# Patient Record
Sex: FEMALE | Race: WHITE | Employment: PART TIME | URBAN - METROPOLITAN AREA
[De-identification: names, ages, dates, MRNs, and addresses within clinical notes are randomized per-mention and may not be internally consistent; named-entity substitution may affect disease eponyms.]

---

## 2020-05-21 ENCOUNTER — VIRTUAL VISIT (OUTPATIENT)
Dept: PRIMARY CARE CLINIC | Age: 45
End: 2020-05-21

## 2020-05-21 DIAGNOSIS — R10.33 UMBILICAL PAIN: Primary | ICD-10-CM

## 2020-05-21 DIAGNOSIS — Z98.890 HX OF UMBILICAL HERNIA REPAIR: ICD-10-CM

## 2020-05-21 DIAGNOSIS — Z87.19 HX OF UMBILICAL HERNIA REPAIR: ICD-10-CM

## 2020-05-21 DIAGNOSIS — Z76.89 ENCOUNTER TO ESTABLISH CARE WITH NEW DOCTOR: ICD-10-CM

## 2020-05-21 DIAGNOSIS — K51.90 CHRONIC ULCERATIVE COLITIS WITHOUT COMPLICATION, UNSPECIFIED LOCATION (HCC): ICD-10-CM

## 2020-05-21 DIAGNOSIS — M62.08 SEPARATION OF ABDOMINAL MUSCLES: ICD-10-CM

## 2020-05-21 PROBLEM — Z91.09 ALLERGY TO ENVIRONMENTAL FACTORS: Status: ACTIVE | Noted: 2020-05-21

## 2020-05-21 PROBLEM — J45.990 ASTHMA, EXERCISE INDUCED: Status: ACTIVE | Noted: 2020-05-21

## 2020-05-21 RX ORDER — MESALAMINE 800 MG/1
TABLET, DELAYED RELEASE ORAL
COMMUNITY
Start: 2015-11-30

## 2020-05-21 RX ORDER — MINERAL OIL
ENEMA (ML) RECTAL
COMMUNITY
Start: 2017-12-12

## 2020-05-21 NOTE — PROGRESS NOTES
Prem Fatima is a 38 y.o. female who was seen by synchronous (real-time) audio-video technology on 5/21/2020. Consent: Prem Fatima, who was seen by synchronous (real-time) audio-video technology, and/or her healthcare decision maker, is aware that this patient-initiated, Telehealth encounter on 5/21/2020 is a billable service, with coverage as determined by her insurance carrier. She is aware that she may receive a bill and has provided verbal consent to proceed: Yes. I was in the office while conducting this encounter. This virtual visit was conducted via [address] Pursuant to the emergency declaration under the [facility], [facility] waiver authority and the [employer] and [employer] Act, this Virtual  Visit was conducted to reduce the patient's risk of exposure to COVID-19 and provide continuity of care for an established patient. Services were provided through a video synchronous discussion virtually to substitute for in-person clinic visit. Due to this being a TeleHealth evaluation, many elements of the physical examination are unable to be assessed. Assessment & Plan:     Diagnoses and all orders for this visit:    1. Umbilical pain  -    Tenderness above the umbilicus. Concern about hernia. Will send her for US ABD COMP; Future        And mange accordingly. 2. Hx of umbilical hernia repair  -     US ABD COMP; Future             Same as #1  3. Separation of abdominal muscles  -     US ABD COMP; Future              Same as #1  4. Encounter to establish care with new doctor    5. Chronic ulcerative colitis without complication, unspecified location ([address]     - stable . Managed by gastro. Follow-up and Dispositions    · Return if symptoms worsen or fail to improve, for complete physical  and fasting blood work. .               Subjective:   Prem Fatima is a 38 y.o. female who was seen for Establish Care and Abdominal Pain (tender over the umbilical area. wondering if she has hernia. )    This is a 39 y/o pleasant F with medical hx is significant for ulcerative colitis  is here as a new patient to establish as well as some concern. She has been following up with gastroenterologist regularly and UC well controlled with Asacol. Patient reports that for the past one week she has been experiencing tenderness above her belly button. She mention that she  has of abdominal muscle separation from her previous pregnancy and hx of umbilical hernia repair. States that when she runs she feels the pain in that area. She does not see any bulging. Denies any N/V, constipation, diarrhea. She worry that she may have hernia and would like to have it checked. Prior to Admission medications    Medication Sig Start Date End Date Taking?  Authorizing Provider   mesalamine DR (Asacol HD) 800 mg DR tablet Asacol  mg tablet,delayed release   Prescribed by non 606/706 Nick Valdez MD 11/30/15  Yes Provider, Historical   fexofenadine (Allegra Allergy) 180 mg tablet Allegra Allergy 180 mg tablet 12/12/17  Yes Provider, Historical     Allergies   Allergen Reactions    [place] Rash    Tomato Rash       Patient Active Problem List   Diagnosis Code    Ulcerative colitis, chronic (HCC) K51.90    Asthma, exercise induced J45.990    Allergy to environmental factors Z91.09     Current Outpatient Medications   Medication Sig Dispense Refill    mesalamine DR (Asacol HD) 800 mg DR tablet Asacol  mg tablet,delayed release   Prescribed by non KORI DIANE      fexofenadine (Allegra Allergy) 180 mg tablet Allegra Allergy 180 mg tablet       Allergies   Allergen Reactions    [place] Rash    Tomato Rash     Past Medical History:   Diagnosis Date    Asthma      Past Surgical History:   Procedure Laterality Date    HX KNEE ARTHROSCOPY Bilateral     Meniscus     Family History   Problem Relation Age of Onset    Hypertension Mother     Elevated Lipids Mother     No Known Problems Father     Elevated Lipids Sister     Hypertension Sister      Social History     Tobacco Use    Smoking status: Never Smoker    Smokeless tobacco: Never Used   Substance Use Topics    Alcohol use: Not on file     Comment: occasionally       Review of Systems   Constitutional: Negative for chills and fever. HENT: Negative for sore throat. Respiratory: Negative for cough and shortness of breath. Cardiovascular: Negative for chest pain and palpitations. Gastrointestinal: Positive for abdominal pain. Negative for blood in stool, constipation, diarrhea, melena, nausea and vomiting. Genitourinary: Negative for dysuria. Musculoskeletal: Negative for joint pain. Neurological: Negative for dizziness and headaches. Objective:   Vital Signs: (As obtained by patient/caregiver at home)  There were no vitals taken for this visit.      [INSTRUCTIONS:  \"[x]\" Indicates a positive item  \"[]\" Indicates a negative item  -- DELETE ALL ITEMS NOT EXAMINED]    Constitutional: [x] Appears well-developed and well-nourished [x] No apparent distress      [] Abnormal -     Mental status: [x] Alert and awake  [x] Oriented to person/place/time [x] Able to follow commands    [] Abnormal -     Eyes:   EOM    [x]  Normal    [] Abnormal -   Sclera  [x]  Normal    [] Abnormal -          Discharge [x]  None visible   [] Abnormal -     HENT: [x] Normocephalic, atraumatic  [] Abnormal -   [x] Mouth/Throat: Mucous membranes are moist    External Ears [x] Normal  [] Abnormal -    Neck: [x] No visualized mass [] Abnormal -     Pulmonary/Chest: [x] Respiratory effort normal   [x] No visualized signs of difficulty breathing or respiratory distress        [] Abnormal -      Musculoskeletal:   [x] Normal gait with no signs of ataxia         [x] Normal range of motion of neck        [] Abnormal -     Neurological:        [x] No Facial Asymmetry (Cranial nerve 7 motor function) (limited exam due to video visit)          [x] No gaze palsy        [] Abnormal -          Skin:        [x] No significant exanthematous lesions or discoloration noted on facial skin         [] Abnormal -            Psychiatric:       [x] Normal Affect [] Abnormal -        [] No Hallucinations    Other pertinent observable physical exam findings:-  Abdomen: No visible hernia noted except scar rahel from old umbilical henria repair. No bulging noted with coughing. We discussed the expected course, resolution and complications of the diagnosis(es) in detail. Medication risks, benefits, costs, interactions, and alternatives were discussed as indicated. I advised her to contact the office if her condition worsens, changes or fails to improve as anticipated. She expressed understanding with the diagnosis(es) and plan. Adilson Perry is a 38 y.o. female who was evaluated by a video visit encounter for concerns as above. Patient identification was verified prior to start of the visit. A caregiver was present when appropriate. Due to this being a TeleHealth encounter (During INLBU-65 public health emergency), evaluation of the following organ systems was limited: Vitals/Constitutional/EENT/Resp/CV/GI//MS/Neuro/Skin/Heme-Lymph-Imm. Pursuant to the emergency declaration under the [facility], [facility] waiver authority and the [employer] Act, this Virtual  Visit was conducted, with patient's (and/or legal guardian's) consent, to reduce the patient's risk of exposure to COVID-19 and provide necessary medical care. Services were provided through a video synchronous discussion virtually to substitute for in-person clinic visit.      Joy Suarez MD

## 2020-05-26 ENCOUNTER — HOSPITAL ENCOUNTER (OUTPATIENT)
Dept: ULTRASOUND IMAGING | Age: 45
Discharge: HOME | End: 2020-05-26
Attending: FAMILY MEDICINE
Payer: OTHER GOVERNMENT

## 2020-05-26 DIAGNOSIS — R10.33 UMBILICAL PAIN: ICD-10-CM

## 2020-05-26 DIAGNOSIS — Z87.19 HX OF UMBILICAL HERNIA REPAIR: ICD-10-CM

## 2020-05-26 DIAGNOSIS — M62.08 SEPARATION OF ABDOMINAL MUSCLES: ICD-10-CM

## 2020-05-26 DIAGNOSIS — Z98.890 HX OF UMBILICAL HERNIA REPAIR: ICD-10-CM

## 2020-05-26 PROCEDURE — 76700 US EXAM ABDOM COMPLETE: CPT

## 2020-05-26 PROCEDURE — 76705 ECHO EXAM OF ABDOMEN: CPT

## 2020-05-26 NOTE — PROGRESS NOTES
Result sent through my chart. Good Afternoon! I have reviewed your ultrasound result and it did not show any hernia or any acute abnormality. Please let me know if you continue to have pain. Thanks.     Dr. Bobby Vanegas

## 2020-08-06 ENCOUNTER — OFFICE VISIT (OUTPATIENT)
Dept: PRIMARY CARE CLINIC | Age: 45
End: 2020-08-06
Payer: OTHER GOVERNMENT

## 2020-08-06 VITALS
OXYGEN SATURATION: 99 % | HEIGHT: 65 IN | TEMPERATURE: 98 F | RESPIRATION RATE: 18 BRPM | BODY MASS INDEX: 23.22 KG/M2 | HEART RATE: 72 BPM | WEIGHT: 139.4 LBS | SYSTOLIC BLOOD PRESSURE: 112 MMHG | DIASTOLIC BLOOD PRESSURE: 70 MMHG

## 2020-08-06 DIAGNOSIS — Z98.890 HX OF UMBILICAL HERNIA REPAIR: ICD-10-CM

## 2020-08-06 DIAGNOSIS — Z87.19 HX OF UMBILICAL HERNIA REPAIR: ICD-10-CM

## 2020-08-06 DIAGNOSIS — M79.18 ABDOMINAL MUSCLE PAIN: Primary | ICD-10-CM

## 2020-08-06 PROCEDURE — 99214 OFFICE O/P EST MOD 30 MIN: CPT | Performed by: FAMILY MEDICINE

## 2020-08-06 NOTE — PROGRESS NOTES
Subjective:     Chief Complaint   Patient presents with    Abdominal Pain     umbilical pain follow up         She  is a 38y.o. year old female with medical hx is significant for ulcerative colitis  is here with some concern. She has been following up with gastroenterologist regularly and UC well controlled with Asacol.     Patient reports that for the past 3 months she has been experiencing tenderness above her belly button. She mention that she  has of abdominal muscle separation from her previous pregnancy and hx of umbilical hernia repair when she was 16 y/o. Discomfort noted at the  end of the day. Hurts at the begining of the run. Does not feel the discomfort all the time, comes randomly. Laying down or getting up does not trigger any discomfort. She does not see any bulging. Denies any N/V, constipation, diarrhea. Did a USG done on 5.26.2020 which came back normal.     US Results (most recent):  Results from [place] encounter on 05/26/20   US ABD LTD    Narrative EXAM:  US ABD LTD    INDICATION: Periumbilical pain. History of umbilical hernia status post hernia  repair at age 16. COMPARISON: None. TECHNIQUE: Periumbilical abdominal ultrasound is performed without and with  Valsalva. FINDINGS: No umbilical or periumbilical hernia is identified. There is no  subcutaneous soft tissue mass or collection. Impression IMPRESSION: No umbilical or periumbilical hernia is identified. No acute  abnormality. Pertinent items are noted in HPI.   Objective:     Vitals:    08/06/20 1550   BP: 112/70   Pulse: 72   Resp: 18   Temp: 98 °F (36.7 °C)   TempSrc: Temporal   SpO2: 99%   Weight: 139 lb 6.4 oz (63.2 kg)   Height: 5' 5\" (1.651 m)       Physical Examination: General appearance - alert, well appearing, and in no distress, oriented to person, place, and time and normal appearing weight  Mental status - alert, oriented to person, place, and time, normal mood, behavior, speech, dress, motor activity, and thought processes  Abdomen - soft,  nondistended, no masses or organomegaly. Mild tenderness noted right above the umbilicus. no rebound tenderness noted. scars from previous umbilical hernia incisions noted. no hernias noted. Allergies   Allergen Reactions    [place] Rash    Tomato Rash      Social History     Socioeconomic History    Marital status: UNKNOWN     Spouse name: Not on file    Number of children: Not on file    Years of education: Not on file    Highest education level: Not on file   Tobacco Use    Smoking status: Never Smoker    Smokeless tobacco: Never Used   Substance and Sexual Activity    Drug use: Never    Sexual activity: Yes     Partners: Male     Birth control/protection: None      Family History   Problem Relation Age of Onset    Hypertension Mother    [facility] Elevated Lipids Mother     No Known Problems Father     Elevated Lipids Sister     Hypertension Sister       Past Surgical History:   Procedure Laterality Date    HX KNEE ARTHROSCOPY Bilateral     Meniscus      Past Medical History:   Diagnosis Date    Asthma       Current Outpatient Medications   Medication Sig Dispense Refill    mesalamine DR (Asacol HD) 800 mg DR tablet Asacol  mg tablet,delayed release   Prescribed by non [facility] MD      fexofenadine (Allegra Allergy) 180 mg tablet Allegra Allergy 180 mg tablet          Assessment/ Plan:   Diagnoses and all orders for this visit:    1. Abdominal muscle pain  -     REFERRAL TO PHYSICAL THERAPY             Recent USG was normal. No signs of hernia. Scar tissue? Rectus abdominis muscle separation/strain? Advised to start physical therapy. If not better then we can consider CT scan. 2. Hx of umbilical hernia repair  -     REFERRAL TO PHYSICAL THERAPY           Medication risks/benefits/costs/interactions/alternatives discussed with patient.   Advised patient to call back or return to office if symptoms worsen/change/persist. If patient cannot reach us or should anything more severe/urgent arise he/she should proceed directly to the nearest emergency department. Discussed expected course/resolution/complications of diagnosis in detail with patient. Patient given a written after visit summary which includes her diagnoses, current medications and vitals. Patient expressed understanding with the diagnosis and plan. Follow-up and Dispositions    · Return if symptoms worsen or fail to improve.

## 2020-08-06 NOTE — PROGRESS NOTES
Chief Complaint   Patient presents with    Abdominal Pain     umbilical pain follow up        1. Have you been to the ER, urgent care clinic since your last visit? Hospitalized since your last visit? No    2. Have you seen or consulted any other health care providers outside of the [address] [place] [address] since your last visit? Include any pap smears or colon screening.  No

## 2020-08-20 ENCOUNTER — HOSPITAL ENCOUNTER (OUTPATIENT)
Dept: PHYSICAL THERAPY | Age: 45
Discharge: HOME | End: 2020-08-20
Payer: OTHER GOVERNMENT

## 2020-08-20 DIAGNOSIS — Z00.00 WELL ADULT ON ROUTINE HEALTH CHECK: Primary | ICD-10-CM

## 2020-08-20 PROCEDURE — 97110 THERAPEUTIC EXERCISES: CPT | Performed by: PHYSICAL THERAPIST

## 2020-08-20 PROCEDURE — 97140 MANUAL THERAPY 1/> REGIONS: CPT | Performed by: PHYSICAL THERAPIST

## 2020-08-20 PROCEDURE — 97161 PT EVAL LOW COMPLEX 20 MIN: CPT | Performed by: PHYSICAL THERAPIST

## 2020-08-20 NOTE — PROGRESS NOTES
[facility] Physical Therapy and Sports Medicine  [address] [facility], [address]  Phone: [phone]  Fax: [phone]    PT INITIAL EVALUATION NOTE - [facility] 2-15    Patient Name: Amelia Martinez  Date:2020  : 1975  [x]  Patient  Verified   Payor:  / Plan: Shad Chowdary 74 / Product Type:  /    In time:115  Out time:215  Total Treatment Time (min): 60  Total Timed Codes (min): 25  1:1 Treatment Time ( only): 25   Visit #: 1     Treatment Area: Myalgia, other site [M79.18]    SUBJECTIVE    Any medication changes, allergies to medications, adverse drug reactions, diagnosis change, or new procedure performed?: [] No    [x] Yes (see summary sheet for update)    Current symptoms/chief complaint:  Abdominal pain, 4 inches superior to umbilicus. Dr. Enrike Larson which was negative, sent to PT, may have CT scan if PT does not help. Umbilical hernia repair at age 16years old. Pain is inconsistent - comes and goes. Pt notes starting about 6 mo ago she did start to have some incontinence with cough/sneeze with her allergies. Date of onset/injury: 2020. 2020 she did start yoga again but stopped when she started to have pain (did yoga for about 2 weeks - was yoga/pilates video), she also doubled her running mileage. Aggravated by: Overeating - from the pressure. Some kinds of yoga moves. Crunch or curl up - she doesn't have pain when she is doing it but has soreness afterwards. Eased by: time. Pain Level (0-10 scale): Current:  0 Least: 0 Worst: 6     Location of symptoms: just above her umbilicus. Quarter sized area of pain. PMH: Significant for Pt reports OB dx her with rectus abdominis separation. Pt reports 2 children (vaginal delivery). Social/Recreation/Work: Pt reports she is a runner. She does planks and does OK tries to avoid other exercises. Pt reports she is mostly doing (prone) planks.     Pt now runs 40-50 miles/week now. Prior level of function: prior to April 2020 did not have pain. Patient goal(s): \"close abdominal muscles\"      Objective:      Posture:   Slight anterior pelvic tilt. Noted old incisions/scars consistent with hx of umbilical hernia repair. Gait:   Description: no sig. Deviations. Strength:     Pt able to perform a plank, able to perform a bridge. Curl up : pt able to perform but reproduced her pain. Functional strength:  Pt able to perform single limb squat either LE but noted knee crepitus and noted hip IR/ADD. Palpation:  TTP 2-4 inches superior to umbilicus at midline of rectus abdominis. Stabilization Tests  Able to perform ASLR without pain. During abdominal curl up: 1 finger width of separation noted    Outcome Measure: Patient presents with a FOTO score of next visit. 15 min Therapeutic Exercise:  [x] See flow sheet : see HEP sheet: TrA contraction in hooklying, sidelying, quadruped. TrA cont. With quadruped arm lifts. Discussed PFM quick flicks and \"elevator\" PFM contractions. Rationale: increase strength and improve coordination to improve the patients ability to exercise/perform core work. 10 min Manual Therapy: trial with STM at midline of rectus abdominis - pt noting decreased TTP with increased duration of STM. (Pt with increased pain after assessment of curl ups - had her perform up to 5-10 curl ups). Rationale: decrease pain and increase tissue extensibility to improve the patients ability to exercise/ perform core work. With   [] TE   [] TA   [] neuro   [] other: Patient Education: [x] Review HEP    [] Progressed/Changed HEP based on:   [] positioning   [] body mechanics   [] transfers   [] heat/ice application    [] other:      Pain Level (0-10 scale) post treatment: no c/o of pain.     Assessment:   [x] See POC  [] Other:  Plan:   [x] See POC  [] Other  [] Discharge due to:     Josh Valdes, PT, [facility]     8/20/2020     3:34 PM   PT License #[id number]

## 2020-08-20 NOTE — PROGRESS NOTES
[facility] Physical Therapy  [address], [address] [place] [address]  Phone: [phone]  Fax: [phone]    Plan of Care/Statement of Necessity for Physical Therapy Services  2-15    Patient name: Kaleigh Rhodes  : 1975  Provider#: [id number]  Referral source: Wendy Aranda MD      Medical/Treatment Diagnosis: Myalgia, other site [M79.18]     Prior Hospitalization: see medical history     Comorbidities: see evaluation. Prior Level of Function: see evaluation. Medications: Verified on Patient Summary List    Start of Care: see evaluation. Onset Date: See evaluation       The Plan of Care and following information is based on the information from the initial evaluation. Assessment/ key information: Pt is a 39 yo female with abdominal pain that is about a quarter size large 4 inches superior to her umbilicus. This began 2020. Pt notes she did double her running mileage at this time as well as start yoga/pilates again. Pt reports more pain with overeating and with abdominal type exercises as well. Pt presents with increased TTP, decreased recruitment of TrA muscle seen with planks and quadruped bird dog. Treatment Plan may include any combination of the following: Therapeutic exercise, Therapeutic activities, Neuromuscular re-education, Physical agent/modality, Gait/balance training and Patient education  Patient / Family readiness to learn indicated by: asking questions, trying to perform skills and interest  Persons(s) to be included in education: patient (P)  Barriers to Learning/Limitations: None  Patient Goal (s): see eval  Patient Self Reported Health Status: good  Rehabilitation Potential: good    Short Term Goals: To be accomplished in 2-3 weeks:   1) Pt independent in HEP from day 1    Long Term Goals: To be accomplished in 4-6 weeks:   1) Pt independent in final HEP.     2) Pt will report she can perform core exercises approved by PT without increased abdominal pain   3) Pt will report abdominal pain at worst is 1/10          Frequency / Duration: Patient to be seen 2 times per week for 4-6 weeks. Patient/ Caregiver education and instruction: self care and exercises    [x]  Plan of care has been reviewed with CLAIR Villatoro, PT DPT, OCS 8/20/2020 1:00 PM    ________________________________________________________________________    I certify that the above Therapy Services are being furnished while the patient is under my care. I agree with the treatment plan and certify that this therapy is necessary.     [de-identified] Signature:____________________  Date:____________Time: _________

## 2020-08-28 DIAGNOSIS — Z00.00 WELL ADULT ON ROUTINE HEALTH CHECK: ICD-10-CM

## 2020-08-28 LAB
ALBUMIN SERPL-MCNC: 4.1 G/DL (ref 3.5–5)
ALBUMIN/GLOB SERPL: 1.2 {RATIO} (ref 1.1–2.2)
ALP SERPL-CCNC: 52 U/L (ref 45–117)
ALT SERPL-CCNC: 21 U/L (ref 12–78)
ANION GAP SERPL CALC-SCNC: 5 MMOL/L (ref 5–15)
AST SERPL-CCNC: 18 U/L (ref 15–37)
BASOPHILS # BLD: 0 K/UL (ref 0–0.1)
BASOPHILS NFR BLD: 1 % (ref 0–1)
BILIRUB SERPL-MCNC: 0.6 MG/DL (ref 0.2–1)
BUN SERPL-MCNC: 11 MG/DL (ref 6–20)
BUN/CREAT SERPL: 14 (ref 12–20)
CALCIUM SERPL-MCNC: 9 MG/DL (ref 8.5–10.1)
CHLORIDE SERPL-SCNC: 105 MMOL/L (ref 97–108)
CHOLEST SERPL-MCNC: 202 MG/DL
CO2 SERPL-SCNC: 27 MMOL/L (ref 21–32)
CREAT SERPL-MCNC: 0.76 MG/DL (ref 0.55–1.02)
DIFFERENTIAL METHOD BLD: NORMAL
EOSINOPHIL # BLD: 0.1 K/UL (ref 0–0.4)
EOSINOPHIL NFR BLD: 2 % (ref 0–7)
ERYTHROCYTE [DISTWIDTH] IN BLOOD BY AUTOMATED COUNT: 12.6 % (ref 11.5–14.5)
EST. AVERAGE GLUCOSE BLD GHB EST-MCNC: 103 MG/DL
GLOBULIN SER CALC-MCNC: 3.4 G/DL (ref 2–4)
GLUCOSE SERPL-MCNC: 83 MG/DL (ref 65–100)
HBA1C MFR BLD: 5.2 % (ref 4–5.6)
HCT VFR BLD AUTO: 39.9 % (ref 35–47)
HDLC SERPL-MCNC: 72 MG/DL
HDLC SERPL: 2.8 {RATIO} (ref 0–5)
HGB BLD-MCNC: 13.1 G/DL (ref 11.5–16)
IMM GRANULOCYTES # BLD AUTO: 0 K/UL (ref 0–0.04)
IMM GRANULOCYTES NFR BLD AUTO: 0 % (ref 0–0.5)
LDLC SERPL CALC-MCNC: 114.4 MG/DL (ref 0–100)
LIPID PROFILE,FLP: ABNORMAL
LYMPHOCYTES # BLD: 1.9 K/UL (ref 0.8–3.5)
LYMPHOCYTES NFR BLD: 32 % (ref 12–49)
MCH RBC QN AUTO: 29.3 PG (ref 26–34)
MCHC RBC AUTO-ENTMCNC: 32.8 G/DL (ref 30–36.5)
MCV RBC AUTO: 89.3 FL (ref 80–99)
MONOCYTES # BLD: 0.4 K/UL (ref 0–1)
MONOCYTES NFR BLD: 6 % (ref 5–13)
NEUTS SEG # BLD: 3.6 K/UL (ref 1.8–8)
NEUTS SEG NFR BLD: 59 % (ref 32–75)
NRBC # BLD: 0 K/UL (ref 0–0.01)
NRBC BLD-RTO: 0 PER 100 WBC
PLATELET # BLD AUTO: 260 K/UL (ref 150–400)
PMV BLD AUTO: 10.8 FL (ref 8.9–12.9)
POTASSIUM SERPL-SCNC: 4.4 MMOL/L (ref 3.5–5.1)
PROT SERPL-MCNC: 7.5 G/DL (ref 6.4–8.2)
RBC # BLD AUTO: 4.47 M/UL (ref 3.8–5.2)
SODIUM SERPL-SCNC: 137 MMOL/L (ref 136–145)
TRIGL SERPL-MCNC: 78 MG/DL (ref ?–150)
VLDLC SERPL CALC-MCNC: 15.6 MG/DL
WBC # BLD AUTO: 6 K/UL (ref 3.6–11)

## 2020-08-29 LAB — TSH SERPL-ACNC: 1.76 UIU/ML (ref 0.45–4.5)

## 2020-08-31 ENCOUNTER — OFFICE VISIT (OUTPATIENT)
Dept: PRIMARY CARE CLINIC | Age: 45
End: 2020-08-31
Payer: OTHER GOVERNMENT

## 2020-08-31 VITALS
HEART RATE: 63 BPM | WEIGHT: 139.2 LBS | HEIGHT: 65 IN | BODY MASS INDEX: 23.19 KG/M2 | RESPIRATION RATE: 16 BRPM | SYSTOLIC BLOOD PRESSURE: 99 MMHG | TEMPERATURE: 97.6 F | DIASTOLIC BLOOD PRESSURE: 65 MMHG | OXYGEN SATURATION: 100 %

## 2020-08-31 DIAGNOSIS — K51.90 CHRONIC ULCERATIVE COLITIS WITHOUT COMPLICATION, UNSPECIFIED LOCATION (HCC): ICD-10-CM

## 2020-08-31 DIAGNOSIS — Z00.00 WELL ADULT ON ROUTINE HEALTH CHECK: Primary | ICD-10-CM

## 2020-08-31 DIAGNOSIS — M79.18 ABDOMINAL MUSCLE PAIN: ICD-10-CM

## 2020-08-31 DIAGNOSIS — E78.5 MILD HYPERLIPIDEMIA: ICD-10-CM

## 2020-08-31 PROCEDURE — 99396 PREV VISIT EST AGE 40-64: CPT | Performed by: FAMILY MEDICINE

## 2020-08-31 RX ORDER — CHOLECALCIFEROL (VITAMIN D3) 50 MCG
CAPSULE ORAL
COMMUNITY

## 2020-08-31 NOTE — PROGRESS NOTES
Subjective:   38 y.o. female for Well Woman Check. Her gyne and breast care is done elsewhere by her Ob-Gyne physician. Lab discussed in office today. Mildly elevated cholesterol otherwise labs looks normal. F/U in one year. UC has been well controlled with current med. Following up with GI regularly. Started physical therapy for abdominal muscle pain. Had one therapy so far. She exercises regularly and eats healthy food. Mammogram up to date. Patient Active Problem List   Diagnosis Code    Ulcerative colitis, chronic (HCC) K51.90    Asthma, exercise induced J45.990    Allergy to environmental factors Z91.09     Current Outpatient Medications   Medication Sig Dispense Refill    B.infantis-B.ani-B.long-B.bifi (Probiotic 4X) 10-15 mg TbEC Take  by mouth.  mesalamine DR (Asacol HD) 800 mg DR tablet Asacol  mg tablet,delayed release   Prescribed by non 606/706 Nick Valdez MD      fexofenadine (Allegra Allergy) 180 mg tablet Allegra Allergy 180 mg tablet       Allergies   Allergen Reactions    [place] Rash    Neosporin [Neomycin-Bacitracnzn-Polymyxnb] Hives    Tomato Rash     Past Medical History:   Diagnosis Date    Asthma         Lab Results   Component Value Date/Time    WBC 6.0 08/28/2020 08:54 AM    HGB 13.1 08/28/2020 08:54 AM    HCT 39.9 08/28/2020 08:54 AM    PLATELET 963 55/61/6194 08:54 AM    MCV 89.3 08/28/2020 08:54 AM     Lab Results   Component Value Date/Time    Cholesterol, total 202 (H) 08/28/2020 08:54 AM    HDL Cholesterol 72 08/28/2020 08:54 AM    LDL, calculated 114.4 (H) 08/28/2020 08:54 AM    Triglyceride 78 08/28/2020 08:54 AM    CHOL/HDL Ratio 2.8 08/28/2020 08:54 AM     Lab Results   Component Value Date/Time    ALT (SGPT) 21 08/28/2020 08:54 AM    Alk.  phosphatase 52 08/28/2020 08:54 AM    Bilirubin, total 0.6 08/28/2020 08:54 AM    Albumin 4.1 08/28/2020 08:54 AM    Protein, total 7.5 08/28/2020 08:54 AM    PLATELET 064 66/82/2774 08:54 AM     Lab Results   Component Value Date/Time    GFR est non-AA >60 08/28/2020 08:54 AM    GFR est AA >60 08/28/2020 08:54 AM    Creatinine 0.76 08/28/2020 08:54 AM    BUN 11 08/28/2020 08:54 AM    Sodium 137 08/28/2020 08:54 AM    Potassium 4.4 08/28/2020 08:54 AM    Chloride 105 08/28/2020 08:54 AM    CO2 27 08/28/2020 08:54 AM     Lab Results   Component Value Date/Time    TSH 1.760 08/28/2020 08:54 AM      Lab Results   Component Value Date/Time    Hemoglobin A1c 5.2 08/28/2020 08:54 AM         ROS: Feeling generally well. No TIA's or unusual headaches, no dysphagia. No prolonged cough. No dyspnea or chest pain on exertion. No abdominal pain, change in bowel habits, black or bloody stools. No urinary tract symptoms. No new or unusual musculoskeletal symptoms. Specific concerns today: none. Objective: The patient appears well, alert, oriented x 3, in no distress. Visit Vitals  BP 99/65 (BP 1 Location: Left arm, BP Patient Position: Sitting)   Pulse 63   Temp 97.6 °F (36.4 °C) (Temporal)   Resp 16   Ht 5' 5\" (1.651 m)   Wt 139 lb 3.2 oz (63.1 kg)   LMP 08/30/2020 (Exact Date)   SpO2 100%   BMI 23.16 kg/m²     ENT normal.  Neck supple. No adenopathy or thyromegaly. FAUSTO. Lungs are clear, good air entry, no wheezes, rhonchi or rales. S1 and S2 normal, no murmurs, regular rate and rhythm. Abdomen soft without tenderness, guarding, mass or organomegaly. Extremities show no edema, normal peripheral pulses. Neurological is normal, no focal findings. Breast and Pelvic exams are deferred. Assessment/Plan:   Well Woman  follow low fat diet, follow low salt diet, continue present plan, routine labs ordered, call if any problems    ICD-10-CM ICD-9-CM    1. Well adult on routine health check  Z00.00 V70.0    2. Mild hyperlipidemia  E78.5 272.4    3. Abdominal muscle pain  M79.18 729.1    4. Chronic ulcerative colitis without complication, unspecified [facility]  K51.90 556.9      Diagnoses and all orders for this visit:    1.  Well adult on routine health check      Maintain healthy lifestyle. 2. Mild hyperlipidemia      Counseled on diet and exercise. 3. Abdominal muscle pain      Continue physical therapy. 4. Chronic ulcerative colitis without complication, unspecified location [facility])       Well controlled. Per GI. Follow-up and Dispositions    · Return in about 1 year (around 8/31/2021), or if symptoms worsen or fail to improve, for complete physical  and fasting blood work. .       Lab discussed in office today. Mildly elevated cholesterol otherwise labs looks normal. F/U in one year. current treatment plan is effective, no change in therapy  reviewed diet, exercise and weight control.   Regular breast exam.

## 2020-08-31 NOTE — PROGRESS NOTES
Lab discussed in office today. Mildly elevated cholesterol otherwise labs looks normal. F/U in one year.

## 2020-08-31 NOTE — PROGRESS NOTES
Yarely Bowers is a 38 y.o. female    Chief Complaint   Patient presents with    Complete Physical     labs done prior       1. Have you been to the ER, urgent care clinic since your last visit? Hospitalized since your last visit? No    2. Have you seen or consulted any other health care providers outside of the [facility] since your last visit? Include any pap smears or colon screening. No    No flowsheet data found.      Health Maintenance Due   Topic Date Due    Pneumococcal 0-64 years (1 of 1 - PPSV23) 10/29/1982    DTaP/Tdap/Td series (1 - Tdap) 10/29/1997    PAP AKA CERVICAL CYTOLOGY  10/29/1997    Lipid Screen  10/29/2016

## 2020-09-03 ENCOUNTER — HOSPITAL ENCOUNTER (OUTPATIENT)
Dept: PHYSICAL THERAPY | Age: 45
Discharge: HOME | End: 2020-09-03
Payer: OTHER GOVERNMENT

## 2020-09-03 PROCEDURE — 97140 MANUAL THERAPY 1/> REGIONS: CPT | Performed by: PHYSICAL THERAPIST

## 2020-09-03 PROCEDURE — 97110 THERAPEUTIC EXERCISES: CPT | Performed by: PHYSICAL THERAPIST

## 2020-09-03 NOTE — PROGRESS NOTES
PT DAILY TREATMENT NOTE 2-15    Patient Name: Amelia Martinez  Date:9/3/2020  : 1975  [x]  Patient  Verified  Payor:  / Plan: Ridge Garter / Product Type:  /    In time:250  Out time:3:50  Total Treatment Time (min): 55  Total Timed Codes (min): 55  1:1 Treatment Time ( [address] only): 55   Visit #: 2     Treatment Area: Myalgia, other site [M79.18]    SUBJECTIVE  Pain Level (0-10 scale), subjective functional status/changes: Pt reports 0/10 pain. Discomfort in abdomen feels about the same. She did do a hike and a run afterwards and she could feel some of the pain in her abdomen after that. Any medication changes, allergies to medications, adverse drug reactions, diagnosis change, or new procedure performed?: [x] No    [] Yes (see summary sheet for update) SEE ABOVE. OBJECTIVE     Palpation:  TTP midline of rectus abdominis 2-4 inches superior to umbilicus.      - min Modality:      []  Ice     []  Heat       Position/location:   -   Rationale: decrease pain to improve the patients ability to do functional activities   [x] Skin assessment post-treatment:  [x]intact []redness- no adverse reaction    []redness  adverse reaction:      45 min Therapeutic Exercise:  [x] See flow sheet : See HEP and flow sheet   Rationale: increase strength, improve coordination and increase proprioception to improve the patients ability to run, hike, exercise    10 min Manual Therapy:  STM to rectus abdominis 2-4 inches superior to umbilicus   Rationale: decrease pain, increase tissue extensibility and decrease trigger points  to improve the patients ability to see above          With   [] TE   [] TA   [] neuro   [] other: Patient Education: [x] Review HEP    [] Progressed/Changed HEP based on:   [] positioning   [] body mechanics   [] transfers   [] heat/ice application    [] other:        Pain Level (0-10 scale) post treatment: 0    ASSESSMENT/Changes in Function:   Pt was challenged with keeping neutral spine / stability with quadruped multifidus and TrA muscle exercises. Patient will continue to benefit from skilled PT services to modify and progress therapeutic interventions, analyze and address soft tissue restrictions, analyze and modify body mechanics/ergonomics, assess and modify postural abnormalities and instruct in home and community integration to attain remaining goals. Progress towards goals / Updated goals:  Goals were not re-assessed today.      PLAN      [x]  Continue plan of care    []  Other:_      Mark Garland PT DPT, [facility] 9/3/2020  1:71 PM       PT License #[id number]

## 2020-09-08 ENCOUNTER — APPOINTMENT (OUTPATIENT)
Dept: PHYSICAL THERAPY | Age: 45
End: 2020-09-08
Payer: OTHER GOVERNMENT

## 2020-09-10 ENCOUNTER — HOSPITAL ENCOUNTER (OUTPATIENT)
Dept: PHYSICAL THERAPY | Age: 45
Discharge: HOME | End: 2020-09-10
Payer: OTHER GOVERNMENT

## 2020-09-10 PROCEDURE — 97140 MANUAL THERAPY 1/> REGIONS: CPT | Performed by: PHYSICAL THERAPIST

## 2020-09-10 PROCEDURE — 97110 THERAPEUTIC EXERCISES: CPT | Performed by: PHYSICAL THERAPIST

## 2020-09-10 NOTE — PROGRESS NOTES
PT DAILY TREATMENT NOTE 2-15    Patient Name: Yolie García  Date:9/10/2020  : 1975  [x]  Patient  Verified  Payor:  / Plan: Shad Chowdary 74 / Product Type:  /    In time:250  Out time:3:50  Total Treatment Time (min): 55  Total Timed Codes (min): 55  1:1 Treatment Time ( [address] only): 55   Visit #: 3    Treatment Area: Myalgia, other site [M79.18]    SUBJECTIVE  Pain Level (0-10 scale), subjective functional status/changes: Pt reports 0/10 pain. Pt reports she is able to initiate engaging TrA muscle but is hard to keep it in while running, \"I don't have the endurance. \"  Pt notes she has had back/hip issues in the past especially when her son was 11 mo old. Any medication changes, allergies to medications, adverse drug reactions, diagnosis change, or new procedure performed?: [x] No    [] Yes (see summary sheet for update) SEE ABOVE. OBJECTIVE     Palpation:  TTP midline of rectus abdominis 2-4 inches superior to umbilicus. Strength:  glute med (hip abd) 4-/5 B/L.      - min Modality:      []  Ice     []  Heat       Position/location:   -   Rationale: decrease pain to improve the patients ability to do functional activities   [x] Skin assessment post-treatment:  [x]intact []redness- no adverse reaction    []redness  adverse reaction:      45 min Therapeutic Exercise:  [x] See flow sheet : added quentin, ludmila, stork exercise. Cues for TrA contraction prior to performing exercise. Trialed with kavin abreu but pt with inc. Back pain.      Rationale: increase strength, improve coordination and increase proprioception to improve the patients ability to run, hike, exercise    10 min Manual Therapy:  STM to rectus abdominis 2-4 inches superior to umbilicus   Rationale: decrease pain, increase tissue extensibility and decrease trigger points  to improve the patients ability to see above          With   [] TE   [] TA   [] neuro   [] other: Patient Education: [x] Review HEP    [] Progressed/Changed HEP based on:   [] positioning   [] body mechanics   [] transfers   [] heat/ice application    [] other:        Pain Level (0-10 scale) post treatment: 0    ASSESSMENT/Changes in Function:   Pt showing glute inhibition with difficulty with quadruped hip extension osbaldo. On the left as well as showing weakness with glute. Med. Testing B/L (left was slightly weaker than the right). Added to ther. Ex. And HEP to address this. Patient will continue to benefit from skilled PT services to modify and progress therapeutic interventions, analyze and address soft tissue restrictions, analyze and modify body mechanics/ergonomics, assess and modify postural abnormalities and instruct in home and community integration to attain remaining goals.               Short Term Goals: To be accomplished in 2-3 weeks:              1) Pt independent in HEP from day 1 MET      Long Term Goals: To be accomplished in 4-6 weeks:              1) Pt independent in final HEP.                2) Pt will report she can perform core exercises approved by PT without increased abdominal pain              3) Pt will report abdominal pain at worst is 1/10                     PLAN      [x]  Continue plan of care    []  Other:_      Denita Hidalgo, PT DPT, [facility] 9/10/2020  4:92 PM       PT License #[id number]

## 2020-09-15 ENCOUNTER — HOSPITAL ENCOUNTER (OUTPATIENT)
Dept: PHYSICAL THERAPY | Age: 45
Discharge: HOME | End: 2020-09-15
Payer: OTHER GOVERNMENT

## 2020-09-15 PROCEDURE — 97140 MANUAL THERAPY 1/> REGIONS: CPT | Performed by: PHYSICAL THERAPIST

## 2020-09-15 PROCEDURE — 97110 THERAPEUTIC EXERCISES: CPT | Performed by: PHYSICAL THERAPIST

## 2020-09-15 NOTE — PROGRESS NOTES
PT DAILY TREATMENT NOTE 2-15    Patient Name: Stefany Adame  Date:9/15/2020  : 1975  [x]  Patient  Verified  Payor: STEVE / Plan: Shad Chowdary 74 / Product Type:  /    In time:253  Out time:3:53  Total Treatment Time (min): 55  Total Timed Codes (min): 55  1:1 Treatment Time ([address] only): 55   Visit #: 4    Treatment Area: Myalgia, other site [M79.18]    SUBJECTIVE  Pain Level (0-10 scale), subjective functional status/changes: Pt reports 0/10 pain. Pt reports that she is feeling stronger. Has not had any abdominal pain since last visit. Any medication changes, allergies to medications, adverse drug reactions, diagnosis change, or new procedure performed?: [x] No    [] Yes (see summary sheet for update) SEE ABOVE. OBJECTIVE     Palpation:  TTP midline of rectus abdominis 2-4 inches superior to umbilicus. - min Modality:      []  Ice     []  Heat       Position/location:   -   Rationale: decrease pain to improve the patients ability to do functional activities   [x] Skin assessment post-treatment:  [x]intact []redness- no adverse reaction    []redness  adverse reaction:      45 min Therapeutic Exercise:  [x] See flow sheet : Added standing hip extension stab. Kicks 2x/10 red tb, march : up , up , down, down, side steps with red t-band. Reviewed/discussed PFM elevator exercise and quick flicks.     Rationale: increase strength, improve coordination and increase proprioception to improve the patients ability to run, hike, exercise    10 min Manual Therapy:  STM to rectus abdominis 2-4 inches superior to umbilicus   Rationale: decrease pain, increase tissue extensibility and decrease trigger points  to improve the patients ability to see above          With   [] TE   [] TA   [] neuro   [] other: Patient Education: [x] Review HEP    [] Progressed/Changed HEP based on:   [] positioning   [] body mechanics   [] transfers   [] heat/ice application    [] other: Pain Level (0-10 scale) post treatment: 0    ASSESSMENT/Changes in Function:   Pt looking much more stable with quadruped bird dog with less rotation observed. Pt noting she feels \"stronger\" and less difficulty to engage TrA muscle. Patient will continue to benefit from skilled PT services to modify and progress therapeutic interventions, analyze and address soft tissue restrictions, analyze and modify body mechanics/ergonomics, assess and modify postural abnormalities and instruct in home and community integration to attain remaining goals.               Short Term Goals: To be accomplished in 2-3 weeks:              1) Pt independent in HEP from day 1 MET      Long Term Goals: To be accomplished in 4-6 weeks:              1) Pt independent in final HEP.                2) Pt will report she can perform core exercises approved by PT without increased abdominal pain              3) Pt will report abdominal pain at worst is 1/10                     PLAN      [x]  Continue plan of care    []  Other:_      Anant Ross, PT DPT, [facility] 9/15/2020  7:56 PM       PT License #[id number]

## 2020-09-17 ENCOUNTER — HOSPITAL ENCOUNTER (OUTPATIENT)
Dept: PHYSICAL THERAPY | Age: 45
Discharge: HOME | End: 2020-09-17
Payer: OTHER GOVERNMENT

## 2020-09-17 PROCEDURE — 97110 THERAPEUTIC EXERCISES: CPT | Performed by: PHYSICAL THERAPIST

## 2020-09-17 PROCEDURE — 97140 MANUAL THERAPY 1/> REGIONS: CPT | Performed by: PHYSICAL THERAPIST

## 2020-09-17 NOTE — PROGRESS NOTES
PT DAILY TREATMENT NOTE 2-15    Patient Name: Miranda Ny  Date:2020  : 1975  [x]  Patient  Verified  Payor:  / Plan: Shad Chowdary 74 / Product Type:  /    In time:255  Out time:3:55  Total Treatment Time (min): 55  Total Timed Codes (min): 55  1:1 Treatment Time ( [address] only): 55   Visit #: 5    Treatment Area: Myalgia, other site [M79.18]    SUBJECTIVE  Pain Level (0-10 scale), subjective functional status/changes: Pt reports 0/10 pain. Pt reports she has not had the abdominal pain since before last visit. She has been having some left hamstring pain. Doesn't feel it during a run or exercise but later on. Not sure what is causing this. Any medication changes, allergies to medications, adverse drug reactions, diagnosis change, or new procedure performed?: [x] No    [] Yes (see summary sheet for update) SEE ABOVE. OBJECTIVE     Palpation:  TTP midline of rectus abdominis 2-4 inches superior to umbilicus. - min Modality:      []  Ice     []  Heat       Position/location:   -   Rationale: decrease pain to improve the patients ability to do functional activities   [x] Skin assessment post-treatment:  [x]intact []redness- no adverse reaction    []redness  adverse reaction:      45 min Therapeutic Exercise:  [x] See flow sheet : added SLS runner's drill, prone hip ext. With bent knee and pillow, standing hip ABD. Advised that we hold on bridge for now to see if this is causing hamstring pain.      Rationale: increase strength, improve coordination and increase proprioception to improve the patients ability to run, hike, exercise    10 min Manual Therapy:  STM to rectus abdominis 2-4 inches superior to umbilicus   Rationale: decrease pain, increase tissue extensibility and decrease trigger points  to improve the patients ability to see above          With   [] TE   [] TA   [] neuro   [] other: Patient Education: [x] Review HEP    [] Progressed/Changed HEP based on:   [] positioning   [] body mechanics   [] transfers   [] heat/ice application    [] other:        Pain Level (0-10 scale) post treatment: 0    ASSESSMENT/Changes in Function:   Pt with increased stability in quadruped exercises. She has not experienced abdominal pain recently but has been experiencing some hamstring pain (left) past week. We are holding on the bridges for now to see if this is the cause. Patient will continue to benefit from skilled PT services to modify and progress therapeutic interventions, analyze and address soft tissue restrictions, analyze and modify body mechanics/ergonomics, assess and modify postural abnormalities and instruct in home and community integration to attain remaining goals.               Short Term Goals: To be accomplished in 2-3 weeks:              1) Pt independent in HEP from day 1 MET      Long Term Goals: To be accomplished in 4-6 weeks:              1) Pt independent in final HEP.                2) Pt will report she can perform core exercises approved by PT without increased abdominal pain              3) Pt will report abdominal pain at worst is 1/10                     PLAN      [x]  Continue plan of care    []  Other:_      Darshana Maya, PT DPT, [facility] 9/17/2020  4:93 PM       PT License #[id number]

## 2020-09-22 ENCOUNTER — HOSPITAL ENCOUNTER (OUTPATIENT)
Dept: PHYSICAL THERAPY | Age: 45
Discharge: HOME | End: 2020-09-22
Payer: OTHER GOVERNMENT

## 2020-09-22 PROCEDURE — 97140 MANUAL THERAPY 1/> REGIONS: CPT | Performed by: PHYSICAL THERAPIST

## 2020-09-22 PROCEDURE — 97110 THERAPEUTIC EXERCISES: CPT | Performed by: PHYSICAL THERAPIST

## 2020-09-22 NOTE — PROGRESS NOTES
PT Re-assessment / DAILY TREATMENT NOTE 2-15    Patient Name: Miranda Ny  Date:2020  : 1975  [x]  Patient  Verified  Payor:  / Plan: Shad Chowdary 74 / Product Type:  /    In time:255  Out time:3:55  Total Treatment Time (min): 55  Total Timed Codes (min): 55  1:1 Treatment Time ( [address] only): 55   Visit #: 6    Treatment Area: Myalgia, other site [M79.18]    SUBJECTIVE  Pain Level (0-10 scale), subjective functional status/changes: Pt reports 0/10 pain. Pt reports she has not had the abdominal pain since before last visit. She has been having some left hamstring pain. This has been better, more like a 1/10 versus the 4/10 she was having but it is still there. Any medication changes, allergies to medications, adverse drug reactions, diagnosis change, or new procedure performed?: [x] No    [] Yes (see summary sheet for update) SEE ABOVE. OBJECTIVE     Palpation:  TTP midline of rectus abdominis 2-4 inches superior to umbilicus but less severe as compared to past visits. Coordination / core stability: bird dog: pt now able to perform without toe slide with good neutral spine. Prone hip ext.: pt tending to extend lumbar spine / use lumbar paraspinals. Single limb squat: pt able to perform without hip ADD/IR but difficulty with keeping stable in position with squat, notes fatigue with inc. Reps. - min Modality:      []  Ice     []  Heat       Position/location:   -   Rationale: decrease pain to improve the patients ability to do functional activities   [x] Skin assessment post-treatment:  [x]intact []redness- no adverse reaction    []redness  adverse reaction:      45 min Therapeutic Exercise:  [x] See flow sheet : added seated on swiss ball march, then with alt. Arms. Marching on foam roller.      Rationale: increase strength, improve coordination and increase proprioception to improve the patients ability to run, hike, exercise    10 min Manual Therapy:  STM to rectus abdominis 2-4 inches superior to umbilicus   Rationale: decrease pain, increase tissue extensibility and decrease trigger points  to improve the patients ability to see above          With   [] TE   [] TA   [] neuro   [] other: Patient Education: [x] Review HEP    [] Progressed/Changed HEP based on:   [] positioning   [] body mechanics   [] transfers   [] heat/ice application    [] other:        Pain Level (0-10 scale) post treatment: 0    ASSESSMENT/Changes in Function:   Pt with improved core stability noted in hooklying, quadruped and sidelying exercises. She has not had any report of abdominal pain in last few visits. She has been doing well in progression of core stability work but still does need cues at times to avoid compensations with her low back or hamstrings. Patient will continue to benefit from skilled PT services to modify and progress therapeutic interventions, analyze and address soft tissue restrictions, analyze and modify body mechanics/ergonomics, assess and modify postural abnormalities and instruct in home and community integration to attain remaining goals.               Short Term Goals: To be accomplished in 2-3 weeks:              1) Pt independent in HEP from day 1 MET      Long Term Goals: To be accomplished in 4-6 weeks:              1) Pt independent in final HEP.                2) Pt will report she can perform core exercises approved by PT without increased abdominal pain              3) Pt will report abdominal pain at worst is 1/10                     PLAN      [x]  Continue plan of care    [x]  Other:_ cont PT 1-2x/week for 1-2 weeks     Lizbeth Heaton, PT DPLETI, [facility] 9/22/2020  6:70 PM       PT License #[id number]

## 2020-09-24 ENCOUNTER — HOSPITAL ENCOUNTER (OUTPATIENT)
Dept: PHYSICAL THERAPY | Age: 45
Discharge: HOME | End: 2020-09-24
Payer: OTHER GOVERNMENT

## 2020-09-24 PROCEDURE — 97110 THERAPEUTIC EXERCISES: CPT | Performed by: PHYSICAL THERAPIST

## 2020-09-24 PROCEDURE — 97140 MANUAL THERAPY 1/> REGIONS: CPT | Performed by: PHYSICAL THERAPIST

## 2020-09-24 NOTE — PROGRESS NOTES
PT DAILY TREATMENT NOTE 2-15    Patient Name: Leonel Piedra  Date:2020  : 1975  [x]  Patient  Verified  Payor:  / Plan: Shad Chowdary 74 / Product Type:  /    In time:255  Out time:3:55   Total Treatment Time (min): 55  Total Timed Codes (min): 55  1:1 Treatment Time ( [address] only): 55   Visit #: 8    Treatment Area: Myalgia, other site [M79.18]    SUBJECTIVE  Pain Level (0-10 scale), subjective functional status/changes: Pt reports 0/10 pain. Pt reports no longer having hamstring pain just discomfort. Any medication changes, allergies to medications, adverse drug reactions, diagnosis change, or new procedure performed?: [x] No    [] Yes (see summary sheet for update) SEE ABOVE. OBJECTIVE         - min Modality:      []  Ice     []  Heat       Position/location:   -   Rationale: decrease pain to improve the patients ability to do functional activities   [x] Skin assessment post-treatment:  [x]intact []redness- no adverse reaction    []redness  adverse reaction:      45 min Therapeutic Exercise:  [x] See flow sheet : added \"stir the pot\" and paloff press\"    Rationale: increase strength, improve coordination and increase proprioception to improve the patients ability to run, hike, exercise    10 min Manual Therapy:  STM to rectus abdominis 2-4 inches superior to umbilicus   Rationale: decrease pain, increase tissue extensibility and decrease trigger points  to improve the patients ability to see above          With   [] TE   [] TA   [] neuro   [] other: Patient Education: [x] Review HEP    [] Progressed/Changed HEP based on:   [] positioning   [] body mechanics   [] transfers   [] heat/ice application    [] other:        Pain Level (0-10 scale) post treatment: 0    ASSESSMENT/Changes in Function:   Pt did well with additional exercises - progressed per flow sheet. No symptoms/pain in abdominal region.    Patient will continue to benefit from skilled PT services to modify and progress therapeutic interventions, analyze and address soft tissue restrictions, analyze and modify body mechanics/ergonomics, assess and modify postural abnormalities and instruct in home and community integration to attain remaining goals.               Short Term Goals: To be accomplished in 2-3 weeks:              1) Pt independent in HEP from day 1 MET      Long Term Goals: To be accomplished in 4-6 weeks:              1) Pt independent in final HEP.                2) Pt will report she can perform core exercises approved by PT without increased abdominal pain              3) Pt will report abdominal pain at worst is 1/10                     PLAN      [x]  Continue plan of care    [x]  Other:_ cont PT 1-2x/week for 1-2 weeks     Kierra Loera, PT DPT, [facility] 9/24/2020  2:46 PM       PT License #[id number]

## 2020-09-29 ENCOUNTER — HOSPITAL ENCOUNTER (OUTPATIENT)
Dept: PHYSICAL THERAPY | Age: 45
Discharge: HOME | End: 2020-09-29
Payer: OTHER GOVERNMENT

## 2020-09-29 PROCEDURE — 97140 MANUAL THERAPY 1/> REGIONS: CPT | Performed by: PHYSICAL THERAPIST

## 2020-09-29 PROCEDURE — 97110 THERAPEUTIC EXERCISES: CPT | Performed by: PHYSICAL THERAPIST

## 2020-09-29 NOTE — PROGRESS NOTES
PT DAILY TREATMENT NOTE 2-15    Patient Name: Luz Singleton  Date:2020  : 1975  [x]  Patient  Verified  Payor: STEVE / Plan: Shad Chowdary [address] / Product Type:  /    In time:300   Out time: 400    Total Treatment Time (min): 55  Total Timed Codes (min): 55  1:1 Treatment Time ( only): 55   Visit #: 8    Treatment Area: Myalgia, other site [M79.18]    SUBJECTIVE  Pain Level (0-10 scale), subjective functional status/changes: Pt reports 0/10 pain. Pt reports she did have an episode of pain in her abdomen - it was after some 1/4 sprints - it was more of an awareness of discomfort / the muscle jessica versus pain she has had in the past.  The discomfort lasted about 5 minutes and in the past it had lasted more like for a day / day and a half. Any medication changes, allergies to medications, adverse drug reactions, diagnosis change, or new procedure performed?: [x] No    [] Yes (see summary sheet for update) SEE ABOVE. OBJECTIVE     Palpation:  TTP 2-4 inches superior to umbilicus. RIGHT lateral proximal hamstring (1-2 inches distal to ischial tuberosity)     - min Modality:      []  Ice     []  Heat       Position/location:   -   Rationale: decrease pain to improve the patients ability to do functional activities   [x] Skin assessment post-treatment:  [x]intact []redness- no adverse reaction    []redness  adverse reaction:      45 min Therapeutic Exercise:  [x] See flow sheet : added hip hinge with dowel. Rationale: increase strength, improve coordination and increase proprioception to improve the patients ability to run, hike, exercise    10 min Manual Therapy:  STM to rectus abdominis 2-4 inches superior to umbilicus. STM to proximal right hamstring muscles.      Rationale: decrease pain, increase tissue extensibility and decrease trigger points  to improve the patients ability to see above          With   [] TE   [] TA   [] neuro   [] other: Patient Education: [x] Review HEP    [] Progressed/Changed HEP based on:   [] positioning   [] body mechanics   [] transfers   [] heat/ice application    [] other:        Pain Level (0-10 scale) post treatment: 0    ASSESSMENT/Changes in Function:   Pt noting following STM to hamstring she was able to recruit glutes more easily with prone hip extension exercise. Pt with abdominal symptoms after running speed work but symptoms were 5-10 min versus 1-2 days and \"discomfort\" versus pain per pt report. Patient will continue to benefit from skilled PT services to modify and progress therapeutic interventions, analyze and address soft tissue restrictions, analyze and modify body mechanics/ergonomics, assess and modify postural abnormalities and instruct in home and community integration to attain remaining goals.               Short Term Goals: To be accomplished in 2-3 weeks:              1) Pt independent in HEP from day 1 MET      Long Term Goals: To be accomplished in 4-6 weeks:              1) Pt independent in final HEP.                2) Pt will report she can perform core exercises approved by PT without increased abdominal pain              3) Pt will report abdominal pain at worst is 1/10                     PLAN      [x]  Continue plan of care    [x]  Other:_ cont PT 1-2x/week for 1-2 weeks     Anabel Martinez, PT DPT, [facility] 9/29/2020  2:14 PM       PT License #[id number]

## 2020-10-01 ENCOUNTER — HOSPITAL ENCOUNTER (OUTPATIENT)
Dept: PHYSICAL THERAPY | Age: 45
Discharge: HOME | End: 2020-10-01
Payer: OTHER GOVERNMENT

## 2020-10-01 PROCEDURE — 97140 MANUAL THERAPY 1/> REGIONS: CPT | Performed by: PHYSICAL THERAPIST

## 2020-10-01 PROCEDURE — 97110 THERAPEUTIC EXERCISES: CPT | Performed by: PHYSICAL THERAPIST

## 2020-10-01 NOTE — PROGRESS NOTES
PT DAILY TREATMENT NOTE 2-15    Patient Name: Mercedes Katz  Date:10/1/2020  : 1975  [x]  Patient  Verified  Payor:  / Plan: Shad Chowdary 75 / Product Type:  /    In time:300   Out time: 400    Total Treatment Time (min): 55  Total Timed Codes (min): 55  1:1 Treatment Time ( only): 55   Visit #: 9    Treatment Area: Myalgia, other site [M79.18]    SUBJECTIVE  Pain Level (0-10 scale), subjective functional status/changes: Pt reports 0/10 pain. Pt reports no abdominal pain since last episode. She did have some (right) hamstring tightness after her 5 mile run. Any medication changes, allergies to medications, adverse drug reactions, diagnosis change, or new procedure performed?: [x] No    [] Yes (see summary sheet for update) SEE ABOVE. OBJECTIVE     Palpation:  TTP 2-4 inches superior to umbilicus. RIGHT lateral proximal hamstring (1-2 inches distal to ischial tuberosity)     Strength:  Left 5/5, right 4+/5 (glute med). Prone hip ext: 5/5 bilaterally but still tending to active ham first (R>L). - min Modality:      []  Ice     []  Heat       Position/location:   -   Rationale: decrease pain to improve the patients ability to do functional activities   [x] Skin assessment post-treatment:  [x]intact []redness- no adverse reaction    []redness - adverse reaction:      45 min Therapeutic Exercise:  [x] See flow sheet : added prone isometric glutes and prone heel squeeze. Rationale: increase strength, improve coordination and increase proprioception to improve the patients ability to run, hike, exercise    10 min Manual Therapy:  STM to rectus abdominis 2-4 inches superior to umbilicus. STM to proximal right hamstring muscles.      Rationale: decrease pain, increase tissue extensibility and decrease trigger points  to improve the patients ability to see above          With   [] TE   [] TA   [] neuro   [] other: Patient Education: [x] Review HEP    [] Progressed/Changed HEP based on:   [] positioning   [] body mechanics   [] transfers   [] heat/ice application    [] other:        Pain Level (0-10 scale) post treatment: 0    ASSESSMENT/Changes in Function:   Pt with 9 skilled PT sessions. She has shown improved core stability and hip strength. She shows decreased severity of TTP over rectus abdominis muscle but still with TTP. She has only had 1 episode of abdominal pain in last 1-2 weeks and pain lasted 5-10 min and was <1/10 after a speed work out (running). Pt now to try HEP on her own and will schedule only as needed. Patient will continue to benefit from skilled PT services to modify and progress therapeutic interventions, analyze and address soft tissue restrictions, analyze and modify body mechanics/ergonomics, assess and modify postural abnormalities and instruct in home and community integration to attain remaining goals.               Short Term Goals: To be accomplished in 2-3 weeks:              1) Pt independent in HEP from day 1 MET      Long Term Goals: To be accomplished in 4-6 weeks:              1) Pt independent in final HEP. 2) Pt will report she can perform core exercises approved by PT without increased abdominal pain MET               3) Pt will report abdominal pain at worst is 1/10 MET                      PLAN      [x]  Continue plan of care    [x]  Other:_ pt to continue on her own and schedule only as needed next 1-2 weeks.       Iliana Lin, PT DPT, OCS 10/1/2020  2:04 PM       PT License #[id number]

## 2020-10-06 ENCOUNTER — VIRTUAL VISIT (OUTPATIENT)
Dept: PRIMARY CARE CLINIC | Age: 45
End: 2020-10-06
Payer: OTHER GOVERNMENT

## 2020-10-06 DIAGNOSIS — J01.01 ACUTE RECURRENT MAXILLARY SINUSITIS: Primary | ICD-10-CM

## 2020-10-06 PROCEDURE — 99213 OFFICE O/P EST LOW 20 MIN: CPT | Performed by: NURSE PRACTITIONER

## 2020-10-06 RX ORDER — AMOXICILLIN AND CLAVULANATE POTASSIUM 875; 125 MG/1; MG/1
1 TABLET, FILM COATED ORAL 2 TIMES DAILY
Qty: 20 TAB | Refills: 0 | Status: SHIPPED | OUTPATIENT
Start: 2020-10-06 | End: 2020-10-16

## 2020-10-06 NOTE — PROGRESS NOTES
[facility]   [address], [address] [facility]  P: [phone]  F: [phone]      Chief Complaint   Patient presents with    Sinus Infection       DELVIS Dwyer is a 38 y.o. female who presents over telehealth for Sinus Infection (stated concern) after experiencing sinus pressure for the last week worsening over the last 2-3 days. She has been taking Allegra daily, Nasacort, and as needed Sudafed which has been somewhat helpful. She reports of over the last 2 to 3 days her voice has become hoarse and she feels as though she is losing her voice. Denies any fevers, shortness of breath, or chest congestion. No known sick exposures.     Patient Active Problem List    Diagnosis    Ulcerative colitis, chronic (HCC)    Asthma, exercise induced    Allergy to environmental factors      Past Medical History:   Diagnosis Date    Asthma      Past Surgical History:   Procedure Laterality Date    HX KNEE ARTHROSCOPY Bilateral     Meniscus     Social History     Socioeconomic History    Marital status: UNKNOWN     Spouse name: Not on file    Number of children: Not on file    Years of education: Not on file    Highest education level: Not on file   Occupational History    Not on file   Social Needs    Financial resource strain: Not on file    Food insecurity     Worry: Not on file     Inability: Not on file    Transportation needs     Medical: Not on file     Non-medical: Not on file   Tobacco Use    Smoking status: Never Smoker    Smokeless tobacco: Never Used   Substance and Sexual Activity    Alcohol use: Yes     Comment: occasionally    Drug use: Never    Sexual activity: Yes     Partners: Male     Birth control/protection: None   Lifestyle    Physical activity     Days per week: Not on file     Minutes per session: Not on file    Stress: Not on file   Relationships    Social connections     Talks on phone: Not on file     Gets together: Not on file Attends Jew service: Not on file     Active member of club or organization: Not on file     Attends meetings of clubs or organizations: Not on file     Relationship status: Not on file    Intimate partner violence     Fear of current or ex partner: Not on file     Emotionally abused: Not on file     Physically abused: Not on file     Forced sexual activity: Not on file   Other Topics Concern    Not on file   Social History Narrative    Not on file     Family History   Problem Relation Age of Onset    Hypertension Mother    Francisco Can Elevated Lipids Mother     No Known Problems Father     Elevated Lipids Sister     Hypertension Sister      Allergies   Allergen Reactions    Corn Rash    Neosporin [Neomycin-Bacitracnzn-Polymyxnb] Hives    Tomato Rash       Current Outpatient Medications   Medication Sig Dispense Refill    amoxicillin-clavulanate (AUGMENTIN) 875-125 mg per tablet Take 1 Tab by mouth two (2) times a day for 10 days. 20 Tab 0    B.infantis-B.ani-B.long-B.bifi (Probiotic 4X) 10-15 mg TbEC Take  by mouth.  mesalamine DR (Asacol HD) 800 mg DR tablet Asacol  mg tablet,delayed release   Prescribed by [facility] MD      fexofenadine (Allegra Allergy) 180 mg tablet Allegra Allergy 180 mg tablet             The medications were reviewed and updated in the medical record. The past medical history, past surgical history, and family history were reviewed and updated in the medical record. REVIEW OF SYSTEMS   Review of Systems   Constitutional: Negative for malaise/fatigue. HENT: Positive for congestion, sinus pain and sore throat (voice hoarseness). Eyes: Negative for blurred vision and pain. Respiratory: Negative for cough and shortness of breath. Cardiovascular: Negative for chest pain and palpitations. Gastrointestinal: Negative for abdominal pain and heartburn. Genitourinary: Negative for frequency and urgency. Musculoskeletal: Negative for joint pain and myalgias. Neurological: Negative for dizziness, tingling, sensory change, weakness and headaches. Psychiatric/Behavioral: Negative for depression, memory loss and substance abuse. PHYSICAL EXAM   There were no vitals taken for this visit. Physical Exam  Vitals signs and nursing note reviewed. HENT:      Head: Normocephalic and atraumatic. Right Ear: External ear normal.      Left Ear: External ear normal.      Nose:      Right Sinus: Maxillary sinus tenderness present. Left Sinus: Maxillary sinus tenderness present. Comments: Patient reported Max. Sinus TTP. Cardiovascular:      Rate and Rhythm: Normal rate and regular rhythm. Heart sounds: Normal heart sounds. Pulmonary:      Effort: Pulmonary effort is normal.      Breath sounds: Normal breath sounds. Musculoskeletal: Normal range of motion. Skin:     General: Skin is warm and dry. Neurological:      Mental Status: She is alert and oriented to person, place, and time. Gait: Gait is intact. Psychiatric:         Mood and Affect: Affect normal.         Judgment: Judgment normal.       ASSESSMENT/ PLAN   Diagnoses and all orders for this visit:    1. Acute recurrent maxillary sinusitis  -     amoxicillin-clavulanate (AUGMENTIN) 875-125 mg per tablet; Take 1 Tab by mouth two (2) times a day for 10 days. Instructed to take with food and a probiotic.  -Sinus Treatments:  1. Nasal rinses:  Saline rinses or salt water rinses or Neti pot  2. Anti-histamines: allegra (fenofexadine) or claritn (loratidine) or zyrtec (certizine)  3. Nasal steroids: Nasacort and Flonase (are over the counter & prescription)    I was at home while conducting this encounter. Consent:  She and/or her healthcare decision maker is aware that this patient-initiated Telehealth encounter is a billable service, with coverage as determined by her insurance carrier.  She is aware that she may receive a bill and has provided verbal consent to proceed: Yes    This virtual visit was conducted via [address] Pursuant to the emergency declaration under the [facility], [facility] waiver authority and the [place] [employer] and [employer] Act, this Virtual  Visit was conducted to reduce the patient's risk of exposure to COVID-19 and provide continuity of care for an established patient. Services were provided through a video synchronous discussion virtually to substitute for in-person clinic visit. Due to this being a TeleHealth evaluation, many elements of the physical examination are unable to be assessed. Total Time: minutes: 11-20 minutes. Disclaimer:  Advised patient to call back or return to office if symptoms worsen/change/persist.  Discussed expected course/resolution/complications of diagnosis in detail with patient.     Medication risks/benefits/alternatives discussed with patient. Patient was given an after visit summary which includes diagnoses, current medications, & vitals.      Discussed patient instructions and advised to read to all patient instructions regarding care.      Patient expressed understanding with the diagnosis and plan. This note will not be viewable in [address]         Jason Brennan NP  10/6/2020        (This document has been electronically signed)

## 2020-12-09 ENCOUNTER — TRANSCRIBE ORDER (OUTPATIENT)
Dept: SCHEDULING | Age: 45
End: 2020-12-09

## 2020-12-09 DIAGNOSIS — J32.4 CHRONIC PANSINUSITIS: Primary | ICD-10-CM

## 2020-12-09 DIAGNOSIS — J34.2 DEVIATED NASAL SEPTUM: ICD-10-CM

## 2020-12-17 ENCOUNTER — HOSPITAL ENCOUNTER (OUTPATIENT)
Dept: CT IMAGING | Age: 45
Discharge: HOME | End: 2020-12-17
Payer: OTHER GOVERNMENT

## 2020-12-17 DIAGNOSIS — J34.2 DEVIATED NASAL SEPTUM: ICD-10-CM

## 2020-12-17 DIAGNOSIS — J32.4 CHRONIC PANSINUSITIS: ICD-10-CM

## 2020-12-17 PROCEDURE — 70486 CT MAXILLOFACIAL W/O DYE: CPT | Performed by: OTOLARYNGOLOGY

## 2021-02-01 NOTE — ANCILLARY DISCHARGE INSTRUCTIONS
[facility] [employer] [facility]  [address] [facility], [address]  Phone: [phone]  Fax: [phone]    Discharge Summary    Name: Sweetie Newton   : 1975   MD: Vaughn Norman MD       Treatment Diagnosis: Myalgia, other site [M79.18]  Start of Care:     Visits from Start of Care: 9  Missed Visits: 1 cancel    Summary of Care:Pt was seen from  through 10/01. Pt was doing very well at last visit and planned to continue on her own with HEP. We discussed that she could schedule if she had any increase in pain. As pt has not returned to PT we will D/C to HEP at this time.          Dennis Castellanos, PT 2021 9:44 AM

## 2021-07-08 ENCOUNTER — OFFICE VISIT (OUTPATIENT)
Dept: PRIMARY CARE CLINIC | Age: 46
End: 2021-07-08
Payer: OTHER GOVERNMENT

## 2021-07-08 VITALS
WEIGHT: 134.8 LBS | TEMPERATURE: 97.8 F | HEART RATE: 69 BPM | HEIGHT: 65 IN | BODY MASS INDEX: 22.46 KG/M2 | SYSTOLIC BLOOD PRESSURE: 99 MMHG | DIASTOLIC BLOOD PRESSURE: 63 MMHG | OXYGEN SATURATION: 100 % | RESPIRATION RATE: 16 BRPM

## 2021-07-08 DIAGNOSIS — M26.69 TMJ INFLAMMATION: ICD-10-CM

## 2021-07-08 DIAGNOSIS — M26.621 TMJ TENDERNESS, RIGHT: Primary | ICD-10-CM

## 2021-07-08 PROCEDURE — 99214 OFFICE O/P EST MOD 30 MIN: CPT | Performed by: FAMILY MEDICINE

## 2021-07-08 RX ORDER — NAPROXEN 500 MG/1
500 TABLET ORAL 2 TIMES DAILY WITH MEALS
Qty: 28 TABLET | Refills: 0 | Status: SHIPPED | OUTPATIENT
Start: 2021-07-08 | End: 2021-07-22

## 2021-07-08 RX ORDER — DICLOFENAC SODIUM 10 MG/G
2 GEL TOPICAL EVERY 6 HOURS
Qty: 100 G | Refills: 0 | Status: SHIPPED | OUTPATIENT
Start: 2021-07-08

## 2021-07-08 RX ORDER — CYCLOBENZAPRINE HCL 10 MG
10 TABLET ORAL
Qty: 20 TABLET | Refills: 0 | Status: SHIPPED | OUTPATIENT
Start: 2021-07-08 | End: 2022-01-28 | Stop reason: ALTCHOICE

## 2021-07-08 NOTE — PROGRESS NOTES
Subjective:     Chief Complaint   Patient presents with    TMJ     - face pain , ear and neck pain         She  is a 41y.o. year old female who presents for evaluation of TMJ pain. Patient has hx of TMJ pain. Has been using . Woke up 3 weeks ago with a pain in right side of the TMJ. Clenching her teeth most likley triggered the symptoms. Pain radiates to her ear, neck, right side of the face. Has been using Tylenol, warm compression which helped some. Pertinent items are noted in HPI. Objective:     Vitals:    07/08/21 0744   BP: 99/63   Pulse: 69   Resp: 16   Temp: 97.8 °F (36.6 °C)   TempSrc: Temporal   SpO2: 100%   Weight: 134 lb 12.8 oz (61.1 kg)   Height: 5' 5\" (1.651 m)       Physical Examination: General appearance - alert, well appearing, and in no distress, oriented to person, place, and time and normal appearing weight  Mental status - alert, oriented to person, place, and time, normal mood, behavior, speech, dress, motor activity, and thought processes  Ears - bilateral TM's and external ear canals normal  Mouth - mucous membranes moist, pharynx normal without lesions and left TMJ exam normal. TTP over right TMJ with limited ROM. No redness or swelling noted. No clicking.    Neck - supple, no significant adenopathy    Allergies   Allergen Reactions    Corn Rash    Neosporin [Neomycin-Bacitracnzn-Polymyxnb] Hives    Tomato Rash      Social History     Socioeconomic History    Marital status:      Spouse name: Not on file    Number of children: Not on file    Years of education: Not on file    Highest education level: Not on file   Tobacco Use    Smoking status: Never Smoker    Smokeless tobacco: Never Used   Vaping Use    Vaping Use: Never used   Substance and Sexual Activity    Alcohol use: Yes     Comment: occasionally    Drug use: Never    Sexual activity: Yes     Partners: Male     Birth control/protection: None     Social Determinants of Health     Financial Resource Strain:     Difficulty of Paying Living Expenses:    Food Insecurity:     Worried About Running Out of Food in the Last Year:     [address] in the Last Year:    Transportation Needs:     Lack of Transportation (Medical):  Lack of Transportation (Non-Medical):    Physical Activity:     Days of Exercise per Week:     Minutes of Exercise per Session:    Stress:     Feeling of Stress :    Social Connections:     Frequency of Communication with Friends and Family:     Frequency of Social Gatherings with Friends and Family:     Attends [employer] Services:     Active Member of Clubs or Organizations:     Attends Club or Organization Meetings:     Marital Status:       Family History   Problem Relation Age of Onset    Hypertension Mother     Elevated Lipids Mother     No Known Problems Father     Elevated Lipids Sister     Hypertension Sister       Past Surgical History:   Procedure Laterality Date    HX KNEE ARTHROSCOPY Bilateral     Meniscus      Past Medical History:   Diagnosis Date    Asthma       Current Outpatient Medications   Medication Sig Dispense Refill    B.infantis-B.ani-B.long-B.bifi (Probiotic 4X) 10-15 mg TbEC Take  by mouth.  mesalamine DR (Asacol HD) 800 mg DR tablet Asacol  mg tablet,delayed release   Prescribed by non [address] MD      fexofenadine (Allegra Allergy) 180 mg tablet Allegra Allergy 180 mg tablet          Assessment/ Plan:   Diagnoses and all orders for this visit:    1. TMJ tenderness, right  -     diclofenac (VOLTAREN) 1 % gel; Apply 2 g to affected area every six (6) hours. -     cyclobenzaprine (FLEXERIL) 10 mg tablet; Take 1 Tablet by mouth nightly.  -     naproxen (NAPROSYN) 500 mg tablet; Take 1 Tablet by mouth two (2) times daily (with meals) for 14 days. Advised that she can take the Flexeril and the diclofenac gel to try first to see if that helps with the pain.  If not then she can take naproxen( has HX of UC) as less days as possible. Warm compression. Will consider PT if not better. 2. TMJ inflammation  -     diclofenac (VOLTAREN) 1 % gel; Apply 2 g to affected area every six (6) hours. -     cyclobenzaprine (FLEXERIL) 10 mg tablet; Take 1 Tablet by mouth nightly.  -     naproxen (NAPROSYN) 500 mg tablet; Take 1 Tablet by mouth two (2) times daily (with meals) for 14 days. Medication risks/benefits/costs/interactions/alternatives discussed with patient. Advised patient to call back or return to office if symptoms worsen/change/persist. If patient cannot reach us or should anything more severe/urgent arise he/she should proceed directly to the nearest emergency department. Discussed expected course/resolution/complications of diagnosis in detail with patient. Patient given a written after visit summary which includes her diagnoses, current medications and vitals. Patient expressed understanding with the diagnosis and plan. Follow-up and Dispositions    · Return if symptoms worsen or fail to improve.

## 2021-07-08 NOTE — PROGRESS NOTES
Identified pt with two pt identifiers(name and ). Chief Complaint   Patient presents with    TMJ     - face pain , ear and neck pain         3 most recent PHQ Screens 2020   Little interest or pleasure in doing things Not at all   Feeling down, depressed, irritable, or hopeless Not at all   Total Score PHQ 2 0        Vitals:    21 0744   BP: 99/63   Pulse: 69   Resp: 16   Temp: 97.8 °F (36.6 °C)   TempSrc: Temporal   SpO2: 100%   Weight: 134 lb 12.8 oz (61.1 kg)   Height: 5' 5\" (1.651 m)   PainSc:   3   PainLoc: Jaw   LMP: 2021       Health Maintenance Due   Topic    Hepatitis C Screening     Pneumococcal 0-64 years (1 of 2 - PPSV23)    DTaP/Tdap/Td series (1 - Tdap)       1. Have you been to the ER, urgent care clinic since your last visit? Hospitalized since your last visit? No    2. Have you seen or consulted any other health care providers outside of the [address] [place] [address] since your last visit? Include any pap smears or colon screening.  ENT - Janak Keen

## 2021-08-11 DIAGNOSIS — Z11.59 ENCOUNTER FOR HEPATITIS C SCREENING TEST FOR LOW RISK PATIENT: ICD-10-CM

## 2021-08-11 DIAGNOSIS — E78.5 MILD HYPERLIPIDEMIA: ICD-10-CM

## 2021-08-11 DIAGNOSIS — Z00.00 WELL ADULT ON ROUTINE HEALTH CHECK: Primary | ICD-10-CM

## 2021-08-30 ENCOUNTER — OFFICE VISIT (OUTPATIENT)
Dept: PRIMARY CARE CLINIC | Age: 46
End: 2021-08-30
Payer: OTHER GOVERNMENT

## 2021-08-30 VITALS
TEMPERATURE: 97.8 F | WEIGHT: 134.6 LBS | SYSTOLIC BLOOD PRESSURE: 98 MMHG | HEART RATE: 64 BPM | OXYGEN SATURATION: 100 % | DIASTOLIC BLOOD PRESSURE: 63 MMHG | HEIGHT: 65 IN | BODY MASS INDEX: 22.42 KG/M2 | RESPIRATION RATE: 16 BRPM

## 2021-08-30 DIAGNOSIS — K51.90 CHRONIC ULCERATIVE COLITIS WITHOUT COMPLICATION, UNSPECIFIED LOCATION (HCC): ICD-10-CM

## 2021-08-30 DIAGNOSIS — E78.5 MILD HYPERLIPIDEMIA: ICD-10-CM

## 2021-08-30 DIAGNOSIS — Z00.00 WELL ADULT ON ROUTINE HEALTH CHECK: Primary | ICD-10-CM

## 2021-08-30 DIAGNOSIS — Z23 NEED FOR DIPHTHERIA-TETANUS-PERTUSSIS (TDAP) VACCINE: ICD-10-CM

## 2021-08-30 PROCEDURE — 90715 TDAP VACCINE 7 YRS/> IM: CPT | Performed by: FAMILY MEDICINE

## 2021-08-30 PROCEDURE — 99396 PREV VISIT EST AGE 40-64: CPT | Performed by: FAMILY MEDICINE

## 2021-08-30 PROCEDURE — 90471 IMMUNIZATION ADMIN: CPT | Performed by: FAMILY MEDICINE

## 2021-08-30 RX ORDER — LEVOFLOXACIN 500 MG/1
500 TABLET, FILM COATED ORAL DAILY
COMMUNITY
Start: 2021-08-23 | End: 2022-01-28 | Stop reason: ALTCHOICE

## 2021-08-30 NOTE — PROGRESS NOTES
Subjective:   41 y.o. female for Well Woman Check. Labs are done already and discussed. Her gyne and breast care is done elsewhere by her Ob-Gyne physician. Has been regularly following up with GI for UC. Stable. She has a new GI, she will let me know the name. Has been following up with ENT for chronic sinusitis. She is currently being treated for sinus infection. Patient Active Problem List   Diagnosis Code    Ulcerative colitis, chronic (HCC) K51.90    Asthma, exercise induced J45.990    Allergy to environmental factors Z91.09     Current Outpatient Medications   Medication Sig Dispense Refill    levoFLOXacin (LEVAQUIN) 500 mg tablet Take 500 mg by mouth daily.  diclofenac (VOLTAREN) 1 % gel Apply 2 g to affected area every six (6) hours. 100 g 0    B.infantis-B.ani-B.long-B.bifi (Probiotic 4X) 10-15 mg TbEC Take  by mouth.  mesalamine DR (Asacol HD) 800 mg DR tablet Asacol  mg tablet,delayed release   Prescribed by non [address] MD      fexofenadine (Allegra Allergy) 180 mg tablet Allegra Allergy 180 mg tablet      cyclobenzaprine (FLEXERIL) 10 mg tablet Take 1 Tablet by mouth nightly. (Patient not taking: Reported on 8/30/2021) 20 Tablet 0     Allergies   Allergen Reactions    Corn Rash    Neosporin [Neomycin-Bacitracnzn-Polymyxnb] Hives    Tomato Rash     Past Medical History:   Diagnosis Date    Asthma         Lab Results   Component Value Date/Time    WBC 5.1 08/23/2021 08:18 AM    HGB 12.7 08/23/2021 08:18 AM    HCT 40.1 08/23/2021 08:18 AM    PLATELET 111 44/10/9780 08:18 AM    MCV 91.6 08/23/2021 08:18 AM     Lab Results   Component Value Date/Time    Cholesterol, total 204 (H) 08/23/2021 08:18 AM    HDL Cholesterol 70 08/23/2021 08:18 AM    LDL, calculated 119.2 (H) 08/23/2021 08:18 AM    Triglyceride 74 08/23/2021 08:18 AM    CHOL/HDL Ratio 2.9 08/23/2021 08:18 AM     Lab Results   Component Value Date/Time    ALT (SGPT) 26 08/23/2021 08:18 AM    Alk.  phosphatase 53 08/23/2021 08:18 AM    Bilirubin, total 0.5 08/23/2021 08:18 AM    Albumin 4.1 08/23/2021 08:18 AM    Protein, total 7.1 08/23/2021 08:18 AM    PLATELET 036 62/44/6424 08:18 AM     Lab Results   Component Value Date/Time    GFR est non-AA >60 08/23/2021 08:18 AM    GFR est AA >60 08/23/2021 08:18 AM    Creatinine 0.71 08/23/2021 08:18 AM    BUN 12 08/23/2021 08:18 AM    Sodium 138 08/23/2021 08:18 AM    Potassium 4.1 08/23/2021 08:18 AM    Chloride 106 08/23/2021 08:18 AM    CO2 28 08/23/2021 08:18 AM     Lab Results   Component Value Date/Time    TSH 1.760 08/23/2021 08:18 AM      Lab Results   Component Value Date/Time    Hemoglobin A1c 5.1 08/23/2021 08:18 AM         ROS: Feeling generally well. No TIA's or unusual headaches, no dysphagia. No prolonged cough. No dyspnea or chest pain on exertion. No abdominal pain, change in bowel habits, black or bloody stools. No urinary tract symptoms. No new or unusual musculoskeletal symptoms. Specific concerns today: need a physical form filled out for work. .    Objective: The patient appears well, alert, oriented x 3, in no distress. Visit Vitals  BP 98/63 (BP 1 Location: Left upper arm, BP Patient Position: Sitting, BP Cuff Size: Adult)   Pulse 64   Temp 97.8 °F (36.6 °C) (Temporal)   Resp 16   Ht 5' 5\" (1.651 m)   Wt 134 lb 9.6 oz (61.1 kg)   LMP 08/18/2021 (Exact Date)   SpO2 100%   BMI 22.40 kg/m²     ENT normal.  Neck supple. No adenopathy or thyromegaly. FAUSTO. Lungs are clear, good air entry, no wheezes, rhonchi or rales. S1 and S2 normal, no murmurs, regular rate and rhythm. Abdomen soft without tenderness, guarding, mass or organomegaly. Extremities show no edema, normal peripheral pulses. Neurological is normal, no focal findings. Breast and Pelvic exams are deferred. Assessment/Plan:   Well Woman  follow low fat diet, follow low salt diet, continue present plan, call if any problems    ICD-10-CM ICD-9-CM    1.  Well adult on routine health check Z00.00 V70.0    2. Need for diphtheria-tetanus-pertussis (Tdap) vaccine  Z23 V06.1 TETANUS, DIPHTHERIA TOXOIDS AND ACELLULAR PERTUSSIS VACCINE (TDAP), IN INDIVIDS. >=7, IM     Diagnoses and all orders for this visit:    1. Well adult on routine health check     Lab result discussed with patient. Mild HLD otherwise normal.  2. Need for diphtheria-tetanus-pertussis (Tdap) vaccine  -     TETANUS, DIPHTHERIA TOXOIDS AND ACELLULAR PERTUSSIS VACCINE (TDAP), IN INDIVIDS. >=7, IM    3. Mild hyperlipidemia      Continue work on diet and exercise. 4. Chronic ulcerative colitis without complication, unspecified location ([facility]     Stable with current meds. Follow-up and Dispositions    · Return in about 1 year (around 8/30/2022), or if symptoms worsen or fail to improve.        current treatment plan is effective, no change in therapy  lab results and schedule of future lab studies reviewed with patient

## 2021-08-30 NOTE — PROGRESS NOTES
Identified pt with two pt identifiers(name and ). Chief Complaint   Patient presents with    Physical        3 most recent PHQ Screens 2021   Little interest or pleasure in doing things Not at all   Feeling down, depressed, irritable, or hopeless Not at all   Total Score PHQ 2 0        Vitals:    21 0832   BP: 98/63   Pulse: 64   Resp: 16   Temp: 97.8 °F (36.6 °C)   TempSrc: Temporal   SpO2: 100%   Weight: 134 lb 9.6 oz (61.1 kg)   Height: 5' 5\" (1.651 m)   PainSc:   0 - No pain   LMP: 2021       Health Maintenance Due   Topic    DTaP/Tdap/Td series (1 - Tdap)       1. Have you been to the ER, urgent care clinic since your last visit? Hospitalized since your last visit? No    2. Have you seen or consulted any other health care providers outside of the [address] [place] [address] since your last visit? Include any pap smears or colon screening.  No

## 2021-11-14 ENCOUNTER — TRANSCRIBE ORDER (OUTPATIENT)
Dept: SCHEDULING | Age: 46
End: 2021-11-14

## 2021-11-14 DIAGNOSIS — Z91.89 OTHER SPECIFIED PERSONAL RISK FACTORS, NOT ELSEWHERE CLASSIFIED: Primary | ICD-10-CM

## 2021-12-07 ENCOUNTER — HOSPITAL ENCOUNTER (OUTPATIENT)
Dept: MRI IMAGING | Age: 46
Discharge: HOME | End: 2021-12-07
Attending: OBSTETRICS & GYNECOLOGY
Payer: OTHER GOVERNMENT

## 2021-12-07 VITALS — BODY MASS INDEX: 21.63 KG/M2 | WEIGHT: 130 LBS

## 2021-12-07 DIAGNOSIS — Z91.89 OTHER SPECIFIED PERSONAL RISK FACTORS, NOT ELSEWHERE CLASSIFIED: ICD-10-CM

## 2021-12-07 PROCEDURE — 77049 MRI BREAST C-+ W/CAD BI: CPT

## 2021-12-07 PROCEDURE — 74011250636 HC RX REV CODE- 250/636: Performed by: OBSTETRICS & GYNECOLOGY

## 2021-12-07 PROCEDURE — A9585 GADOBUTROL INJECTION: HCPCS | Performed by: OBSTETRICS & GYNECOLOGY

## 2021-12-07 RX ADMIN — GADOBUTROL 6 ML: 604.72 INJECTION INTRAVENOUS at 15:00

## 2021-12-21 ENCOUNTER — OFFICE VISIT (OUTPATIENT)
Dept: PRIMARY CARE CLINIC | Age: 46
End: 2021-12-21
Payer: OTHER GOVERNMENT

## 2021-12-21 VITALS
RESPIRATION RATE: 16 BRPM | SYSTOLIC BLOOD PRESSURE: 99 MMHG | BODY MASS INDEX: 22.66 KG/M2 | HEART RATE: 64 BPM | HEIGHT: 65 IN | DIASTOLIC BLOOD PRESSURE: 65 MMHG | TEMPERATURE: 98 F | WEIGHT: 136 LBS | OXYGEN SATURATION: 99 %

## 2021-12-21 DIAGNOSIS — R07.89 RIGHT-SIDED CHEST WALL PAIN: ICD-10-CM

## 2021-12-21 DIAGNOSIS — N60.19 FIBROCYSTIC BREAST DISEASE (FCBD), UNSPECIFIED LATERALITY: ICD-10-CM

## 2021-12-21 DIAGNOSIS — N64.4 BREAST PAIN, RIGHT: Primary | ICD-10-CM

## 2021-12-21 PROCEDURE — 99214 OFFICE O/P EST MOD 30 MIN: CPT | Performed by: FAMILY MEDICINE

## 2021-12-21 RX ORDER — IBUPROFEN 600 MG/1
600 TABLET ORAL 2 TIMES DAILY WITH MEALS
Qty: 20 TABLET | Refills: 0 | Status: SHIPPED | OUTPATIENT
Start: 2021-12-21 | End: 2022-01-28 | Stop reason: ALTCHOICE

## 2021-12-21 NOTE — PROGRESS NOTES
Subjective:     Chief Complaint   Patient presents with    Breast Problem     lump in breast had MRI was told it was a cyst but it hurts wants to get it looked at R breast         She  is a 40y.o. year old female who presents for evaluation of right breast and chest wall pain x 1 month. She had MRI breast done on 12/7/2021 which showed fibrocystic breast.   She reports feeling pain in 3 0 clock position of the right breast and chest wall. Moving in certain direction or pressing on that area hurts. Did not try any NSAID or any remedy. Denies any cough, soa, fever, chills, injury. Her mom was diagnosed with breast cancer when she was in her 62s so worry about that. Has been seeing Ob-gyn for breast care regularly. [facility] MRI Results (most recent):  Results from [place] encounter on 12/07/21    MRI BREAST BI W WO CONT    Narrative  EXAM:  MRI BREAST BI W WO CONT    INDICATION: High-risk screening. Lifetime risk of breast cancer is 29.7%. COMPARISON: Outside mammograms on 4/20/2021 and 3/10/2020. No comparison MRI. EXAM: MRI of right and left breast without and with IV contrast Gadavist .    TECHNIQUE: MRI breast without and with IV contrast. Bilateral breast MRI was  performed using a dedicated breast coil without compression with the patient in  the prone position. Precontrast T1-weighted images with fat suppression were  obtained followed by bolus injection of 6 mL of Gadavist . Postcontrast dynamic  and high-resolution images were acquired. Axial inversion recovery imaging was  also performed. The images were analyzed using CAD analysis, enhancement curves,  digital subtraction, and 2 and 3 dimensional reconstructions. FINDINGS:    Background parenchymal enhancement: Moderate on the left and mild on the right. Lymph nodes: No lymphadenopathy. Right breast: There is no suspicious focus of morphology or temporal  enhancement. There are foci. Normal skin and nipple.     Left breast: There is no suspicious focus of morphology or temporal enhancement. Foci and cysts are in the upper outer quadrant. Largest cyst measures 0.8 cm. Normal skin and nipple. Miscellaneous: None. Impression  1. No MRI evidence of malignancy in either breast.  2. Fibrocystic breast disease in the upper outer quadrant of the left correlates  to the comparison mammography. Assessment: ACR BI-RADS category  Right breast: BI-RADS Assessment Category 2: Benign finding. Left breast: BI-RADS Assessment Category 2: Benign finding. Recommendation: Three-dimensional screening mammography in late April, 2022. Screening MRI breast in one year. A negative breast MRI examination speaks strongly against invasive cancer down  to a detection threshold of 3 to 5 mm but may not detect some lower grade or in  situ carcinomas. Therefore, routine clinical and mammographic followup are  recommended. A summary portfolio has been created in PACS. Pertinent items are noted in HPI. Objective:     Vitals:    12/21/21 1425   BP: 99/65   Pulse: 64   Resp: 16   Temp: 98 °F (36.7 °C)   TempSrc: Temporal   SpO2: 99%   Weight: 136 lb (61.7 kg)   Height: 5' 5\" (1.651 m)       Physical Examination: General appearance - alert, well appearing, and in no distress, oriented to person, place, and time and normal appearing weight  Mental status - alert, oriented to person, place, and time, normal mood, behavior, speech, dress, motor activity, and thought processes  Chest - TTP noted over 3 o clock position near the sternal area.   Breasts - cystic breasts otherwise breasts appear normal, no suspicious masses, no skin or nipple changes or axillary nodes    Allergies   Allergen Reactions    Corn Rash    Neosporin [Neomycin-Bacitracnzn-Polymyxnb] Hives    Tomato Rash      Social History     Socioeconomic History    Marital status:    Tobacco Use    Smoking status: Never Smoker    Smokeless tobacco: Never Used   Vaping Use  Vaping Use: Never used   Substance and Sexual Activity    Alcohol use: Yes     Comment: occasionally    Drug use: Never    Sexual activity: Yes     Partners: Male     Birth control/protection: None      Family History   Problem Relation Age of Onset    Hypertension Mother     Elevated Lipids Mother     No Known Problems Father     Elevated Lipids Sister     Hypertension Sister       Past Surgical History:   Procedure Laterality Date    HX KNEE ARTHROSCOPY Bilateral     Meniscus    HX OTHER SURGICAL  01/2021    sinus surgery     HX SEPTOPLASTY  01/2021      Past Medical History:   Diagnosis Date    Asthma       Current Outpatient Medications   Medication Sig Dispense Refill    B.infantis-B.ani-B.long-B.bifi (Probiotic 4X) 10-15 mg TbEC Take  by mouth.  mesalamine DR (Asacol HD) 800 mg DR tablet Asacol  mg tablet,delayed release   Prescribed by non 606/706 Nick Valdez MD      fexofenadine (Allegra Allergy) 180 mg tablet Allegra Allergy 180 mg tablet      levoFLOXacin (LEVAQUIN) 500 mg tablet Take 500 mg by mouth daily. (Patient not taking: Reported on 12/21/2021)      diclofenac (VOLTAREN) 1 % gel Apply 2 g to affected area every six (6) hours. (Patient not taking: Reported on 12/21/2021) 100 g 0    cyclobenzaprine (FLEXERIL) 10 mg tablet Take 1 Tablet by mouth nightly. (Patient not taking: Reported on 8/30/2021) 20 Tablet 0        Assessment/ Plan:   Diagnoses and all orders for this visit:    1. Breast pain, right  -  Warm compression,    ibuprofen (MOTRIN) 600 mg tablet; Take 1 Tablet by mouth two (2) times daily (with meals). 2. Fibrocystic breast disease (FCBD), unspecified laterality  -     ibuprofen (MOTRIN) 600 mg tablet; Take 1 Tablet by mouth two (2) times daily (with meals). 3. Right-sided chest wall pain  -     ibuprofen (MOTRIN) 600 mg tablet; Take 1 Tablet by mouth two (2) times daily (with meals). Reassurance provided. Strongly advised to follow up if any change. Medication risks/benefits/costs/interactions/alternatives discussed with patient. Advised patient to call back or return to office if symptoms worsen/change/persist. If patient cannot reach us or should anything more severe/urgent arise he/she should proceed directly to the nearest emergency department. Discussed expected course/resolution/complications of diagnosis in detail with patient. Patient given a written after visit summary which includes her diagnoses, current medications and vitals. Patient expressed understanding with the diagnosis and plan. Follow-up and Dispositions    · Return if symptoms worsen or fail to improve.

## 2021-12-21 NOTE — PROGRESS NOTES
Identified pt with two pt identifiers(name and ). Chief Complaint   Patient presents with    Breast Problem     lump in breast had MRI was told it was a cyst but it hurts wants to get R breast looked at         3 most recent PHQ Screens 2021   Little interest or pleasure in doing things Not at all   Feeling down, depressed, irritable, or hopeless Not at all   Total Score PHQ 2 0        Vitals:    21 1425   BP: 99/65   Pulse: 64   Resp: 16   Temp: 98 °F (36.7 °C)   TempSrc: Temporal   SpO2: 99%   Weight: 136 lb (61.7 kg)   Height: 5' 5\" (1.651 m)   LMP: 2021       Health Maintenance Due   Topic    Cervical cancer screen     COVID-19 Vaccine (3 - Booster for Moderna series)    Colorectal Cancer Screening Combo        1. Have you been to the ER, urgent care clinic since your last visit? Hospitalized since your last visit? No    2. Have you seen or consulted any other health care providers outside of the [address] [place] [address] since your last visit? Include any pap smears or colon screening.  No

## 2022-01-18 ENCOUNTER — HOSPITAL ENCOUNTER (OUTPATIENT)
Dept: PHYSICAL THERAPY | Age: 47
Discharge: HOME | End: 2022-01-18
Payer: OTHER GOVERNMENT

## 2022-01-18 PROCEDURE — 97110 THERAPEUTIC EXERCISES: CPT | Performed by: PHYSICAL THERAPIST

## 2022-01-18 PROCEDURE — 97161 PT EVAL LOW COMPLEX 20 MIN: CPT | Performed by: PHYSICAL THERAPIST

## 2022-01-18 NOTE — PROGRESS NOTES
[facility]  [address], [place], [address]  Phone: [phone]  Fax: [phone]      PT INITIAL EVALUATION NOTE - [facility] 2-15    Patient Name: Lulu Nicholas  Date:2022  : 1975  [x]  Patient  Verified  Payor:  / Plan: Shad Chowdary 74 / Product Type:  /    In time:300  Out time:355  Total Treatment Time (min): 55  Total Timed Codes (min): 25  1:1 Treatment Time ( [address] only): 25   Visit #: 1      Treatment Area: Leg pain, right [M79.604]    SUBJECTIVE  Any medication changes, allergies to medications, adverse drug reactions, diagnosis change, or new procedure performed?: [] No    [x] Yes (see summary sheet for update)    Current symptoms/chief complaint: 2021 she started to have right heel pain after a long run. After 3-4 miles it was fine. .     Sept/Oct. 2021 she started to have right heel pain after run, long or short. It started in her right heel to her right hamstring. Now, her heel isn't really hurting anymore more unless she runs. Walking during the day is fine but felt some heel pain after hiking. Now, her primary c/o is her right hamstring. She is feeling it all the time - any position (sitting, standing, walking, lying down). Pt reports she has since stopped running because it seems like a bad idea. It didn't really hurt during the running but she would feel it a few hours later - she was limping to walk. She stopped running 2 weeks ago. She reports that her heel is not hurting but she does have the tightness in the hamstring. Pt denies any tingling, numbness or sensory changes and no back pain. She has tried foam roller and epsom salts. Date of onset/injury: Fall  she would run and then have sharp pain in her heel. Aggravated by: Running. Eased by:  Rest.      Pain:   7/10 max 0/10 min 3/10 now       Location and description of symptoms: right posterior mid thigh. \"Tight. \" \"Dull. \"      Diagnostic Tests: none. PMHX: Significant for right and left knee arthroscopy 2000 and 2001, asthma. 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 Prior level of function: prior to July 2021 she was able to run without pain. Patient goal(s): \"ease hamstring tightness. \"      Objective:    Posture/Observations: Pt with sufficient lordosis. Gait: Ambulation: pt ambulates without increased pain, noted slight right lateral trunk lean. ROM:  Full ankle DF, PF, inversion, eversion on the right ankle. Lumbar AROM:  FF: notes some discomfort in left hamstring, ext WNL and no pain, SB WNL and no pain. Strength:  Prone hamstring:  R 4/5 with pain at 90 deg, 4/5 at 45 deg with mild pain. L 5/5 at 90 deg and 45 deg. Right ankle:  5/5 DF, PF, inversion, eversion. Heel raises:  R: able to perform x 10 but decreased coordination / speed and noting increased pain in hamstring. L:  Able to perform x 10 without difficulty. Prone hip ext:  Able to perform but recruits hamstring prior to glutes on either side. ASLR:  Able to perform with mild pelvic drop, no pain. Functional Biomechanical Screen  SLS:Able to hold 10 sec either LE. Single Limb Squat:Able to perform x 5 reps but shows decreased pelvic and femur stability on either LE. Palpation:  Tenderness to palpation: TTP severe mid hamstring both biceps femoris and S.M and S.T. No TTP gastroc. Muscles. Optional Tests    Hamstring 90/90 Test: Mild decrease in length right compared to left but with increased pain (approx 10-20 deg from TKE). 25 min Therapeutic Exercise:  [x] See flow sheet : see HEP sheet and pt performed all. Rationale: increase strength and improve coordination to improve the patients ability to run.             With   [] TE   [] TA   [] neuro   [] other: Patient Education: [x] Review HEP    [] Progressed/Changed HEP based on:   [] positioning   [] body mechanics   [] transfers   [] heat/ice application    [] other:        Pain Level (0-10 scale) post treatment: no inc. In pain.        Assessment: See POC    Plan: See POC    Lindsay Saeed, PT, DPT, OCS    1/18/2022    3:00 PM

## 2022-01-18 NOTE — PROGRESS NOTES
[facility]  [address]  [place], [address]  Phone: [phone]  Fax: [phone]    Plan of Care/Statement of Necessity for Physical Therapy Services  2-15    Patient name: Kelli Laboy  : 1975  Provider#: [id number]  Referral source: Maria Antonia Ramírez MD      Medical/Treatment Diagnosis: Leg pain, right [M79.604]     Prior Hospitalization: see medical history     Comorbidities: see evaluation. Prior Level of Function: see evaluation. Medications: Verified on Patient Summary List    Start of Care: see evaluation. Onset Date: See evaluation       The Plan of Care and following information is based on the information from the initial evaluation. Assessment/ key information: Pt is a 41 yo female with increased right posterior thigh pain that increases after she runs. Pt reports symptoms began in her right heel 2021 with running and gradually right heel pain decreased and right posterior thigh pain increased. Pt reports since 2021 she began to have pain and antalgic gait pattern after running due to right posterior thigh pain (within 1-3 hr after running). Pt reports she stopped running 2 weeks ago due to this pain but the dull / tight sensation in her right posterior thigh has continued. Pt presents with increased pain with manual muscle testing of right hamstring, increased TTP right biceps femoris, S.M. and S.T. muscles, decreased stability with unilateral squat, increased pain with right unilateral heel raise and constant right hamstring tightness. Pt will benefit from skilled PT. Pt is coming under direct access, discussed asking her PCP to write a referral if PT is to extend beyond 30 days.           Treatment Plan may include any combination of the following: Therapeutic exercise, Therapeutic activities, Neuromuscular re-education, Physical agent/modality, Gait/balance training, Manual therapy, Patient education and Self Care training  Patient / Family readiness to learn indicated by: asking questions, trying to perform skills and interest  Persons(s) to be included in education: patient (P)  Barriers to Learning/Limitations: None  Patient Goal (s): see eval  Patient Self Reported Health Status: good  Rehabilitation Potential: good    Short Term Goals: To be accomplished in 2-3 weeks:   1) Pt independent in HEP from day 1   2) Pt will report right hamstring tightness/pain is intermittent versus constant. 3) Pt will report at rest or with walking she does not have right hamstring pain. Long Term Goals: To be accomplished in 4-8 weeks:   1) Pt independent in final HEP. 2) Pt will be able to run 30 minutes without right hamstring pain during or within 24 hours after. 3) Pt will be able to hike for 30 ' or more without right hamstring pain. 4) Pt will test 5/5 in prone with right hamstring without increased pain to demonstrate improved strength to be able to run and participate in pilates without increased pain or symptoms. Frequency / Duration: Patient to be seen 1-2 times per week for 4-8 weeks. Patient/ Caregiver education and instruction: self care and exercises    [x]  Plan of care has been reviewed with CLAIR Nielson, PT DPT, OCS 1/18/2022 3:00 PM    ________________________________________________________________________    I certify that the above Therapy Services are being furnished while the patient is under my care. I agree with the treatment plan and certify that this therapy is necessary.     [de-identified] Signature:____________________  Date:____________Time: _________

## 2022-01-20 ENCOUNTER — APPOINTMENT (OUTPATIENT)
Dept: PHYSICAL THERAPY | Age: 47
End: 2022-01-20
Payer: OTHER GOVERNMENT

## 2022-01-25 ENCOUNTER — HOSPITAL ENCOUNTER (OUTPATIENT)
Dept: PHYSICAL THERAPY | Age: 47
Discharge: HOME | End: 2022-01-25
Payer: OTHER GOVERNMENT

## 2022-01-25 PROCEDURE — 97140 MANUAL THERAPY 1/> REGIONS: CPT | Performed by: PHYSICAL THERAPIST

## 2022-01-25 PROCEDURE — 97110 THERAPEUTIC EXERCISES: CPT | Performed by: PHYSICAL THERAPIST

## 2022-01-25 NOTE — PROGRESS NOTES
PT DAILY TREATMENT NOTE - [facility] 2-15    Patient Name: Dominic Matthew  Date:2022  : 1975  [x]  Patient  Verified  Payor:  / Plan: Shad Chowdary 74 / Product Type:  /    In time:1230  Out time:120  Total Treatment Time (min): 50  Total Timed Codes (min): 50  1:1 Treatment Time ( [address] only): 50   Visit #: 2    Treatment Area: Leg pain, right [M79.604]    SUBJECTIVE  Pain Level (0-10 scale), subjective functional status/changes: Pt reports hamstring isn't bothering her but inc. Pain in her heel with walking, about 3/10 with walking but standing does not have pain. Any medication changes, allergies to medications, adverse drug reactions, diagnosis change, or new procedure performed?: [x] No    [] Yes (see summary sheet for update) SEE ABOVE. OBJECTIVE     Gait:  Antalgic gait (R), decreased weight shift to the R and decreased stance time on the R.     Palpation:  TTP R posterior / inferior calcaneous and R biceps femoris. Pt to ice at home min Modality:      []  Ice     []  Heat       Position/location:   -   Rationale: decrease pain to improve the patients ability to do functional activities   [x] Skin assessment post-treatment:  [x]intact []redness- no adverse reaction    []redness  adverse reaction:      40 min Therapeutic Exercise:  [x] See flow sheet : progressed per flow sheet. Rationale: increase strength, improve coordination and increase proprioception to improve the patients ability to run, walk without pain. 10 min Manual Therapy:  STM and TPR to right biceps femoris   Rationale: decrease pain, increase tissue extensibility and decrease trigger points  to improve the patients ability to run, walk without pain.             With   [] TE   [] TA   [] neuro   [] other: Patient Education: [x] Review HEP    [] Progressed/Changed HEP based on:   [] positioning   [] body mechanics   [] transfers   [] heat/ice application    [] other:        Pain Level (0-10 scale) post treatment: 0/10 pain with DLS but with walking inc. Pain R heel 3/10. Pt to ice at home. ASSESSMENT/Changes in Function:   Pt with increased R heel pain today after being on her feet as . Pt has already made an appt. With primary care for Friday which we encouraged as she is under direct access. Patient will continue to benefit from skilled PT services to modify and progress therapeutic interventions, address strength deficits, analyze and address soft tissue restrictions, assess and modify postural abnormalities and instruct in home and community integration to attain remaining goals. Progress towards goals / Updated goals:  Goals were not re-assessed today.      PLAN      [x]  Continue plan of care    []  Other:_      Aditya Burgos, PT DPT, OCS 1/25/2022  10:59 PM       PT License #[id number]

## 2022-01-28 ENCOUNTER — HOSPITAL ENCOUNTER (OUTPATIENT)
Dept: GENERAL RADIOLOGY | Age: 47
Discharge: HOME | End: 2022-01-28
Attending: FAMILY MEDICINE
Payer: OTHER GOVERNMENT

## 2022-01-28 ENCOUNTER — OFFICE VISIT (OUTPATIENT)
Dept: PRIMARY CARE CLINIC | Age: 47
End: 2022-01-28
Payer: OTHER GOVERNMENT

## 2022-01-28 VITALS
DIASTOLIC BLOOD PRESSURE: 64 MMHG | BODY MASS INDEX: 22.76 KG/M2 | HEART RATE: 64 BPM | RESPIRATION RATE: 16 BRPM | WEIGHT: 136.6 LBS | HEIGHT: 65 IN | SYSTOLIC BLOOD PRESSURE: 99 MMHG | TEMPERATURE: 98.7 F | OXYGEN SATURATION: 100 %

## 2022-01-28 DIAGNOSIS — M79.604 PAIN OF RIGHT LOWER EXTREMITY: ICD-10-CM

## 2022-01-28 DIAGNOSIS — M79.671 PAIN OF RIGHT HEEL: ICD-10-CM

## 2022-01-28 DIAGNOSIS — M79.671 PAIN OF RIGHT HEEL: Primary | ICD-10-CM

## 2022-01-28 PROCEDURE — 99214 OFFICE O/P EST MOD 30 MIN: CPT | Performed by: FAMILY MEDICINE

## 2022-01-28 PROCEDURE — 73650 X-RAY EXAM OF HEEL: CPT

## 2022-01-28 NOTE — PROGRESS NOTES
Identified pt with two pt identifiers(name and ). Chief Complaint   Patient presents with    Referral Request     PT - R leg for plantar fasciitis and stress fracture in heel . ...  had pt already         3 most recent PHQ Screens 2022   Little interest or pleasure in doing things Not at all   Feeling down, depressed, irritable, or hopeless Not at all   Total Score PHQ 2 0        Vitals:    22 0816   BP: 99/64   Pulse: 64   Resp: 16   Temp: 98.7 °F (37.1 °C)   TempSrc: Temporal   SpO2: 100%   Weight: 136 lb 9.6 oz (62 kg)   Height: 5' 5\" (1.651 m)   LMP: 2022       Health Maintenance Due   Topic    Cervical cancer screen     Colorectal Cancer Screening Combo        1. Have you been to the ER, urgent care clinic since your last visit? Hospitalized since your last visit? No    2. Have you seen or consulted any other health care providers outside of the [address] [place] [address] since your last visit? Include any pap smears or colon screening.  No

## 2022-01-28 NOTE — PROGRESS NOTES
Subjective:     Chief Complaint   Patient presents with    Referral Request     PT - R leg for plantar fasciitis and stress fracture in heel . ...  had pt already         She  is a 40y.o. year old female who presents for evaluation of pain in her right haal and leg. Hamstring is very tight and having pain in right heel. Notice pain in her heel first step in morning. She is a runner. Worry about stress fracture. Started getting PT for the above symptoms. Would like to get a referral.  Due to her UC history she does not take any NSAID. Pertinent items are noted in HPI. Objective:     Vitals:    01/28/22 0816   BP: 99/64   Pulse: 64   Resp: 16   Temp: 98.7 °F (37.1 °C)   TempSrc: Temporal   SpO2: 100%   Weight: 136 lb 9.6 oz (62 kg)   Height: 5' 5\" (1.651 m)       Physical Examination: General appearance - alert, well appearing, and in no distress, oriented to person, place, and time and normal appearing weight  Mental status - alert, oriented to person, place, and time, normal mood, behavior, speech, dress, motor activity, and thought processes  Musculoskeletal - no joint tenderness, deformity or swelling  Extremities - no pedal edema noted, feet normal, good pulses, normal color, temperature and sensation, Micah's sign negative. Right heel is TTP.  Flexion, extension, eversion, inversion is normal.     Allergies   Allergen Reactions    Corn Rash    Neosporin [Neomycin-Bacitracnzn-Polymyxnb] Hives    Tomato Rash      Social History     Socioeconomic History    Marital status:    Tobacco Use    Smoking status: Never Smoker    Smokeless tobacco: Never Used   Vaping Use    Vaping Use: Never used   Substance and Sexual Activity    Alcohol use: Yes     Comment: occasionally    Drug use: Never    Sexual activity: Yes     Partners: Male     Birth control/protection: None      Family History   Problem Relation Age of Onset    Hypertension Mother     Elevated Lipids Mother     No Known Problems Father     Elevated Lipids Sister     Hypertension Sister       Past Surgical History:   Procedure Laterality Date    HX KNEE ARTHROSCOPY Bilateral     Meniscus    HX OTHER SURGICAL  01/2021    sinus surgery     HX SEPTOPLASTY  01/2021      Past Medical History:   Diagnosis Date    Asthma       Current Outpatient Medications   Medication Sig Dispense Refill    B.infantis-B.ani-B.long-B.bifi (Probiotic 4X) 10-15 mg TbEC Take  by mouth.  mesalamine DR (Asacol HD) 800 mg DR tablet Asacol  mg tablet,delayed release   Prescribed by non 606/706 Nick Valdez MD      fexofenadine (Allegra Allergy) 180 mg tablet Allegra Allergy 180 mg tablet      diclofenac (VOLTAREN) 1 % gel Apply 2 g to affected area every six (6) hours. (Patient not taking: Reported on 12/21/2021) 100 g 0        Assessment/ Plan:   Diagnoses and all orders for this visit:    1. Pain of right heel  -     XR CALCANEUS RT; Future. -  IMPRESSION         No acute abnormality. Consider podiatry referral if not better. Plantar calcaneal spur. Conservative management discussed. -     REFERRAL TO PHYSICAL THERAPY    2. Pain of right lower extremity  -     XR CALCANEUS RT; Future  -     REFERRAL TO PHYSICAL THERAPY           Medication risks/benefits/costs/interactions/alternatives discussed with patient. Advised patient to call back or return to office if symptoms worsen/change/persist. If patient cannot reach us or should anything more severe/urgent arise he/she should proceed directly to the nearest emergency department. Discussed expected course/resolution/complications of diagnosis in detail with patient. Patient given a written after visit summary which includes her diagnoses, current medications and vitals. Patient expressed understanding with the diagnosis and plan. Follow-up and Dispositions    · Return if symptoms worsen or fail to improve.

## 2022-01-29 NOTE — PROGRESS NOTES
Sent via my chart. Jean Paul Ocampo you are doing will. Xray ankle did not show any stress fracture. Bone spur noted otherwise there is no acute abnormality. Continue therapy, arch support shoes. Will consider podiatry referral if not better. Thanks.   Dr. Cedric Severe

## 2022-02-01 ENCOUNTER — HOSPITAL ENCOUNTER (OUTPATIENT)
Dept: PHYSICAL THERAPY | Age: 47
Discharge: HOME | End: 2022-02-01
Payer: OTHER GOVERNMENT

## 2022-02-01 PROCEDURE — 97110 THERAPEUTIC EXERCISES: CPT | Performed by: PHYSICAL THERAPIST

## 2022-02-01 NOTE — PROGRESS NOTES
PT Re-evaluation / DAILY TREATMENT NOTE - [facility] 2-15    Patient Name: Mary Lyn  Date:2022  : 1975  [x]  Patient  Verified  Payor:  / Plan: Shad Chowdary 74 / Product Type:  /    In time:1235  Out time:120  Total Treatment Time (min): 45  Total Timed Codes (min): 45  1:1 Treatment Time ([facility] only): 45   Visit #: 3    Treatment Area: Leg pain, right [M79.604]    SUBJECTIVE  Pain Level (0-10 scale), subjective functional status/changes: Pt reports hamstring continues to feel very tight. Heel pain is 3/10 while walking. Pt reports saw Dr. Bee Romero, had x-ray and was negative. Dr. Bee Romero recommended continue PT. Any medication changes, allergies to medications, adverse drug reactions, diagnosis change, or new procedure performed?: [x] No    [] Yes (see summary sheet for update) SEE ABOVE. OBJECTIVE     Palpation:  TTP R posterior / inferior calcaneous. TTP R medial tubercle of calcaneus. Neural tension:  + for tightness with SLR on the R, no symptoms on the L. With cupping tissue to increase cushion  and repeat of palpation pt noting decreased pain,      SLS:  Increased right heel pain, noting pt with increased loading lateral border of her foot / slight supination. Cued to plant R MTP and pt noting decrease in heel pain. Pt to ice at home min Modality:      []  Ice     []  Heat       Position/location:   -   Rationale: decrease pain to improve the patients ability to do functional activities   [x] Skin assessment post-treatment:  [x]intact []redness- no adverse reaction    []redness  adverse reaction:      45 min Therapeutic Exercise:  [x] See flow sheet : re-evaluation of her right heel as noted above. Gave progression in HEP as noted in chart. Rationale: increase strength, improve coordination and increase proprioception to improve the patients ability to run, walk without pain.      0 min Manual Therapy:  STM and TPR to right biceps femoris Rationale: decrease pain, increase tissue extensibility and decrease trigger points  to improve the patients ability to run, walk without pain. With   [] TE   [] TA   [] neuro   [] other: Patient Education: [x] Review HEP    [] Progressed/Changed HEP based on:   [] positioning   [] body mechanics   [] transfers   [] heat/ice application    [] other:        Pain Level (0-10 scale) post treatment: 0/10 pain with rest but noting 2-3/10 pain with WB R heel. ASSESSMENT/Changes in Function:   Further assessment of right heel today revealed continue TTP region of R plantar fascia but also general area of R heel which could indicate fat pad atrophy. We did discuss OTC orthotics (SOLE) as well as heel cup in case of fat pad atrophy. Patient will continue to benefit from skilled PT services to modify and progress therapeutic interventions, address strength deficits, analyze and address soft tissue restrictions, assess and modify postural abnormalities and instruct in home and community integration to attain remaining goals. Progress towards goals / Updated goals:  Goals were not re-assessed today.      PLAN      [x]  Continue plan of care    []  Other:_      Kathi Piña, PT DPT, OCS 2/1/2022  00:17 PM       PT License #[id number]

## 2022-02-03 ENCOUNTER — HOSPITAL ENCOUNTER (OUTPATIENT)
Dept: PHYSICAL THERAPY | Age: 47
Discharge: HOME | End: 2022-02-03
Payer: OTHER GOVERNMENT

## 2022-02-03 PROCEDURE — 97110 THERAPEUTIC EXERCISES: CPT | Performed by: PHYSICAL THERAPIST

## 2022-02-03 PROCEDURE — 97140 MANUAL THERAPY 1/> REGIONS: CPT | Performed by: PHYSICAL THERAPIST

## 2022-02-03 NOTE — PROGRESS NOTES
PT DAILY TREATMENT NOTE - [facility] 2-15    Patient Name: Columba Haq  [id number]  : 1975  [x]  Patient  Verified  Payor: STEVE / Plan: Shad Chowdary 74 / Product Type: Corry Vu /    In time:1230  Out time:110  Total Treatment Time (min): 40  Total Timed Codes (min): 40  1:1 Treatment Time ( [address] only): 40   Visit #: 4    Treatment Area: Leg pain, right [M79.604]    SUBJECTIVE  Pain Level (0-10 scale), subjective functional status/changes: Pt reports she has had some brief periods at rest without HS tightness. She got the heel cups last night and started wearing today. Heel pain (right) with ambulation is now dull versus sharp with ambulation and she should be getting the OTC orthotics soon as well. Any medication changes, allergies to medications, adverse drug reactions, diagnosis change, or new procedure performed?: [x] No    [] Yes (see summary sheet for update) SEE ABOVE. OBJECTIVE     Palpation:  TTP R posterior / inferior calcaneous. TTP R medial tubercle of calcaneus. R plantar fascia and right medial plantar aspect foot intrinsics. TTP right soleus and right medial gastroc. Pt to ice at home min Modality:      []  Ice     []  Heat       Position/location:   -   Rationale: decrease pain to improve the patients ability to do functional activities   [x] Skin assessment post-treatment:  [x]intact []redness- no adverse reaction    []redness  adverse reaction:      15 min Therapeutic Exercise:  [x] See flow sheet :    Rationale: increase strength, improve coordination and increase proprioception to improve the patients ability to run, walk without pain. 25 min Manual Therapy:  STM and TPR to right plantar fascia / right medial plantar aspect foot intrinsics, right soleus (medial), right medial gastroc. Taping right heel : tear drop with hypofix and leuko tape for further cushion of right heel.     Rationale: decrease pain, increase tissue extensibility and decrease trigger points  to improve the patients ability to run, walk without pain. With   [] TE   [] TA   [] neuro   [] other: Patient Education: [x] Review HEP    [] Progressed/Changed HEP based on:   [] positioning   [] body mechanics   [] transfers   [] heat/ice application    [] other:        Pain Level (0-10 scale) post treatment: 0/10 pain with rest and decreased pain per pt with WB R heel. ASSESSMENT/Changes in Function:   Pt noting decreased symptoms right hamstring and right heel with use of the heel cups. Patient will continue to benefit from skilled PT services to modify and progress therapeutic interventions, address strength deficits, analyze and address soft tissue restrictions, assess and modify postural abnormalities and instruct in home and community integration to attain remaining goals. Progress towards goals / Updated goals:  Goals were not re-assessed today.      PLAN      [x]  Continue plan of care    [x]  Other:_assess nerve caitlyn Miranda Plan, PT DPT, OCS 2/3/2022  10:30 PM       PT License #[id number]

## 2022-02-08 ENCOUNTER — HOSPITAL ENCOUNTER (OUTPATIENT)
Dept: PHYSICAL THERAPY | Age: 47
Discharge: HOME | End: 2022-02-08
Payer: OTHER GOVERNMENT

## 2022-02-08 PROCEDURE — 97110 THERAPEUTIC EXERCISES: CPT | Performed by: PHYSICAL THERAPIST

## 2022-02-08 PROCEDURE — 97140 MANUAL THERAPY 1/> REGIONS: CPT | Performed by: PHYSICAL THERAPIST

## 2022-02-08 NOTE — PROGRESS NOTES
PT DAILY TREATMENT NOTE - [facility] 2-15    Patient Name: Shahid Thao  Date:2022  : 1975  [x]  Patient  Verified  Payor:  / Plan: [employer] / Product Type:  /    In time: 044  Out time: 656  Total Treatment Time (min): 55  Total Timed Codes (min): 55  1:1 Treatment Time ([address] only): 38  Visit #: 5    Treatment Area: Leg pain, right [M79.604]    SUBJECTIVE  Pain Level (0-10 scale), subjective functional status/changes: Pt reports her heel pain is 2/10. It cont. To be more aching versus sharp. The taping really helped after the last visit, she kept it on for as long as she could (48 hr). Any medication changes, allergies to medications, adverse drug reactions, diagnosis change, or new procedure performed?: [x] No    [] Yes (see summary sheet for update) SEE ABOVE. OBJECTIVE     Palpation:  TTP R posterior / inferior calcaneous. TTP R medial tubercle of calcaneus - mild. R plantar fascia and right medial plantar aspect foot intrinsics. - mild. TTP right soleus and right medial gastroc. Pt to ice at home min Modality:      []  Ice     []  Heat       Position/location:   -   Rationale: decrease pain to improve the patients ability to do functional activities   [x] Skin assessment post-treatment:  [x]intact []redness- no adverse reaction    []redness  adverse reaction:      30 min Therapeutic Exercise:  [x] See flow sheet :    Rationale: increase strength, improve coordination and increase proprioception to improve the patients ability to run, walk without pain. 25 min Manual Therapy:  STM and TPR to right plantar fascia / right medial plantar aspect foot intrinsics, right soleus (medial), right medial gastroc. Taping right heel : tear drop with hypofix and leuko tape for further cushion of right heel. Rationale: decrease pain, increase tissue extensibility and decrease trigger points  to improve the patients ability to run, walk without pain. With   [] TE   [] TA   [] neuro   [] other: Patient Education: [x] Review HEP    [] Progressed/Changed HEP based on:   [] positioning   [] body mechanics   [] transfers   [] heat/ice application    [] other:        Pain Level (0-10 scale) post treatment: 0/10 pain with rest and decreased pain per pt with WB R heel. ASSESSMENT/Changes in Function:   Pt noting relief with taping technique but cont. To have pain with walking and has been unable to run. Patient will continue to benefit from skilled PT services to modify and progress therapeutic interventions, address strength deficits, analyze and address soft tissue restrictions, assess and modify postural abnormalities and instruct in home and community integration to attain remaining goals. Progress towards goals / Updated goals:  Goals were not re-assessed today.      PLAN      [x]  Continue plan of care    [x]  Other:_assess nerve glide       Lindsay Saeed, PT DPT, OCS 2/8/2022  37:54 PM       PT License #[id number]

## 2022-02-15 ENCOUNTER — HOSPITAL ENCOUNTER (OUTPATIENT)
Dept: PHYSICAL THERAPY | Age: 47
Discharge: HOME | End: 2022-02-15
Payer: OTHER GOVERNMENT

## 2022-02-15 PROCEDURE — 97140 MANUAL THERAPY 1/> REGIONS: CPT | Performed by: PHYSICAL THERAPIST

## 2022-02-15 PROCEDURE — 97110 THERAPEUTIC EXERCISES: CPT | Performed by: PHYSICAL THERAPIST

## 2022-02-15 NOTE — PROGRESS NOTES
PT DAILY TREATMENT NOTE - [facility] 2-15    Patient Name: Alberta Trammell  Date:2/15/2022  : 1975  [x]  Patient  Verified  Payor:  / Plan: Shad Chowdary 74 / Product Type:  /    In time: 544  Out time: 650  Total Treatment Time (min): 55  Total Timed Codes (min): 55  1:1 Treatment Time ([facility] only): 50  Visit #: 6    Treatment Area: Leg pain, right [M79.604]    SUBJECTIVE  Pain Level (0-10 scale), subjective functional status/changes: Pt reports her heel pain is 4/10. Pt reports she saw Dr. Edwin Morris at 4:00 so it is a little flare up from his assessment. Dr. Edwin Morris recommended MRI to further rule out a stress fracture. Any medication changes, allergies to medications, adverse drug reactions, diagnosis change, or new procedure performed?: [x] No    [] Yes (see summary sheet for update) SEE ABOVE. OBJECTIVE     Palpation: TTP surrounding calcaneous and medial tubercle. Pt to ice at home min Modality:      []  Ice     []  Heat       Position/location:   -   Rationale: decrease pain to improve the patients ability to do functional activities   [x] Skin assessment post-treatment:  [x]intact []redness- no adverse reaction    []redness  adverse reaction:      45 min Therapeutic Exercise:  [x] See flow sheet : short foot in DLS 10 \" x 10. Tandem stance:  Short foot / controlling pronation. Pre- gait drill :  Heel strike to toe off with cues for control of pronation. Rationale: increase strength, improve coordination and increase proprioception to improve the patients ability to run, walk without pain. 10 min Manual Therapy:      Taping to offload P.F. tear drop, hypofix and leuko tape. Rationale: decrease pain, increase tissue extensibility and decrease trigger points  to improve the patients ability to run, walk without pain.             With   [] TE   [] TA   [] neuro   [] other: Patient Education: [x] Review HEP    [] Progressed/Changed HEP based on:   [] positioning   [] body mechanics   [] transfers   [] heat/ice application    [] other:      Pain Level (0-10 scale) post treatment: 2/10 heel pain, improved. ASSESSMENT/Changes in Function:   Pt with decreased symptoms with taping technique. Pt to have MRI scheduled. Patient will continue to benefit from skilled PT services to modify and progress therapeutic interventions, address strength deficits, analyze and address soft tissue restrictions, assess and modify postural abnormalities and instruct in home and community integration to attain remaining goals. Progress towards goals / Updated goals:  Goals were not re-assessed today.      PLAN      [x]  Continue plan of care    [x]  Other:_assess nerve glide       Will Cortez, PT DPT, OCS 2/15/2022  82:36 PM       PT License #[id number]

## 2022-02-17 ENCOUNTER — HOSPITAL ENCOUNTER (OUTPATIENT)
Dept: PHYSICAL THERAPY | Age: 47
Discharge: HOME | End: 2022-02-17
Payer: OTHER GOVERNMENT

## 2022-02-17 PROCEDURE — 97110 THERAPEUTIC EXERCISES: CPT | Performed by: PHYSICAL THERAPIST

## 2022-02-17 PROCEDURE — 97140 MANUAL THERAPY 1/> REGIONS: CPT | Performed by: PHYSICAL THERAPIST

## 2022-02-17 NOTE — PROGRESS NOTES
PT DAILY TREATMENT NOTE - [facility] 2-15    Patient Name: Lisa Cr  Date:2022  : 1975  [x]  Patient  Verified  Payor:  / Plan: Shad Chowdary 74 / Product Type:  /    In time: 3965  Out time: 145  Total Treatment Time (min): 50  Total Timed Codes (min): 50  1:1 Treatment Time [facility] only):50   Visit #: 7    Treatment Area: Leg pain, right [M79.604]    SUBJECTIVE  Pain Level (0-10 scale), subjective functional status/changes: Pt reports her heel pain is 1-2/10. Pt reports she thinks the tape has been helpful. . it's felt better. She is still trying to get the MRI scheduled. Any medication changes, allergies to medications, adverse drug reactions, diagnosis change, or new procedure performed?: [x] No    [] Yes (see summary sheet for update) SEE ABOVE. OBJECTIVE     Palpation: TTP surrounding calcaneous and medial tubercle, posterior tib. Muscle, medial and lateral gastroc. Muscle bellies. Pt to ice at home min Modality:      []  Ice     []  Heat       Position/location:   -   Rationale: decrease pain to improve the patients ability to do functional activities   [x] Skin assessment post-treatment:  [x]intact []redness- no adverse reaction    []redness  adverse reaction:      35 min Therapeutic Exercise:  [x] See flow sheet :   short foot in DLS 10 \" x 10. Tandem stance:  Short foot / controlling pronation 30 \" x 2. Pre- gait drill :  Heel strike to toe off with cues for control of pronation x 10    Resisted short foot : green TB x 10 seated, x 5-10 standing. Heel raise B/L with eccentric control x 10. Rationale: increase strength, improve coordination and increase proprioception to improve the patients ability to run, walk without pain. 15 min Manual Therapy:      Taping to offload P.F. tear drop, hypofix and leuko tape. IASTM with consent from pt medial and lateral gastroc. Muscles, STM to posterior tib. Muscle.          Rationale: decrease pain, increase tissue extensibility and decrease trigger points  to improve the patients ability to run, walk without pain. With   [] TE   [] TA   [] neuro   [] other: Patient Education: [x] Review HEP    [] Progressed/Changed HEP based on:   [] positioning   [] body mechanics   [] transfers   [] heat/ice application    [] other:      Pain Level (0-10 scale) post treatment:1-2      ASSESSMENT/Changes in Function:   Pt with decreased heel pain as compared to previous visit and notes improvement from taping. Pt waiting to have MRI scheduled. Patient will continue to benefit from skilled PT services to modify and progress therapeutic interventions, address strength deficits, analyze and address soft tissue restrictions, assess and modify postural abnormalities and instruct in home and community integration to attain remaining goals. Progress towards goals / Updated goals:  Goals were not re-assessed today.      PLAN      [x]  Continue plan of care    [x]  Other:_assess nerve glide       Lindsay Saeed, PT DPT, OCS 2/17/2022  92:74 PM       PT License #[id number]

## 2022-02-22 ENCOUNTER — HOSPITAL ENCOUNTER (OUTPATIENT)
Dept: PHYSICAL THERAPY | Age: 47
Discharge: HOME | End: 2022-02-22
Payer: OTHER GOVERNMENT

## 2022-02-22 PROCEDURE — 97140 MANUAL THERAPY 1/> REGIONS: CPT | Performed by: PHYSICAL THERAPIST

## 2022-02-22 PROCEDURE — 97110 THERAPEUTIC EXERCISES: CPT | Performed by: PHYSICAL THERAPIST

## 2022-02-22 NOTE — PROGRESS NOTES
PT DAILY TREATMENT NOTE - [facility] 2-15    Patient Name: lEicia Gunter  Date:2022  : 1975  [x]  Patient  Verified  Payor:  / Plan: Shad Chowdary 74 / Product Type:  /    In time: 105  Out time: 200  Total Treatment Time (min): 55  Total Timed Codes (min): 55  1:1 Treatment Time [facility] only):55   Visit #: 8    Treatment Area: Leg pain, right [M79.604]    SUBJECTIVE  Pain Level (0-10 scale), subjective functional status/changes: Pt reports her heel pain is 0/10. Pt reports she thinks the tape has been helpful but the last time it did not stay on as long. Pt reports her MRI will be in March. Any medication changes, allergies to medications, adverse drug reactions, diagnosis change, or new procedure performed?: [x] No    [] Yes (see summary sheet for update) SEE ABOVE. OBJECTIVE        Pt to ice at home min Modality:      []  Ice     []  Heat       Position/location:   -   Rationale: decrease pain to improve the patients ability to do functional activities   [x] Skin assessment post-treatment:  [x]intact []redness- no adverse reaction    []redness  adverse reaction:      40 min Therapeutic Exercise:  [x] See flow sheet :   short foot in DLS 10 \" x 10. Tandem stance:  Short foot / controlling pronation 30 \" x 4 (on 1/2 foam today)      Pre- gait drill :  Heel strike to toe off with cues for control of pronation x 10    HOLD today Resisted short foot : green TB x 10 seated, x 5-10 standing. Heel raise B/L with eccentric control (review)    Pron/sup. Seated on BAPS, standing on BAPS and standing on Rockerboard. Rationale: increase strength, improve coordination and increase proprioception to improve the patients ability to run, walk without pain. 15 min Manual Therapy:      Low dye taping technique for P. F. with athletic tape. Pt ed.  To keep on no more than 48 hr.        Rationale: decrease pain, increase tissue extensibility and decrease trigger points  to improve the patients ability to run, walk without pain. With   [] TE   [] TA   [] neuro   [] other: Patient Education: [x] Review HEP    [] Progressed/Changed HEP based on:   [] positioning   [] body mechanics   [] transfers   [] heat/ice application    [] other:      Pain Level (0-10 scale) post treatment:1     ASSESSMENT/Changes in Function:   Pt with reduced symptoms today but did have some inc. Pain with progression in ther. Ex. To standing. Trial with low dye tape technique, will f/u with pt on Thursday. Patient will continue to benefit from skilled PT services to modify and progress therapeutic interventions, address strength deficits, analyze and address soft tissue restrictions, assess and modify postural abnormalities and instruct in home and community integration to attain remaining goals. Short Term Goals: To be accomplished in 2-3 weeks:              1) Pt independent in HEP from day 1 MET               2) Pt will report right hamstring tightness/pain is intermittent versus constant. MET  3) Pt will report at rest or with walking she does not have right hamstring pain. Long Term Goals: To be accomplished in 4-8 weeks:              1) Pt independent in final HEP. 2) Pt will be able to run 30 minutes without right hamstring pain during or within 24 hours after. 3) Pt will be able to hike for 30 ' or more without right hamstring pain. 4) Pt will test 5/5 in prone with right hamstring without increased pain to demonstrate improved strength to be able to run and participate in pilates without increased pain or symptoms. PLAN      [x]  Continue plan of care    [x]  Other:_re-eval next visit.       Kathi Piña, PT DPT, OCS 2/22/2022  75:67 PM       PT License #[id number]

## 2022-02-24 ENCOUNTER — HOSPITAL ENCOUNTER (OUTPATIENT)
Dept: PHYSICAL THERAPY | Age: 47
Discharge: HOME | End: 2022-02-24
Payer: OTHER GOVERNMENT

## 2022-02-24 PROCEDURE — 97140 MANUAL THERAPY 1/> REGIONS: CPT | Performed by: PHYSICAL THERAPIST

## 2022-02-24 PROCEDURE — 97110 THERAPEUTIC EXERCISES: CPT | Performed by: PHYSICAL THERAPIST

## 2022-02-24 NOTE — PROGRESS NOTES
PT Re-evaluation /  DAILY TREATMENT NOTE - [facility] 2-15    Patient Name: Clarence Ruth  Date:2022  : 1975  [x]  Patient  Verified  Payor:  / Plan: Shad Chowdary 74 / Product Type:  /    In time:   Out time: 155   Total Treatment Time (min): 55  Total Timed Codes (min): 55  1:1 Treatment Time [facility] only):55   Visit #: 9    Treatment Area: Leg pain, right [M79.604]    SUBJECTIVE  Pain Level (0-10 scale), subjective functional status/changes: Pt reports her heel pain is 0 to 2/10. Today it has been \"weird,\" it comes and goes. Her MRI is . Overall, she is feeling better since first visit but still having pain with walking and unable to run. She reports the taping has been helpful. Any medication changes, allergies to medications, adverse drug reactions, diagnosis change, or new procedure performed?: [x] No    [] Yes (see summary sheet for update) SEE ABOVE. OBJECTIVE      SL squat:  Pt shows dynamic valgus / unable to hold neutral LQ alignment (B/L). Strength: prone ham:  L 4-/5, R 5/5. Heel raise: able to perform x 10 B/L  Unilateral heel raise:  Pt unable to perform full height of heel raise on the R as compared to L, noting tightness in her calf. Balance:  Pt able to hold 20 \" in SLS on either LE but shows decreased stability on the R with inc. Duration. Palpation:  Inc. TTP R medial and lateral gastroc. Muscles, R medial and lateral hamstrings, R medial tubercle of calcaneus and R plantar aspect of her heel as compared to the L. Pt to ice at home min Modality:      []  Ice     []  Heat       Position/location:   -   Rationale: decrease pain to improve the patients ability to do functional activities   [x] Skin assessment post-treatment:  [x]intact []redness- no adverse reaction    []redness  adverse reaction:      40 min Therapeutic Exercise:  [x] See flow sheet :   short foot in DLS 10 \" x 5  Toe yoga in DLS x 5 cycles. Tandem stance:  Short foot / controlling pronation 30 \" x 4 (on 1/2 foam today)       Pre- gait drill :  Heel strike to toe off with cues for control of pronation x 20    Heel raise B/L with eccentric control (review)    Pron/sup. Seated on BAPS,     Pron/sup standing on rockerboard. Rationale: increase strength, improve coordination and increase proprioception to improve the patients ability to run, walk without pain. 15 min Manual Therapy:    Taping with cover roll and leuko tape to support fat pad       Rationale: decrease pain, increase tissue extensibility and decrease trigger points  to improve the patients ability to run, walk without pain. With   [] TE   [] TA   [] neuro   [] other: Patient Education: [x] Review HEP    [] Progressed/Changed HEP based on:   [] positioning   [] body mechanics   [] transfers   [] heat/ice application    [] other:      Pain Level (0-10 scale) post treatment: 0-2     ASSESSMENT/Changes in Function:   Pt noting pain in hamstring and heel is now intermittent versus constant. She is still experiencing heel pain with ambulation but in the past week or so it has been intermittent. She does continue with proximal weakness/instability in core and gluteals. She also shows weakness in L plantar flexors and increased TTP. Pt with MRI March 7th and would benefit from cont. PT to return to running and walking without pain. Patient will continue to benefit from skilled PT services to modify and progress therapeutic interventions, address strength deficits, analyze and address soft tissue restrictions, assess and modify postural abnormalities and instruct in home and community integration to attain remaining goals. Short Term Goals: To be accomplished in 2-3 weeks:              1) Pt independent in HEP from day 1 MET               2) Pt will report right hamstring tightness/pain is intermittent versus constant.  MET  3) Pt will report at rest or with walking she does not have right hamstring pain. progressing. Long Term Goals: To be accomplished in 4-8 weeks:              1) Pt independent in final HEP. 2) Pt will be able to run 30 minutes without right hamstring pain during or within 24 hours after. 3) Pt will be able to hike for 30 ' or more without right hamstring pain. 4) Pt will test 5/5 in prone with right hamstring without increased pain to demonstrate improved strength to be able to run and participate in pilates without increased pain or symptoms. PLAN      [x]  Continue plan of care    [x]  Other:_cont 2x/week for 4 weeks from 02/24.       Charlie Runner, PT DPT, OCS 2/24/2022  55:30 PM       PT License #[id number]

## 2022-03-01 ENCOUNTER — HOSPITAL ENCOUNTER (OUTPATIENT)
Dept: PHYSICAL THERAPY | Age: 47
Discharge: HOME | End: 2022-03-01
Payer: OTHER GOVERNMENT

## 2022-03-01 PROCEDURE — 97110 THERAPEUTIC EXERCISES: CPT | Performed by: PHYSICAL THERAPIST

## 2022-03-01 PROCEDURE — 97140 MANUAL THERAPY 1/> REGIONS: CPT | Performed by: PHYSICAL THERAPIST

## 2022-03-01 NOTE — PROGRESS NOTES
PT   DAILY TREATMENT NOTE - [facility] 2-15    Patient Name: Lulu Nicholas  Date:3/1/2022  : 1975  [x]  Patient  Verified  Payor:  / Plan: Shad Chowdary 74 / Product Type:  /    In time: 5456  Out time: 145   Total Treatment Time (min): 50  Total Timed Codes (min): 50  1:1 Treatment Time [facility] only):50   Visit #: 10    Treatment Area: Leg pain, right [M79.604]    SUBJECTIVE  Pain Level (0-10 scale), subjective functional status/changes: Pt reports her heel pain is 0 to 2/10. Overall, it is feeling better. Less pain with first step in the morning. The pain comes and goes and she is not always feeling pain with walking. She did a barre class this weekend with the tape on and that felt good. She did another class and no longer had the tape on and this was more painful. Any medication changes, allergies to medications, adverse drug reactions, diagnosis change, or new procedure performed?: [x] No    [] Yes (see summary sheet for update) SEE ABOVE. OBJECTIVE        Pt to ice at home min Modality:      []  Ice     []  Heat       Position/location:   -   Rationale: decrease pain to improve the patients ability to do functional activities   [x] Skin assessment post-treatment:  [x]intact []redness- no adverse reaction    []redness  adverse reaction:      40 min Therapeutic Exercise:  [x] See flow sheet :   short foot in DLS 10 \" x 5  Toe yoga in DLS x 5 cycles. HELD TODAY Tandem stance:  Short foot / controlling pronation 30 \" x 4 (on 1/2 foam today)       Pre- gait drill :  Heel strike to toe off with cues for control of pronation x 10    HELD today Heel raise B/L with eccentric control    Pron/sup. Seated on BAPS,     Pron/sup standing on rockerboard. M/L on the rockerboard x 2 minutes. Added back in glute activation exercise and standing hip abduction.       Rationale: increase strength, improve coordination and increase proprioception to improve the patients ability to run, walk without pain. 10 min Manual Therapy:    Taping with cover roll and leuko tape to support fat pad       Rationale: decrease pain, increase tissue extensibility and decrease trigger points  to improve the patients ability to run, walk without pain. With   [] TE   [] TA   [] neuro   [] other: Patient Education: [x] Review HEP    [] Progressed/Changed HEP based on:   [] positioning   [] body mechanics   [] transfers   [] heat/ice application    [] other:      Pain Level (0-10 scale) post treatment: no c/o of pain. ASSESSMENT/Changes in Function:   Pt able to progress back into standing hip strengthening and given cues for neutral foot position / short foot with glute activation and standing hip abduction. Patient will continue to benefit from skilled PT services to modify and progress therapeutic interventions, address strength deficits, analyze and address soft tissue restrictions, assess and modify postural abnormalities and instruct in home and community integration to attain remaining goals. Short Term Goals: To be accomplished in 2-3 weeks:              1) Pt independent in HEP from day 1 MET               2) Pt will report right hamstring tightness/pain is intermittent versus constant. MET  3) Pt will report at rest or with walking she does not have right hamstring pain. progressing. Long Term Goals: To be accomplished in 4-8 weeks:              1) Pt independent in final HEP. 2) Pt will be able to run 30 minutes without right hamstring pain during or within 24 hours after. 3) Pt will be able to hike for 30 ' or more without right hamstring pain. 4) Pt will test 5/5 in prone with right hamstring without increased pain to demonstrate improved strength to be able to run and participate in pilates without increased pain or symptoms. PLAN      [x]  Continue plan of care    [x]  Other:_cont 2x/week for 4 weeks from 02/24. Will Cortez, PT DPT, OCS 3/1/2022  27:79 PM       PT License #[id number]

## 2022-03-03 ENCOUNTER — HOSPITAL ENCOUNTER (OUTPATIENT)
Dept: PHYSICAL THERAPY | Age: 47
Discharge: HOME | End: 2022-03-03
Payer: OTHER GOVERNMENT

## 2022-03-03 PROCEDURE — 97140 MANUAL THERAPY 1/> REGIONS: CPT | Performed by: PHYSICAL THERAPIST

## 2022-03-03 PROCEDURE — 97110 THERAPEUTIC EXERCISES: CPT | Performed by: PHYSICAL THERAPIST

## 2022-03-03 NOTE — PROGRESS NOTES
PT   DAILY TREATMENT NOTE - [facility] 2-15    Patient Name: Christofer Ragland  Date:3/3/2022  : 1975  [x]  Patient  Verified  Payor:  / Plan: Shad Chodwary 74 / Product Type:  /    In time: 773  Out time: 400   Total Treatment Time (min): 55  Total Timed Codes (min): 55  1:1 Treatment Time [facility] only):55   Visit #: 11    Treatment Area: Leg pain, right [M79.604]    SUBJECTIVE  Pain Level (0-10 scale), subjective functional status/changes: Pt reports her heel pain is 0/10. Her hamstring is feeling tight. Any medication changes, allergies to medications, adverse drug reactions, diagnosis change, or new procedure performed?: [x] No    [] Yes (see summary sheet for update) SEE ABOVE. OBJECTIVE        Pt to ice at home min Modality:      []  Ice     []  Heat       Position/location:   -   Rationale: decrease pain to improve the patients ability to do functional activities   [x] Skin assessment post-treatment:  [x]intact []redness- no adverse reaction    []redness  adverse reaction:      45 min Therapeutic Exercise:  [x] See flow sheet :   Short foot and toe yoga seated     Tandem stance:  Short foot / controlling pronation 30 \" x 4 (on 1/2 foam today)       HELD TODAY Pre- gait drill :  Heel strike to toe off with cues for control of pronation x 10    HELD today Heel raise B/L with eccentric control    Pron/sup. Seated on BAPS level 3 then standing on baps. Pron/sup standing on rockerboard. M/L on the rockerboard x 2 minutes. glute activation exercise and standing hip abduction. Added side planks with LE on BOSU.  10\" x 3 R only. S/L hip ABD with foot on SWB. Rationale: increase strength, improve coordination and increase proprioception to improve the patients ability to run, walk without pain.      10 min Manual Therapy:    Taping with cover roll and leuko tape to support fat pad       Rationale: decrease pain, increase tissue extensibility and decrease trigger points  to improve the patients ability to run, walk without pain. With   [] TE   [] TA   [] neuro   [] other: Patient Education: [x] Review HEP    [] Progressed/Changed HEP based on:   [] positioning   [] body mechanics   [] transfers   [] heat/ice application    [] other:      Pain Level (0-10 scale) post treatment: 1/10 heel pain after exercises. ASSESSMENT/Changes in Function:   Pt with decreased symptoms overall, 0/10 pain while walking into clinic today. She has still been unable to run. Pt with MRI 03/07. Patient will continue to benefit from skilled PT services to modify and progress therapeutic interventions, address strength deficits, analyze and address soft tissue restrictions, assess and modify postural abnormalities and instruct in home and community integration to attain remaining goals. Short Term Goals: To be accomplished in 2-3 weeks:              1) Pt independent in HEP from day 1 MET               2) Pt will report right hamstring tightness/pain is intermittent versus constant. MET  3) Pt will report at rest or with walking she does not have right hamstring pain. progressing. Long Term Goals: To be accomplished in 4-8 weeks:              1) Pt independent in final HEP. 2) Pt will be able to run 30 minutes without right hamstring pain during or within 24 hours after. 3) Pt will be able to hike for 30 ' or more without right hamstring pain. 4) Pt will test 5/5 in prone with right hamstring without increased pain to demonstrate improved strength to be able to run and participate in pilates without increased pain or symptoms. PLAN      [x]  Continue plan of care    [x]  Other:_cont 2x/week for 4 weeks from 02/24.       Eliazar Fowler, PT DPT, OCS 3/3/2022  16:37 PM       PT License #[id number]

## 2022-03-08 ENCOUNTER — HOSPITAL ENCOUNTER (OUTPATIENT)
Dept: PHYSICAL THERAPY | Age: 47
Discharge: HOME | End: 2022-03-08
Payer: OTHER GOVERNMENT

## 2022-03-08 PROCEDURE — 97110 THERAPEUTIC EXERCISES: CPT | Performed by: PHYSICAL THERAPIST

## 2022-03-08 PROCEDURE — 97140 MANUAL THERAPY 1/> REGIONS: CPT | Performed by: PHYSICAL THERAPIST

## 2022-03-08 NOTE — PROGRESS NOTES
PT   DAILY TREATMENT NOTE - [facility] 2-15    Patient Name: Raheel Cox  Date:3/8/2022  : 1975  [x]  Patient  Verified  Payor:  / Plan: Shad Chowdary 75 / Product Type:  /    In time: 2980  Out time: 120   Total Treatment Time (min): 50  Total Timed Codes (min): 50  1:1 Treatment Time [facility] only):45   Visit #: 12    Treatment Area: Leg pain, right [M79.604]    SUBJECTIVE  Pain Level (0-10 scale), subjective functional status/changes: Pt reports her heel pain is 2-3/10. Her whole right leg is feeling tight and heel is hurting more today, unsure why. She had MRI and will have f/u with Dr. Kit Brito on Thursday. Any medication changes, allergies to medications, adverse drug reactions, diagnosis change, or new procedure performed?: [x] No    [] Yes (see summary sheet for update) SEE ABOVE. OBJECTIVE        Pt to ice at home min Modality:      []  Ice     []  Heat       Position/location:   -   Rationale: decrease pain to improve the patients ability to do functional activities   [x] Skin assessment post-treatment:  [x]intact []redness- no adverse reaction    []redness  adverse reaction:      40 min Therapeutic Exercise:  [x] See flow sheet :   Short foot and toe yoga seated     HELD TODAY Tandem stance:  Short foot / controlling pronation 30 \" x 4 (on 1/2 foam today)        Pre- gait drill :  Heel strike to toe off with cues for control of pronation x 20    HELD today Heel raise B/L with eccentric control    Pron/sup. Seated on BAPS level 3 then standing on baps. Pron/sup standing on rockerboard. M/L on the rockerboard x 2 minutes. glute activation exercise and standing hip abduction. A side planks with LE on BOSU. R only. S/L hip ABD with foot on SWB. Rationale: increase strength, improve coordination and increase proprioception to improve the patients ability to run, walk without pain.      10 min Manual Therapy:    Taping with cover roll and leuko tape to support fat pad       Rationale: decrease pain, increase tissue extensibility and decrease trigger points  to improve the patients ability to run, walk without pain. With   [] TE   [] TA   [] neuro   [] other: Patient Education: [x] Review HEP    [] Progressed/Changed HEP based on:   [] positioning   [] body mechanics   [] transfers   [] heat/ice application    [] other:      Pain Level (0-10 scale) post treatment:Pt reports heel is irritated today but no worse end of session today    ASSESSMENT/Changes in Function:   Pt with inc. Symptoms today, recent MRI and will f/u with Dr. Herberth Vallejo tomorrow. Patient will continue to benefit from skilled PT services to modify and progress therapeutic interventions, address strength deficits, analyze and address soft tissue restrictions, assess and modify postural abnormalities and instruct in home and community integration to attain remaining goals. Short Term Goals: To be accomplished in 2-3 weeks:              1) Pt independent in HEP from day 1 MET               2) Pt will report right hamstring tightness/pain is intermittent versus constant. MET  3) Pt will report at rest or with walking she does not have right hamstring pain. progressing. Long Term Goals: To be accomplished in 4-8 weeks:              1) Pt independent in final HEP. 2) Pt will be able to run 30 minutes without right hamstring pain during or within 24 hours after. 3) Pt will be able to hike for 30 ' or more without right hamstring pain. 4) Pt will test 5/5 in prone with right hamstring without increased pain to demonstrate improved strength to be able to run and participate in pilates without increased pain or symptoms. PLAN      [x]  Continue plan of care    [x]  Other:_cont 2x/week for 4 weeks from 02/24.       Margaret Malcolm, PT DPT, OCS 3/8/2022  45:90 PM       PT License #[id number]

## 2022-03-10 ENCOUNTER — HOSPITAL ENCOUNTER (OUTPATIENT)
Dept: PHYSICAL THERAPY | Age: 47
Discharge: HOME | End: 2022-03-10
Payer: OTHER GOVERNMENT

## 2022-03-10 PROCEDURE — 97110 THERAPEUTIC EXERCISES: CPT | Performed by: PHYSICAL THERAPIST

## 2022-03-10 PROCEDURE — 97140 MANUAL THERAPY 1/> REGIONS: CPT | Performed by: PHYSICAL THERAPIST

## 2022-03-10 NOTE — PROGRESS NOTES
PT   DAILY TREATMENT NOTE - [facility] 2-15    Patient Name: Elicia Gunter  Date:3/10/2022  : 1975  [x]  Patient  Verified  Payor:  / Plan: Jeannie Davila / Product Type:  /    In time: 3912  Out time: 120   Total Treatment Time (min): 50  Total Timed Codes (min): 50  1:1 Treatment Time ( only): 38  Visit #: 13    Treatment Area: Leg pain, right [M79.604]    SUBJECTIVE  Pain Level (0-10 scale), subjective functional status/changes: Pt reports her heel pain is 0/10. Pt reports she had tightness in her hamstring yesterday but today it feels good. Pt to f/u with Dr. Verneita Bamberger after this apointment. Any medication changes, allergies to medications, adverse drug reactions, diagnosis change, or new procedure performed?: [x] No    [] Yes (see summary sheet for update) SEE ABOVE. OBJECTIVE        Pt to ice at home min Modality:      []  Ice     []  Heat       Position/location:   -   Rationale: decrease pain to improve the patients ability to do functional activities   [x] Skin assessment post-treatment:  [x]intact []redness- no adverse reaction    []redness  adverse reaction:      40 min Therapeutic Exercise:  [x] See flow sheet :   Short foot and toe yoga standing. HELD TODAY Tandem stance:  Short foot / controlling pronation 30 \" x 4 (on 1/2 foam today)        Pre- gait drill :  Heel strike to toe off with cues for control of pronation x 20    HELD today Heel raise B/L with eccentric control    Pron/sup. Standing on BAPS level 3 then standing on baps. Pron/sup standing on rockerboard. M/L on the rockerboard x 2 minutes. glute activation exercise and standing hip abduction. side planks with LE on BOSU. R only. S/L hip ABD with foot on SWB. Rationale: increase strength, improve coordination and increase proprioception to improve the patients ability to run, walk without pain.      10 min Manual Therapy:    Taping with cover roll and leuko tape to support fat pad       Rationale: decrease pain, increase tissue extensibility and decrease trigger points  to improve the patients ability to run, walk without pain. With   [] TE   [] TA   [] neuro   [] other: Patient Education: [x] Review HEP    [] Progressed/Changed HEP based on:   [] positioning   [] body mechanics   [] transfers   [] heat/ice application    [] other:      Pain Level (0-10 scale) post treatment: pt reports 0/10 pain. ASSESSMENT/Changes in Function:     Pt with decreased symptoms throughout session today and challenged with progression in ther ex. Patient will continue to benefit from skilled PT services to modify and progress therapeutic interventions, address strength deficits, analyze and address soft tissue restrictions, assess and modify postural abnormalities and instruct in home and community integration to attain remaining goals. Short Term Goals: To be accomplished in 2-3 weeks:              1) Pt independent in HEP from day 1 MET               2) Pt will report right hamstring tightness/pain is intermittent versus constant. MET  3) Pt will report at rest or with walking she does not have right hamstring pain. progressing. Long Term Goals: To be accomplished in 4-8 weeks:              1) Pt independent in final HEP. 2) Pt will be able to run 30 minutes without right hamstring pain during or within 24 hours after. 3) Pt will be able to hike for 30 ' or more without right hamstring pain. 4) Pt will test 5/5 in prone with right hamstring without increased pain to demonstrate improved strength to be able to run and participate in pilates without increased pain or symptoms. PLAN      [x]  Continue plan of care    [x]  Other:_cont 2x/week for 4 weeks from 02/24.       Ramírez Rayo, PT DPT, OCS 3/10/2022  52:11 PM       PT License #[id number]

## 2022-03-15 ENCOUNTER — APPOINTMENT (OUTPATIENT)
Dept: PHYSICAL THERAPY | Age: 47
End: 2022-03-15
Payer: OTHER GOVERNMENT

## 2022-03-15 ENCOUNTER — HOSPITAL ENCOUNTER (OUTPATIENT)
Dept: PHYSICAL THERAPY | Age: 47
Discharge: HOME | End: 2022-03-15
Payer: OTHER GOVERNMENT

## 2022-03-15 PROCEDURE — 97140 MANUAL THERAPY 1/> REGIONS: CPT | Performed by: PHYSICAL THERAPIST

## 2022-03-15 PROCEDURE — 97110 THERAPEUTIC EXERCISES: CPT | Performed by: PHYSICAL THERAPIST

## 2022-03-15 NOTE — PROGRESS NOTES
PT   DAILY TREATMENT NOTE - [facility] 2-15    Patient Name: Pato Price  Date:3/15/2022  : 1975  [x]  Patient  Verified  Payor:  / Plan: Shad Chowdary 75 / Product Type:  /    In time:   Out time:  120  Total Treatment Time (min):50   Total Timed Codes (min): 50  1:1 Treatment Time [facility] only):50   Visit #: 14    Treatment Area: Leg pain, right [M79.604]    SUBJECTIVE  Pain Level (0-10 scale), subjective functional status/changes: Pt reports her heel pain is 0-1/10. Pt reports Dr. Cristina Malik just told her she has a case of plantar fasciitis. She is going to f/u in about 1 month. He said that they'll need to do something if she isn't able to run by then. Any medication changes, allergies to medications, adverse drug reactions, diagnosis change, or new procedure performed?: [x] No    [] Yes (see summary sheet for update) SEE ABOVE. OBJECTIVE        Pt to ice at home min Modality:      []  Ice     []  Heat       Position/location:   -   Rationale: decrease pain to improve the patients ability to do functional activities   [x] Skin assessment post-treatment:  [x]intact []redness- no adverse reaction    []redness  adverse reaction:      40 min Therapeutic Exercise:  [x] See flow sheet :   HELD Short foot and toe yoga standing. HELD TODAY Tandem stance:  Short foot / controlling pronation 30 \" x 4 (on 1/2 foam today)        Pre- gait drill :  Heel strike to toe off with cues for control of pronation x 20    Heel raise B/L with eccentric control 2 x10     Pron/sup. Standing on BAPS level 3     Pron/sup standing on rockerboard. M/L on the rockerboard x 2 minutes. glute activation exercise and standing hip abduction. side planks with LE on BOSU. R only. HOLD    S/L hip ABD with foot on SWB HOLD     Rationale: increase strength, improve coordination and increase proprioception to improve the patients ability to run, walk without pain.      10 min Manual Therapy: Taping with cover roll and leuko tape to support fat pad      STM / TPR to posterior tib. RIGHT   Rationale: decrease pain, increase tissue extensibility and decrease trigger points  to improve the patients ability to run, walk without pain. With   [] TE   [] TA   [] neuro   [] other: Patient Education: [x] Review HEP    [] Progressed/Changed HEP based on:   [] positioning   [] body mechanics   [] transfers   [] heat/ice application    [] other:      Pain Level (0-10 scale) post treatment: pt reports 0/10 pain. ASSESSMENT/Changes in Function:     Pt with decreased symptoms throughout session today. Patient will continue to benefit from skilled PT services to modify and progress therapeutic interventions, address strength deficits, analyze and address soft tissue restrictions, assess and modify postural abnormalities and instruct in home and community integration to attain remaining goals. Short Term Goals: To be accomplished in 2-3 weeks:              1) Pt independent in HEP from day 1 MET               2) Pt will report right hamstring tightness/pain is intermittent versus constant. MET  3) Pt will report at rest or with walking she does not have right hamstring pain. progressing. Long Term Goals: To be accomplished in 4-8 weeks:              1) Pt independent in final HEP. 2) Pt will be able to run 30 minutes without right hamstring pain during or within 24 hours after. 3) Pt will be able to hike for 30 ' or more without right hamstring pain. 4) Pt will test 5/5 in prone with right hamstring without increased pain to demonstrate improved strength to be able to run and participate in pilates without increased pain or symptoms. PLAN      [x]  Continue plan of care    [x]  Other:_cont 2x/week for 4 weeks from 02/24.       Merari Crowder, PT DPT, OCS 3/15/2022  71:91 PM       PT License #[id number]

## 2022-03-17 ENCOUNTER — APPOINTMENT (OUTPATIENT)
Dept: PHYSICAL THERAPY | Age: 47
End: 2022-03-17
Payer: OTHER GOVERNMENT

## 2022-03-18 ENCOUNTER — TRANSCRIBE ORDER (OUTPATIENT)
Dept: SCHEDULING | Age: 47
End: 2022-03-18

## 2022-03-18 DIAGNOSIS — J32.4 CHRONIC PANSINUSITIS: Primary | ICD-10-CM

## 2022-03-19 PROBLEM — Z91.09 ALLERGY TO ENVIRONMENTAL FACTORS: Status: ACTIVE | Noted: 2020-05-21

## 2022-03-19 PROBLEM — K51.90 ULCERATIVE COLITIS, CHRONIC (HCC): Status: ACTIVE | Noted: 2020-05-21

## 2022-03-19 PROBLEM — J45.990 ASTHMA, EXERCISE INDUCED: Status: ACTIVE | Noted: 2020-05-21

## 2022-03-22 ENCOUNTER — HOSPITAL ENCOUNTER (OUTPATIENT)
Dept: PHYSICAL THERAPY | Age: 47
Discharge: HOME | End: 2022-03-22
Payer: OTHER GOVERNMENT

## 2022-03-22 PROCEDURE — 97140 MANUAL THERAPY 1/> REGIONS: CPT | Performed by: PHYSICAL THERAPIST

## 2022-03-22 PROCEDURE — 97110 THERAPEUTIC EXERCISES: CPT | Performed by: PHYSICAL THERAPIST

## 2022-03-22 NOTE — PROGRESS NOTES
PT   DAILY TREATMENT NOTE - [facility] 2-15    Patient Name: Mary Lyn  Date:3/22/2022  : 1975  [x]  Patient  Verified  Payor:  / Plan: Shad Chowdary 74 / Product Type:  /    In time: 151  Out time: 500  Total Treatment Time (min):60   Total Timed Codes (min): 55  1:1 Treatment Time [facility] only):55   Visit #: 15    Treatment Area: Leg pain, right [M79.604]    SUBJECTIVE  Pain Level (0-10 scale), subjective functional status/changes: Pt reports her heel pain is 0/10. Pt reports she isn't having heel pain, just hamstring tightness. She has been able to walk 2 miles without heel pain. She did walk barefoot in the house and had inc. Pain after that. Any medication changes, allergies to medications, adverse drug reactions, diagnosis change, or new procedure performed?: [x] No    [] Yes (see summary sheet for update) SEE ABOVE. OBJECTIVE        Pt to ice at home min Modality:      []  Ice     []  Heat       Position/location:   -   Rationale: decrease pain to improve the patients ability to do functional activities   [x] Skin assessment post-treatment:  [x]intact []redness- no adverse reaction    []redness  adverse reaction:      45 min Therapeutic Exercise:  [x] See flow sheet :   HELD Short foot and toe yoga standing. HELD TODAY Tandem stance:  Short foot / controlling pronation 30 \" x 4 (on 1/2 foam today)        HELD today Pre- gait drill :  Heel strike to toe off with cues for control of pronation x 20    Heel raise B/L with eccentric control 2 x10     Pron/sup. Standing on BAPS level 3     Pron/sup standing on rockerboard. M/L on the rockerboard x 2 minutes. glute activation exercise and standing hip abduction. side planks with LE on BOSU. R only. S/L hip ABD with foot on SWB HOLD    Added agility ladder     Rationale: increase strength, improve coordination and increase proprioception to improve the patients ability to run, walk without pain.      10 min Manual Therapy:    Taping with cover roll and leuko tape to support fat pad      STM / TPR to posterior tib. RIGHT   Rationale: decrease pain, increase tissue extensibility and decrease trigger points  to improve the patients ability to run, walk without pain. With   [] TE   [] TA   [] neuro   [] other: Patient Education: [x] Review HEP    [] Progressed/Changed HEP based on:   [] positioning   [] body mechanics   [] transfers   [] heat/ice application    [] other:      Pain Level (0-10 scale) post treatment: pt reports 0/10 pain. ASSESSMENT/Changes in Function:     Pt able to perform agility ladder today without inc. Symptoms but will check in with pt next visit to see how she is feeling for the next 48 hr.  Pt will perform again on her own Thursday or Friday if she is symptom free. Patient will continue to benefit from skilled PT services to modify and progress therapeutic interventions, address strength deficits, analyze and address soft tissue restrictions, assess and modify postural abnormalities and instruct in home and community integration to attain remaining goals. Short Term Goals: To be accomplished in 2-3 weeks:              1) Pt independent in HEP from day 1 MET               2) Pt will report right hamstring tightness/pain is intermittent versus constant. MET  3) Pt will report at rest or with walking she does not have right hamstring pain. progressing. Long Term Goals: To be accomplished in 4-8 weeks:              1) Pt independent in final HEP. 2) Pt will be able to run 30 minutes without right hamstring pain during or within 24 hours after. 3) Pt will be able to hike for 30 ' or more without right hamstring pain. 4) Pt will test 5/5 in prone with right hamstring without increased pain to demonstrate improved strength to be able to run and participate in pilates without increased pain or symptoms.        PLAN      [x] Continue plan of care    [x]  Other:_cont 2x/week for 4 weeks from 02/24.       John Tatum, PT DPT, OCS 3/22/2022  26:15 PM       PT License #[id number]

## 2022-03-24 ENCOUNTER — APPOINTMENT (OUTPATIENT)
Dept: PHYSICAL THERAPY | Age: 47
End: 2022-03-24
Payer: OTHER GOVERNMENT

## 2022-03-29 ENCOUNTER — HOSPITAL ENCOUNTER (OUTPATIENT)
Dept: PHYSICAL THERAPY | Age: 47
Discharge: HOME | End: 2022-03-29
Payer: OTHER GOVERNMENT

## 2022-03-29 ENCOUNTER — HOSPITAL ENCOUNTER (OUTPATIENT)
Dept: CT IMAGING | Age: 47
Discharge: HOME | End: 2022-03-29
Payer: OTHER GOVERNMENT

## 2022-03-29 DIAGNOSIS — J32.4 CHRONIC PANSINUSITIS: ICD-10-CM

## 2022-03-29 PROCEDURE — 97110 THERAPEUTIC EXERCISES: CPT | Performed by: PHYSICAL THERAPIST

## 2022-03-29 PROCEDURE — 70486 CT MAXILLOFACIAL W/O DYE: CPT | Performed by: OTOLARYNGOLOGY

## 2022-03-29 PROCEDURE — 97140 MANUAL THERAPY 1/> REGIONS: CPT | Performed by: PHYSICAL THERAPIST

## 2022-03-29 NOTE — PROGRESS NOTES
PT   DAILY TREATMENT NOTE - [facility] 2-15    Patient Name: Inez Woodall  Date:3/29/2022  : 1975  [x]  Patient  Verified  Payor:  / Plan: Shad Chowdary 75 / Product Type:  /    In time: 494  Out time: 125  Total Treatment Time (min):45   Total Timed Codes (min): 45  1:1 Treatment Time [facility] only):45   Visit #: 16    Treatment Area: Leg pain, right [M11.604]    SUBJECTIVE  Pain Level (0-10 scale), subjective functional status/changes: Pt reports her heel pain is 0/10. She has had some discomfort on the outside of her right foot which began yesterday. She didn't have any heel pain after the agility exercises and was able to do them on Friday without heel pain during or after. She has continued to have hamstring tightness. Any medication changes, allergies to medications, adverse drug reactions, diagnosis change, or new procedure performed?: [x] No    [] Yes (see summary sheet for update) SEE ABOVE. OBJECTIVE      Plyometric jumps from ground: quad dominance noted (knees anterior to toes). Pt to ice at home min Modality:      []  Ice     []  Heat       Position/location:   -   Rationale: decrease pain to improve the patients ability to do functional activities   [x] Skin assessment post-treatment:  [x]intact []redness- no adverse reaction    []redness  adverse reaction:      35 min Therapeutic Exercise:  [x] See flow sheet :   HELD Short foot and toe yoga standing. HELD TODAY Tandem stance:  Short foot / controlling pronation 30 \" x 4 (on 1/2 foam today)        HELD today Pre- gait drill :  Heel strike to toe off with cues for control of pronation x 20    Heel raise B/L with eccentric control 2 x10     Pron/sup. Standing on BAPS level 3     Pron/sup standing on rockerboard. M/L on the rockerboard x 2 minutes. glute activation exercise and standing hip abduction (added on 1/2 foam)    side planks with LE on BOSU. R only.   HOLD     S/L hip ABD with foot on SWB HOLD    agility ladder x 3 laps each of 3 drills. Plyo jumps x 10. Rationale: increase strength, improve coordination and increase proprioception to improve the patients ability to run, walk without pain. 10 min Manual Therapy:    Taping with cover roll and leuko tape to support fat pad         Rationale: decrease pain, increase tissue extensibility and decrease trigger points  to improve the patients ability to run, walk without pain. With   [] TE   [] TA   [] neuro   [] other: Patient Education: [x] Review HEP    [] Progressed/Changed HEP based on:   [] positioning   [] body mechanics   [] transfers   [] heat/ice application    [] other:      Pain Level (0-10 scale) post treatment: pt reports 0/10 pain, 0/10 throughout session today. ASSESSMENT/Changes in Function:     Pt with decreased pain since last visit, was able to do agility ladder last visit and at home without increased symptoms. We also added plyometric jumps and pt was able to perform without inc. Pain although needed cues for her form. Patient will continue to benefit from skilled PT services to modify and progress therapeutic interventions, address strength deficits, analyze and address soft tissue restrictions, assess and modify postural abnormalities and instruct in home and community integration to attain remaining goals. Short Term Goals: To be accomplished in 2-3 weeks:              1) Pt independent in HEP from day 1 MET               2) Pt will report right hamstring tightness/pain is intermittent versus constant. MET  3) Pt will report at rest or with walking she does not have right hamstring pain. progressing. Long Term Goals: To be accomplished in 4-8 weeks:              1) Pt independent in final HEP. 2) Pt will be able to run 30 minutes without right hamstring pain during or within 24 hours after.                 3) Pt will be able to hike for 30 ' or more without right hamstring pain. 4) Pt will test 5/5 in prone with right hamstring without increased pain to demonstrate improved strength to be able to run and participate in pilates without increased pain or symptoms. PLAN      [x]  Continue plan of care    [x]  Other:_cont 2x/week for 4 weeks from 02/24  - re-eval next visit.       Samson Price, PT DPT, OCS 3/29/2022  24:12 PM       PT License #[id number]

## 2022-03-31 ENCOUNTER — HOSPITAL ENCOUNTER (OUTPATIENT)
Dept: PHYSICAL THERAPY | Age: 47
Discharge: HOME | End: 2022-03-31
Payer: OTHER GOVERNMENT

## 2022-03-31 PROCEDURE — 97110 THERAPEUTIC EXERCISES: CPT | Performed by: PHYSICAL THERAPIST

## 2022-03-31 PROCEDURE — 97140 MANUAL THERAPY 1/> REGIONS: CPT | Performed by: PHYSICAL THERAPIST

## 2022-03-31 NOTE — PROGRESS NOTES
PT Re-evaluation / DAILY TREATMENT NOTE - [facility] 2-15    Patient Name: Luther Martel  Date:3/31/2022  : 1975  [x]  Patient  Verified  Payor:  / Plan: Shad Chowdary 75 / Product Type:  /    In time: 6584  Out time: 125  Total Treatment Time (min):50   Total Timed Codes (min): 50  1:1 Treatment Time [facility] only):50   Visit #: 17    Treatment Area: Leg pain, right [M79.604]    SUBJECTIVE  Pain Level (0-10 scale), subjective functional status/changes: Pt reports her heel pain is 0/10. Pt reports she has cont. To have hamstring tightness, she has been able to walk 2 miles without increased pain. She will be going on vacation for spring break next week to [place] and will likely will be doing a lot of walking. Any medication changes, allergies to medications, adverse drug reactions, diagnosis change, or new procedure performed?: [x] No    [] Yes (see summary sheet for update) SEE ABOVE. OBJECTIVE     SL squat:  Pt able to perform single limb squat on the right without femur ADD / IR x 10 reps without pain.       Strength: prone ham:  L 5/5, R 5/5. Unilateral heel raise:  Pt able to perform full height of heel raise on the R without pain. She was able to perform 10 reps. Balance:  Pt able to hold 30 \" in SLS with good stability. Pt to ice at home min Modality:      []  Ice     []  Heat       Position/location:   -   Rationale: decrease pain to improve the patients ability to do functional activities   [x] Skin assessment post-treatment:  [x]intact []redness- no adverse reaction    []redness  adverse reaction:      40 min Therapeutic Exercise:  [x] See flow sheet :   HELD Short foot and toe yoga standing. HELD TODAY Tandem stance:  Short foot / controlling pronation 30 \" x 4 (on 1/2 foam today)        HELD today Pre- gait drill :  Heel strike to toe off with cues for control of pronation x 20    Heel raise B/L with eccentric control 2 x10     HELD today. Pron/sup. Standing on BAPS level 3     Pron/sup standing on rockerboard. M/L on the rockerboard x 2 minutes. glute activation exercise and standing hip abduction ( on 1/2 foam)    side planks with LE on BOSU. R only. HOLD     S/L hip ABD with foot on SWB HOLD    agility ladder x 3 laps each of 3 drills. Plyo jumps  2x 10. Added hip hinge with walking stick 2x/10. Rationale: increase strength, improve coordination and increase proprioception to improve the patients ability to run, walk without pain. 10 min Manual Therapy:    Taping with cover roll and leuko tape to support fat pad         Rationale: decrease pain, increase tissue extensibility and decrease trigger points  to improve the patients ability to run, walk without pain. With   [] TE   [] TA   [] neuro   [] other: Patient Education: [x] Review HEP    [] Progressed/Changed HEP based on:   [] positioning   [] body mechanics   [] transfers   [] heat/ice application    [] other:      Pain Level (0-10 scale) post treatment: pt reports 0/10 pain, 0/10 throughout session today. ASSESSMENT/Changes in Function:      Pt with 17 skilled PT sessions beginning 01/18 through 03/31. Pt shows decreased severity of pain and in last week has not c/o of heel pain. She has been able to walk 2 miles without pain and has been progressing in agility and plyometric exercises without symptoms. Pt main goal is to return to running. Pt is traveling to [place] for vacation and due inc. Walking planned for this trip so we will plan on trial with running program when she returns. Patient will continue to benefit from skilled PT services to modify and progress therapeutic interventions, address strength deficits, analyze and address soft tissue restrictions, assess and modify postural abnormalities and instruct in home and community integration to attain remaining goals. Short Term Goals:  To be accomplished in 2-3 weeks: 1) Pt independent in HEP from day 1 MET               2) Pt will report right hamstring tightness/pain is intermittent versus constant. MET  3) Pt will report at rest or with walking she does not have right hamstring pain. MET - tightness only. Long Term Goals: To be accomplished in 4-8 weeks:              1) Pt independent in final HEP. Progressing. 2) Pt will be able to run 30 minutes without right hamstring pain during or within 24 hours after. progressing. 3) Pt will be able to hike for 30 ' or more without right hamstring pain. progressing (pt can walk 2 miles)               4) Pt will test 5/5 in prone with right hamstring without increased pain to demonstrate improved strength to be able to run and participate in pilates without increased pain or symptoms.  progressing (pt with 5/5 hamstring strength)    PLAN      [x]  Continue plan of care    [x]  Other:_cont 2x/week for 4 weeks from 03/31 re-eval.      Cyndy Geronimo, PT DPT, OCS 3/31/2022  81:27 PM       PT License #[id number]

## 2022-04-12 ENCOUNTER — HOSPITAL ENCOUNTER (OUTPATIENT)
Dept: PHYSICAL THERAPY | Age: 47
Discharge: HOME | End: 2022-04-12
Payer: OTHER GOVERNMENT

## 2022-04-12 PROCEDURE — 97140 MANUAL THERAPY 1/> REGIONS: CPT | Performed by: PHYSICAL THERAPIST

## 2022-04-12 PROCEDURE — 97110 THERAPEUTIC EXERCISES: CPT | Performed by: PHYSICAL THERAPIST

## 2022-04-12 NOTE — PROGRESS NOTES
PT Re-evaluation / DAILY TREATMENT NOTE - [facility] 2-15    Patient Name: Reny Huffman  Date:2022  : 1975  [x]  Patient  Verified  Payor:  / Plan: Shad Chowdary 75 / Product Type:  /    In time: 717  Out time: 120  Total Treatment Time (min):45   Total Timed Codes (min): 45  1:1 Treatment Time [facility] only):45   Visit #: 18    Treatment Area: Leg pain, right [M79.604]    SUBJECTIVE  Pain Level (0-10 scale), subjective functional status/changes: Pt reports her heel pain is 0/10. Pt reports she felt like her foot felt good overall but felt like her foot was really being pulled and stretched after walking 8 hr at [employer]. She walked the day before for 5 hours. She did run this weekend. She did a 3 mile run/walk. She did it Saturday and . She reports it's been fine. Any medication changes, allergies to medications, adverse drug reactions, diagnosis change, or new procedure performed?: [x] No    [] Yes (see summary sheet for update) SEE ABOVE. OBJECTIVE          Pt to ice at home min Modality:      []  Ice     []  Heat       Position/location:   -   Rationale: decrease pain to improve the patients ability to do functional activities   [x] Skin assessment post-treatment:  [x]intact []redness- no adverse reaction    []redness  adverse reaction:      35 min Therapeutic Exercise:  [x] See flow sheet :   HELD Short foot and toe yoga standing. HELD TODAY Tandem stance:  Short foot / controlling pronation 30 \" x 4 (on 1/2 foam today)        HELD today Pre- gait drill :  Heel strike to toe off with cues for control of pronation x 20    HELD today Heel raise B/L with eccentric control 2 x10     HELD today. Pron/sup. Standing on BAPS level 3     Pron/sup standing on rockerboard. M/L on the rockerboard x 2 minutes. glute activation exercise and standing hip abduction ( on 1/2 foam)    side planks with LE on BOSU. R only.   HOLD     S/L hip ABD with foot on SWB HOLD    agility ladder x 3 laps each of 3 drills. Plyo jumps  x 10.     hip hinge with walking stick 2x/10. Discussed run program.     Rationale: increase strength, improve coordination and increase proprioception to improve the patients ability to run, walk without pain. 10 min Manual Therapy:    Taping with cover roll and leuko tape to support fat pad         Rationale: decrease pain, increase tissue extensibility and decrease trigger points  to improve the patients ability to run, walk without pain. With   [] TE   [] TA   [] neuro   [] other: Patient Education: [x] Review HEP    [] Progressed/Changed HEP based on:   [] positioning   [] body mechanics   [] transfers   [] heat/ice application    [] other:      Pain Level (0-10 scale) post treatment: pt reports 0/10 pain, 0/10 throughout session today. ASSESSMENT/Changes in Function:      Pt did well with walking at [facility] / [place] as noted above. She also initiated run program.  We discussed OK to do 1/4 run on and 1/4 walk for 2-3 miles but not consecutive days yet (and only 2 days/week). Patient will continue to benefit from skilled PT services to modify and progress therapeutic interventions, address strength deficits, analyze and address soft tissue restrictions, assess and modify postural abnormalities and instruct in home and community integration to attain remaining goals. Short Term Goals: To be accomplished in 2-3 weeks:              1) Pt independent in HEP from day 1 MET               2) Pt will report right hamstring tightness/pain is intermittent versus constant. MET  3) Pt will report at rest or with walking she does not have right hamstring pain. MET - tightness only. Long Term Goals: To be accomplished in 4-8 weeks:              1) Pt independent in final HEP. Progressing. 2) Pt will be able to run 30 minutes without right hamstring pain during or within 24 hours after. progressing. 3) Pt will be able to hike for 30 ' or more without right hamstring pain. progressing (pt can walk 2 miles)               4) Pt will test 5/5 in prone with right hamstring without increased pain to demonstrate improved strength to be able to run and participate in pilates without increased pain or symptoms.  progressing (pt with 5/5 hamstring strength)    PLAN      [x]  Continue plan of care    [x]  Other:_cont 2x/week for 4 weeks from 03/31 re-eval.      Taylor Vo, PT DPT, OCS 4/12/2022  16:11 PM       PT License #[id number]

## 2022-04-14 ENCOUNTER — HOSPITAL ENCOUNTER (OUTPATIENT)
Dept: PHYSICAL THERAPY | Age: 47
Discharge: HOME | End: 2022-04-14
Payer: OTHER GOVERNMENT

## 2022-04-14 PROCEDURE — 97140 MANUAL THERAPY 1/> REGIONS: CPT | Performed by: PHYSICAL THERAPIST

## 2022-04-14 PROCEDURE — 97110 THERAPEUTIC EXERCISES: CPT | Performed by: PHYSICAL THERAPIST

## 2022-04-14 NOTE — PROGRESS NOTES
PT  DAILY TREATMENT NOTE - [facility] 2-15    Patient Name: Franco Apley  Date:2022  : 1975  [x]  Patient  Verified  Payor:  / Plan: Shad Chowdary 75 / Product Type:  /    In time: 85  Out time: 120   Total Treatment Time (min):40   Total Timed Codes (min): 40  1:1 Treatment Time [facility] only):40    Visit #: 19    Treatment Area: Leg pain, right [M79.604]    SUBJECTIVE  Pain Level (0-10 scale), subjective functional status/changes: Pt reports her heel pain is 0/10. Pt reports she was able to run yesterday and did not have any pain. She ran 3 miles, 1/4 run and 1/4 walk or more. Any medication changes, allergies to medications, adverse drug reactions, diagnosis change, or new procedure performed?: [x] No    [] Yes (see summary sheet for update) SEE ABOVE. OBJECTIVE          Pt to ice at home min Modality:      []  Ice     []  Heat       Position/location:   -   Rationale: decrease pain to improve the patients ability to do functional activities   [x] Skin assessment post-treatment:  [x]intact []redness- no adverse reaction    []redness  adverse reaction:      30 min Therapeutic Exercise:  [x] See flow sheet :      HELD today Heel raise B/L with eccentric control 2 x10     Pron/sup. Standing on BAPS level 3     Pron/sup standing on rockerboard. M/L on the rockerboard x 2 minutes. glute activation exercise and standing hip abduction, standing hip extension ( on 1/2 foam)     side planks with LE on BOSU. R only. HOLD     S/L hip ABD with foot on SWB HOLD     agility ladder x 3 laps each of 3 drills. HOLD today Plyo jumps  x 10.     hip hinge with walking stick 2x/10. Added rebounder standing on level ground and standing on 1/2 foam.      Discussed run program.     Rationale: increase strength, improve coordination and increase proprioception to improve the patients ability to run, walk without pain.      10 min Manual Therapy:    Taping with cover roll and leuko tape to support fat pad         Rationale: decrease pain, increase tissue extensibility and decrease trigger points  to improve the patients ability to run, walk without pain. With   [] TE   [] TA   [] neuro   [] other: Patient Education: [x] Review HEP    [] Progressed/Changed HEP based on:   [] positioning   [] body mechanics   [] transfers   [] heat/ice application    [] other:      Pain Level (0-10 scale) post treatment: pt reports 0/10 pain, 0/10 throughout session today. ASSESSMENT/Changes in Function:   Pt able to perform run/walk yesterday without inc. Symptoms. She does report inc. Fatigue in achilles/ankle. Pt encouraged to wear supportive footwear. Patient will continue to benefit from skilled PT services to modify and progress therapeutic interventions, address strength deficits, analyze and address soft tissue restrictions, assess and modify postural abnormalities and instruct in home and community integration to attain remaining goals. Short Term Goals: To be accomplished in 2-3 weeks:              1) Pt independent in HEP from day 1 MET               2) Pt will report right hamstring tightness/pain is intermittent versus constant. MET  3) Pt will report at rest or with walking she does not have right hamstring pain. MET - tightness only. Long Term Goals: To be accomplished in 4-8 weeks:              1) Pt independent in final HEP. Progressing. 2) Pt will be able to run 30 minutes without right hamstring pain during or within 24 hours after. progressing. 3) Pt will be able to hike for 30 ' or more without right hamstring pain. progressing (pt can walk 2 miles)               4) Pt will test 5/5 in prone with right hamstring without increased pain to demonstrate improved strength to be able to run and participate in pilates without increased pain or symptoms.  progressing (pt with 5/5 hamstring strength)    PLAN      [x]  Continue plan of care    [x]  Other:_cont 2x/week for 4 weeks from 03/31 re-eval.      Zak Mcgee, PT DPT, OCS 4/14/2022  79:77 PM       PT License #[id number]

## 2022-04-19 ENCOUNTER — APPOINTMENT (OUTPATIENT)
Dept: PHYSICAL THERAPY | Age: 47
End: 2022-04-19
Payer: OTHER GOVERNMENT

## 2022-04-26 ENCOUNTER — HOSPITAL ENCOUNTER (OUTPATIENT)
Dept: PHYSICAL THERAPY | Age: 47
Discharge: HOME | End: 2022-04-26
Payer: OTHER GOVERNMENT

## 2022-04-26 PROCEDURE — 97110 THERAPEUTIC EXERCISES: CPT | Performed by: PHYSICAL THERAPIST

## 2022-04-26 NOTE — PROGRESS NOTES
PT  DAILY TREATMENT NOTE - [facility] 2-15    Patient Name: Eduardo Multani  Date:2022  : 1975  [x]  Patient  Verified  Payor:  / Plan: Shad Chowdary 75 / Product Type:  /    In time: 525  Out time: 620   Total Treatment Time (min):55   Total Timed Codes (min): 55  1:1 Treatment Time ([facility] only): 45   Visit #: 20    Treatment Area: Leg pain, right [M99.604]    SUBJECTIVE  Pain Level (0-10 scale), subjective functional status/changes: Pt reports her heel pain is 0/10. Pt reports she has been able to walk/run, she did the 10  and was going to walk but ended up running about 3 miles total (0.5 mile at a time). Any medication changes, allergies to medications, adverse drug reactions, diagnosis change, or new procedure performed?: [x] No    [] Yes (see summary sheet for update) SEE ABOVE. OBJECTIVE     Palpation:  No TTP right medial tubercle of calcaneous, R plantar aspect of heel, R soleus or gastroc. Mild TTP right biceps femoris muscle. Pt to ice at home min Modality:      []  Ice     []  Heat       Position/location:   -   Rationale: decrease pain to improve the patients ability to do functional activities   [x] Skin assessment post-treatment:  [x]intact []redness- no adverse reaction    []redness  adverse reaction:      55 min Therapeutic Exercise:  [x] See flow sheet :      HELD today Heel raise B/L with eccentric control 2 x10     Pron/sup. Standing on BAPS level 3     Pron/sup standing on rockerboard. M/L on the rockerboard x 2 minutes. glute activation exercise and standing hip abduction, standing hip extension ( on 1/2 foam)     side planks with LE on BOSU. R only. HOLD     S/L hip ABD with foot on SWB HOLD     agility ladder x 3 laps each of 3 drills.     HOLD today Plyo jumps  x 10.     hip hinge with FR 2x/10.        rebounder standing on level ground and standing on 1/2 foam.      Discussed run program.    Added [employer] foam walk with hip hinge to put cones on floor, 4 cones 3 reps. Hamstring curl with swiss ball bridge added. Rationale: increase strength, improve coordination and increase proprioception to improve the patients ability to run, walk without pain. not today min Manual Therapy:    Taping with cover roll and leuko tape to support fat pad         Rationale: decrease pain, increase tissue extensibility and decrease trigger points  to improve the patients ability to run, walk without pain. With   [] TE   [] TA   [] neuro   [] other: Patient Education: [x] Review HEP    [] Progressed/Changed HEP based on:   [] positioning   [] body mechanics   [] transfers   [] heat/ice application    [] other:      Pain Level (0-10 scale) post treatment: pt reports 0/10 pain, 0/10 throughout session today. ASSESSMENT/Changes in Function:   Pt able to participate in 10K this weekend (she estimates she ran 3 miles total) without increased pain. Patient will continue to benefit from skilled PT services to modify and progress therapeutic interventions, address strength deficits, analyze and address soft tissue restrictions, assess and modify postural abnormalities and instruct in home and community integration to attain remaining goals. Short Term Goals: To be accomplished in 2-3 weeks:              1) Pt independent in HEP from day 1 MET               2) Pt will report right hamstring tightness/pain is intermittent versus constant. MET  3) Pt will report at rest or with walking she does not have right hamstring pain. MET - tightness only. Long Term Goals: To be accomplished in 4-8 weeks:              1) Pt independent in final HEP. Progressing. 2) Pt will be able to run 30 minutes without right hamstring pain during or within 24 hours after. progressing. 3) Pt will be able to hike for 30 ' or more without right hamstring pain.  progressing (pt can walk 2 miles)               4) Pt will test 5/5 in prone with right hamstring without increased pain to demonstrate improved strength to be able to run and participate in pilates without increased pain or symptoms.  progressing (pt with 5/5 hamstring strength)    PLAN      [x]  Continue plan of care    [x]  Other:_cont 2x/week for 4 weeks from 03/31 re-eval.      Darci Zuñiga, PT DPT, OCS 4/26/2022  78:56 PM       PT License #[id number]

## 2022-04-28 ENCOUNTER — APPOINTMENT (OUTPATIENT)
Dept: PHYSICAL THERAPY | Age: 47
End: 2022-04-28
Payer: OTHER GOVERNMENT

## 2022-05-03 ENCOUNTER — HOSPITAL ENCOUNTER (OUTPATIENT)
Dept: PHYSICAL THERAPY | Age: 47
Discharge: HOME | End: 2022-05-03
Payer: OTHER GOVERNMENT

## 2022-05-03 PROCEDURE — 97140 MANUAL THERAPY 1/> REGIONS: CPT | Performed by: PHYSICAL THERAPIST

## 2022-05-03 PROCEDURE — 97110 THERAPEUTIC EXERCISES: CPT | Performed by: PHYSICAL THERAPIST

## 2022-05-03 NOTE — PROGRESS NOTES
PT Re-eval /  DAILY TREATMENT NOTE - [facility] 2-15    Patient Name: Yue Card  Date:5/3/2022  : 1975  [x]  Patient  Verified  Payor:  / Plan: Shad Chowdary 74 / Product Type:  /    In time: 4559  Out time: 115    Total Treatment Time (min):50   Total Timed Codes (min): 50  1:1 Treatment Time ( [address] only): 50   Visit #: 21    Treatment Area: Leg pain, right [M79.604]    SUBJECTIVE  Pain Level (0-10 scale), subjective functional status/changes: Pt reports her heel pain is 0/10. She has been able to run 2x/week (Wed and Sat) and has run 0.7 miles at a time and up to 2 miles total run/walk. She has had intermittent right hamstring tightness and it is difficult to tell agg. Factors as it comes and goes randomly. Any medication changes, allergies to medications, adverse drug reactions, diagnosis change, or new procedure performed?: [x] No    [] Yes (see summary sheet for update) SEE ABOVE. OBJECTIVE     SL squat:  Pt able to perform single limb squat on the right with mild femur ADD / IR x 10 reps without pain.       Strength: prone ham:  L 5/5, R 5/5.       Hip EXT:  L 5/5, R 5/5. Glute med:  R 4+/5.      Unilateral heel raise:  Pt able to perform full height of heel raise on the R without pain. She was able to perform 20 reps but fatigue at rep 15 reps and had hamstring tightness. Able to perform x 10 heel raise on the L without difficulty.        Balance:  Pt able to hold 30 \" in SLS with good stability. Palpation:  No TTP right medial tubercle of calcaneous, R plantar aspect of heel, , no TTP R biceps femoris.       Pt to ice at home min Modality:      []  Ice     []  Heat       Position/location:   -   Rationale: decrease pain to improve the patients ability to do functional activities   [x] Skin assessment post-treatment:  [x]intact []redness- no adverse reaction    []redness  adverse reaction:      40 min Therapeutic Exercise:  [x] See flow sheet :      HELD today Heel raise B/L with eccentric control 2 x10     Not today Pron/sup. Standing on BAPS level 3     Not today Pron/sup standing on rockerboard. Not today M/L on the rockerboard x 2 minutes. glute activation exercise and standing hip abduction, standing hip extension ( on 1/2 foam), cues to inc. Speed. Plyo jumps 2 x 10.     hip hinge with walking stick 2x/10. Added glute med. Posterior medial and lateral heel taps. not today rebounder standing on level ground and standing on 1/2 foam.      Not today  AIREX foam walk with hip hinge to put cones on floor, 4 cones 3 reps. Not today Hamstring curl with swiss ball bridge    Rationale: increase strength, improve coordination and increase proprioception to improve the patients ability to run, walk without pain. 10 min Manual Therapy:    Taping with cover roll and leuko tape to support fat pad         Rationale: decrease pain, increase tissue extensibility and decrease trigger points  to improve the patients ability to run, walk without pain. With   [] TE   [] TA   [] neuro   [] other: Patient Education: [x] Review HEP    [] Progressed/Changed HEP based on:   [] positioning   [] body mechanics   [] transfers   [] heat/ice application    [] other:      Pain Level (0-10 scale) post treatment: pt reports 0/10 pain, 0/10 throughout session today. ASSESSMENT/Changes in Function:   Pt with 21 skilled PT sessions. Pt with excellent progress, progressing back to running and now able to run 0.7 miles without pain and run off and on for 2 miles without pain. She cont. To have intermittent hamstring tightness. She cont. To have weakness in right PF's and right glute med.        Patient will continue to benefit from skilled PT services to modify and progress therapeutic interventions, address strength deficits, analyze and address soft tissue restrictions, assess and modify postural abnormalities and instruct in home and community integration to attain remaining goals. Short Term Goals: To be accomplished in 2-3 weeks:              1) Pt independent in HEP from day 1 MET               2) Pt will report right hamstring tightness/pain is intermittent versus constant. MET  3) Pt will report at rest or with walking she does not have right hamstring pain. MET - tightness only. Long Term Goals: To be accomplished in 4-8 weeks:              1) Pt independent in final HEP. Progressing. 2) Pt will be able to run 30 minutes without right hamstring pain during or within 24 hours after. progressing. 3) Pt will be able to hike for 30 ' or more without right hamstring pain. MET               4) Pt will test 5/5 in prone with right hamstring without increased pain to demonstrate improved strength to be able to run and participate in pilates without increased pain or symptoms.  MET     PLAN      [x]  Continue plan of care    [x]  Other:_cont 2x/week for 4 weeks from 05/03  re-eval.      Leigha Serrano, PT DPT, [facility] 5/3/2022  78:85 PM       PT License #[id number]

## 2022-05-05 ENCOUNTER — APPOINTMENT (OUTPATIENT)
Dept: PHYSICAL THERAPY | Age: 47
End: 2022-05-05
Payer: OTHER GOVERNMENT

## 2022-05-10 ENCOUNTER — HOSPITAL ENCOUNTER (OUTPATIENT)
Dept: PHYSICAL THERAPY | Age: 47
Discharge: HOME | End: 2022-05-10
Payer: OTHER GOVERNMENT

## 2022-05-10 PROCEDURE — 97110 THERAPEUTIC EXERCISES: CPT | Performed by: PHYSICAL THERAPIST

## 2022-05-10 NOTE — PROGRESS NOTES
PT DAILY TREATMENT NOTE - [facility] 2-15    Patient Name: Rachel Ly  Date:5/10/2022  : 1975  [x]  Patient  Verified  Payor:  / Plan: Oz Almeidaking / Product Type:  /    In time: 520  Out time: 600    Total Treatment Time (min):40   Total Timed Codes (min): 40  1:1 Treatment Time [facility] only):40    Visit #: 22    Treatment Area: Leg pain, right [M79.604]    SUBJECTIVE  Pain Level (0-10 scale), subjective functional status/changes: Pt reports her heel pain is 0/10. She still has tightness on and off in her hamstring. Any medication changes, allergies to medications, adverse drug reactions, diagnosis change, or new procedure performed?: [x] No    [] Yes (see summary sheet for update) SEE ABOVE. OBJECTIVE           Pt to ice at home min Modality:      []  Ice     []  Heat       Position/location:   -   Rationale: decrease pain to improve the patients ability to do functional activities   [x] Skin assessment post-treatment:  [x]intact []redness- no adverse reaction    []redness  adverse reaction:      40 min Therapeutic Exercise:  [x] See flow sheet :      HELD today Heel raise B/L with eccentric control 2 x10     Not today Pron/sup. Standing on BAPS level 3     Not today Pron/sup standing on rockerboard. Not today M/L on the rockerboard x 2 minutes. glute activation exercise and standing hip abduction, standing hip extension ( on 1/2 foam), cues to inc. Speed. Plyo jumps 2 x 10. HOLD today hip hinge with walking stick 2x/10.        glute med. Posterior medial and lateral heel taps with slider. rebounder standing on level ground and standing on 1/2 foam.      Not today  AIREX foam walk with hip hinge to put cones on floor, 4 cones 3 reps. Hamstring curl with swiss ball bridge    Rationale: increase strength, improve coordination and increase proprioception to improve the patients ability to run, walk without pain.      - min Manual Therapy: Taping with cover roll and leuko tape to support fat pad         Rationale: decrease pain, increase tissue extensibility and decrease trigger points  to improve the patients ability to run, walk without pain. With   [] TE   [] TA   [] neuro   [] other: Patient Education: [x] Review HEP    [] Progressed/Changed HEP based on:   [] positioning   [] body mechanics   [] transfers   [] heat/ice application    [] other:      Pain Level (0-10 scale) post treatment: pt reports 0/10 pain, 0/10 throughout session today. ASSESSMENT/Changes in Function:   Pt did well without inc. Pain in session today. Pt working towards indep. HEP. Patient will continue to benefit from skilled PT services to modify and progress therapeutic interventions, address strength deficits, analyze and address soft tissue restrictions, assess and modify postural abnormalities and instruct in home and community integration to attain remaining goals. Short Term Goals: To be accomplished in 2-3 weeks:              1) Pt independent in HEP from day 1 MET               2) Pt will report right hamstring tightness/pain is intermittent versus constant. MET  3) Pt will report at rest or with walking she does not have right hamstring pain. MET - tightness only. Long Term Goals: To be accomplished in 4-8 weeks:              1) Pt independent in final HEP. Progressing. 2) Pt will be able to run 30 minutes without right hamstring pain during or within 24 hours after. progressing. 3) Pt will be able to hike for 30 ' or more without right hamstring pain. MET               4) Pt will test 5/5 in prone with right hamstring without increased pain to demonstrate improved strength to be able to run and participate in pilates without increased pain or symptoms.  MET     PLAN      [x]  Continue plan of care    [x]  Other:_cont 2x/week for 4 weeks from 05/03  re-eval.      Taylor Vo, PT DPT, [facility] 5/10/2022  04:39 PM       PT License #[id number]

## 2022-05-19 ENCOUNTER — APPOINTMENT (OUTPATIENT)
Dept: PHYSICAL THERAPY | Age: 47
End: 2022-05-19
Payer: OTHER GOVERNMENT

## 2022-05-24 ENCOUNTER — HOSPITAL ENCOUNTER (OUTPATIENT)
Dept: PHYSICAL THERAPY | Age: 47
Discharge: HOME | End: 2022-05-24
Payer: OTHER GOVERNMENT

## 2022-05-24 PROCEDURE — 97110 THERAPEUTIC EXERCISES: CPT | Performed by: PHYSICAL THERAPIST

## 2022-05-24 PROCEDURE — 97140 MANUAL THERAPY 1/> REGIONS: CPT | Performed by: PHYSICAL THERAPIST

## 2022-05-24 NOTE — PROGRESS NOTES
PT DAILY TREATMENT NOTE - [facility] 2-15    Patient Name: Sara Fish  Date:2022  : 1975  [x]  Patient  Verified  Payor:  / Plan: Shad Chowdary 75 / Product Type:  /    In time: 965  Out time: 115     Total Treatment Time (min):45   Total Timed Codes (min): 45  1:1 Treatment Time [facility] only):45    Visit #: 23    Treatment Area: Leg pain, right [M79.604]    SUBJECTIVE  Pain Level (0-10 scale), subjective functional status/changes: Pt reports her heel pain is 0/10. She still has tightness on and off in her hamstring, \"something is just not right. \"       Any medication changes, allergies to medications, adverse drug reactions, diagnosis change, or new procedure performed?: [x] No    [] Yes (see summary sheet for update) SEE ABOVE. OBJECTIVE     Prone hip ext with knee flexion:  Tests 5/5 L, 4+/5 R. Pt notes feels much easier on the left as compared to right. Palpation:  TTP mid biceps femoris RIGHT       Pt to ice at home min Modality:      []  Ice     []  Heat       Position/location:   -   Rationale: decrease pain to improve the patients ability to do functional activities   [x] Skin assessment post-treatment:  [x]intact []redness- no adverse reaction    []redness  adverse reaction:      35 min Therapeutic Exercise:  [x] See flow sheet :      HELD today Heel raise B/L with eccentric control 2 x10     Not today Pron/sup. Standing on BAPS level 3     Not today Pron/sup standing on rockerboard. Not today M/L on the rockerboard x 2 minutes. Not today glute activation exercise and standing hip abduction, standing hip extension ( on 1/2 foam), cues to inc. Speed. Not today Plyo jumps 2 x 10.    hip hinge with Foam roller  2x/10.        glute med. Posterior medial and lateral heel taps with slider. Not today rebounder standing on level ground and standing on 1/2 foam.      Not today  AIREX foam walk with hip hinge to put cones on floor, 4 cones 3 reps. not today Hamstring curl with swiss ball bridge     Prone hip ext with knee flexion, 2 pillows under pelvis  Quadruped hip ext with knee flexion  \"break dance\" with focus on glute activation / hip ext. Rationale: increase strength, improve coordination and increase proprioception to improve the patients ability to run, walk without pain. 10 min Manual Therapy:    STM to mid biceps femoris muscle. Rationale: decrease pain, increase tissue extensibility and decrease trigger points  to improve the patients ability to run, walk without pain. With   [] TE   [] TA   [] neuro   [] other: Patient Education: [x] Review HEP    [] Progressed/Changed HEP based on:   [] positioning   [] body mechanics   [] transfers   [] heat/ice application    [] other:      Pain Level (0-10 scale) post treatment: pt reports 0/10 pain in heel / hamstring but hamstring does feel tight. ASSESSMENT/Changes in Function:   Observed muscle fasciculations with glute activation / isolation with new exercises today. Pt encouraged to add at home. Pt may be having inc. Right hamstring \"tightness\" due to glute inhibition. Pt noting some discomfort in her back and more fatigue with right hip ext. Testing as compared to left. Patient will continue to benefit from skilled PT services to modify and progress therapeutic interventions, address strength deficits, analyze and address soft tissue restrictions, assess and modify postural abnormalities and instruct in home and community integration to attain remaining goals. Short Term Goals: To be accomplished in 2-3 weeks:              1) Pt independent in HEP from day 1 MET               2) Pt will report right hamstring tightness/pain is intermittent versus constant. MET  3) Pt will report at rest or with walking she does not have right hamstring pain. MET - tightness only. Long Term Goals:  To be accomplished in 4-8 weeks:              1) Pt independent in final HEP.  Progressing. 2) Pt will be able to run 30 minutes without right hamstring pain during or within 24 hours after. progressing. 3) Pt will be able to hike for 30 ' or more without right hamstring pain. MET               4) Pt will test 5/5 in prone with right hamstring without increased pain to demonstrate improved strength to be able to run and participate in pilates without increased pain or symptoms.  MET     PLAN      [x]  Continue plan of care    [x]  Other:_cont 2x/week for 4 weeks from 05/03  re-eval.      Leticia Lowe, PT DPT, [facility] 5/24/2022  25:67 PM       PT License #[id number]

## 2022-05-31 ENCOUNTER — APPOINTMENT (OUTPATIENT)
Dept: PHYSICAL THERAPY | Age: 47
End: 2022-05-31
Payer: OTHER GOVERNMENT

## 2022-06-06 ENCOUNTER — HOSPITAL ENCOUNTER (OUTPATIENT)
Dept: PHYSICAL THERAPY | Age: 47
Discharge: HOME | End: 2022-06-06
Payer: OTHER GOVERNMENT

## 2022-06-06 PROCEDURE — 97110 THERAPEUTIC EXERCISES: CPT | Performed by: PHYSICAL THERAPIST

## 2022-06-06 PROCEDURE — 97140 MANUAL THERAPY 1/> REGIONS: CPT | Performed by: PHYSICAL THERAPIST

## 2022-06-06 NOTE — PROGRESS NOTES
PT DAILY TREATMENT NOTE - [facility] 2-15    Patient Name: Deep Loving  Date:2022  : 1975  [x]  Patient  Verified  Payor:  / Plan: Radha Ruvalcaba / Product Type:  /    In time: 612  Out time: 920  Total Treatment Time (min):50   Total Timed Codes (min): 50  1:1 Treatment Time [facility] only):40    Visit #: 24    Treatment Area: Leg pain, right [M79.604]    SUBJECTIVE  Pain Level (0-10 scale), subjective functional status/changes: Pt reports her heel pain is 0/10. She is now running 3 miles without walking. The tightness in her hamstring is still on and off. Any medication changes, allergies to medications, adverse drug reactions, diagnosis change, or new procedure performed?: [x] No    [] Yes (see summary sheet for update) SEE ABOVE. OBJECTIVE         Pt to ice at home min Modality:      []  Ice     []  Heat       Position/location:   -   Rationale: decrease pain to improve the patients ability to do functional activities   [x] Skin assessment post-treatment:  [x]intact []redness- no adverse reaction    []redness - adverse reaction:      40 min Therapeutic Exercise:  [x] See flow sheet :      HELD today Heel raise B/L with eccentric control 2 x10      Pron/sup. Standing on BAPS level 3     Not today Pron/sup standing on rockerboard. Not today M/L on the rockerboard x 2 minutes. Not today glute activation exercise and standing hip abduction, standing hip extension ( on 1/2 foam), cues to inc. Speed. Not today Plyo jumps 2 x 10.    hip hinge with Foam roller  2x/10.        glute med. Posterior medial and lateral heel taps with slider. rebounder standing on level ground and standing on 1/2 foam.      Not today  AIREX foam walk with hip hinge to put cones on floor, 4 cones 3 reps.        not today Hamstring curl with swiss ball bridge     Prone hip ext with knee flexion, 2 pillows under pelvis  Quadruped hip ext with knee flexion  \"break dance\" with focus on glute activation / hip ext. Added \"froggies\"      Rationale: increase strength, improve coordination and increase proprioception to improve the patients ability to run, walk without pain. 10 min Manual Therapy:    STM to mid biceps femoris muscle. Rationale: decrease pain, increase tissue extensibility and decrease trigger points  to improve the patients ability to run, walk without pain. With   [] TE   [] TA   [] neuro   [] other: Patient Education: [x] Review HEP    [] Progressed/Changed HEP based on:   [] positioning   [] body mechanics   [] transfers   [] heat/ice application    [] other:      Pain Level (0-10 scale) post treatment: pt reports 0/10 pain in heel / hamstring but cont. With hamstring tightness. ASSESSMENT/Changes in Function:   Pt cont. To be challenged by quadruped [place] dance\" exercise and challenged with addition of monster walks and \"froggies\" as well for glute. Activation. Patient will continue to benefit from skilled PT services to modify and progress therapeutic interventions, address strength deficits, analyze and address soft tissue restrictions, assess and modify postural abnormalities and instruct in home and community integration to attain remaining goals. Short Term Goals: To be accomplished in 2-3 weeks:              1) Pt independent in HEP from day 1 MET               2) Pt will report right hamstring tightness/pain is intermittent versus constant. MET  3) Pt will report at rest or with walking she does not have right hamstring pain. MET - tightness only. Long Term Goals: To be accomplished in 4-8 weeks:              1) Pt independent in final HEP. Progressing. 2) Pt will be able to run 30 minutes without right hamstring pain during or within 24 hours after. progressing. 3) Pt will be able to hike for 30 ' or more without right hamstring pain.   MET               4) Pt will test 5/5 in prone with right hamstring without increased pain to demonstrate improved strength to be able to run and participate in pilates without increased pain or symptoms.  MET     PLAN      [x]  Continue plan of care    [x]  Other:_cont 2x/week for 4 weeks from 05/03  re-eval.   * Re-eval next visit *     Mechelle Medrano, PT DPT, O[facility] 6/6/2022  64:04 PM       PT License #[id number]

## 2022-06-13 ENCOUNTER — APPOINTMENT (OUTPATIENT)
Dept: PHYSICAL THERAPY | Age: 47
End: 2022-06-13
Payer: OTHER GOVERNMENT

## 2022-06-15 ENCOUNTER — APPOINTMENT (OUTPATIENT)
Dept: PHYSICAL THERAPY | Age: 47
End: 2022-06-15
Payer: OTHER GOVERNMENT

## 2022-06-16 ENCOUNTER — HOSPITAL ENCOUNTER (OUTPATIENT)
Dept: PHYSICAL THERAPY | Age: 47
Discharge: HOME | End: 2022-06-16
Payer: OTHER GOVERNMENT

## 2022-06-16 PROCEDURE — 97116 GAIT TRAINING THERAPY: CPT | Performed by: PHYSICAL THERAPIST

## 2022-06-16 PROCEDURE — 97110 THERAPEUTIC EXERCISES: CPT | Performed by: PHYSICAL THERAPIST

## 2022-06-16 NOTE — PROGRESS NOTES
PT Re-eval / DAILY TREATMENT NOTE - [facility] 2-15    Patient Name: Davidson Schilling  Date:2022  : 1975  [x]  Patient  Verified  Payor: Zully Lockhart / Plan: Jace Chavez / Product Type: Commerical /    In time: 400  Out time: 450  Total Treatment Time (min):50   Total Timed Codes (min): 50  1:1 Treatment Time [facility] only):50     Visit #: 25    Treatment Area: Leg pain, right [M79.604]    SUBJECTIVE  Pain Level (0-10 scale), subjective functional status/changes: Pt reports her heel pain is 0/10. She ran 4 miles today and ran 6 miles Saturday morning. Pt reports no heel pain, no hamstring pain during but hamstring tightness after. She does notice discomfort if she walks barefeet after running. Any medication changes, allergies to medications, adverse drug reactions, diagnosis change, or new procedure performed?: [x] No    [] Yes (see summary sheet for update) SEE ABOVE. OBJECTIVE     Strength:  Tests 5/5 glute and hamstring. Pt notes feels slight weakness in RIGHT glute test as compared to right. Single limb squat:  Able to perform x 5 on either LE, mild hip ADD/IR at times during performance on either LE. Balance:  Able to hold 30 \" with good lumbopelvic control. Gait:  Running gait:  Pt with upright trunk / decreased forward lean and heel strikes anterior to her torso/body. Palpation:  No TTP right biceps femoris today.       Pt to ice at home min Modality:      []  Ice     []  Heat       Position/location:   -   Rationale: decrease pain to improve the patients ability to do functional activities   [x] Skin assessment post-treatment:  [x]intact []redness- no adverse reaction    []redness - adverse reaction:      40 min Therapeutic Exercise:  [x] See flow sheet :            rebounder standing on level ground and standing on 1/2 foam.        Prone hip ext with knee flexion, 2 pillows under pelvis  Quadruped hip ext with knee flexion (not today)   \"break dance\" with focus on glute activation / hip ext. \"froggies\"      Rationale: increase strength, improve coordination and increase proprioception to improve the patients ability to run, walk without pain. GAIT TRAINING: 10 min     Running gait:  Pt given cues for slight anterior lean as well as landing with her foot directly beneath her COM as oppose to anterior. Pt given cue to \"land more quietly\" to decrease heel strike / landing anterior to her body. -  min Manual Therapy:    N/A     Rationale: decrease pain, increase tissue extensibility and decrease trigger points  to improve the patients ability to run, walk without pain. With   [] TE   [] TA   [] neuro   [] other: Patient Education: [x] Review HEP    [] Progressed/Changed HEP based on:   [] positioning   [] body mechanics   [] transfers   [] heat/ice application    [] other:      Pain Level (0-10 scale) post treatment: pt reports 0/10 pain in heel / hamstring but cont. With hamstring tightness. ASSESSMENT/Changes in Function:   Pt needing cues to perform monster walks with correct form and we also added SL paloff press as well as forward step and hold with 5 lb weight. Pt running gait may be contributing to cont. Tightness in hamstring and awareness of right heel. Overall, pt has made significant improvement including increased strength, decreased pain and able to run and walk without pain *but cont. To have HS tightness after running *. Patient will continue to benefit from skilled PT services to modify and progress therapeutic interventions, address strength deficits, analyze and address soft tissue restrictions, assess and modify postural abnormalities and instruct in home and community integration to attain remaining goals. Short Term Goals: To be accomplished in 2-3 weeks:              1) Pt independent in HEP from day 1 MET               2) Pt will report right hamstring tightness/pain is intermittent versus constant.  MET  3) Pt will report at rest or with walking she does not have right hamstring pain. MET    Long Term Goals: To be accomplished in 4-8 weeks:              1) Pt independent in final HEP. Progressing. 2) Pt will be able to run 30 minutes without right hamstring pain during or within 24 hours after. partially met, no pain or tightness during but tightness after. 3) Pt will be able to hike for 30 ' or more without right hamstring pain. MET               4) Pt will test 5/5 in prone with right hamstring without increased pain to demonstrate improved strength to be able to run and participate in pilates without increased pain or symptoms. MET     PLAN      [x]  Continue plan of care    [x]  Other: cont 1 x/ every 1-2 weeks to be able to run without hamstring tightness, focus on gait training as needed.      Keyanna Farr, PT DPT, [facility] 6/16/2022  86:69 PM       PT License #[id number]

## 2022-06-21 ENCOUNTER — HOSPITAL ENCOUNTER (OUTPATIENT)
Dept: PHYSICAL THERAPY | Age: 47
Discharge: HOME | End: 2022-06-21
Payer: OTHER GOVERNMENT

## 2022-06-21 PROCEDURE — 97116 GAIT TRAINING THERAPY: CPT | Performed by: PHYSICAL THERAPIST

## 2022-06-21 PROCEDURE — 97110 THERAPEUTIC EXERCISES: CPT | Performed by: PHYSICAL THERAPIST

## 2022-06-21 NOTE — PROGRESS NOTES
PT DAILY TREATMENT NOTE - [facility] 2-15    Patient Name: Sara Fish  Date:2022  : 1975  [x]  Patient  Verified  Payor: David Crystal / Plan: Jefry Dickerson / Product Type: Commerical /    In time: 400  Out time: 450  Total Treatment Time (min):50   Total Timed Codes (min): 50  1:1 Treatment Time [facility] only):50     Visit #: 26    Treatment Area: Leg pain, right [M79.604]    SUBJECTIVE  Pain Level (0-10 scale), subjective functional status/changes: Pt reports her heel pain is 0/10. She ran 4 miles this weekend. Pt reports it has been hard to try to land with her foot under her body. Any medication changes, allergies to medications, adverse drug reactions, diagnosis change, or new procedure performed?: [x] No    [] Yes (see summary sheet for update) SEE ABOVE. OBJECTIVE       Pt to ice at home min Modality:      []  Ice     []  Heat       Position/location:   -   Rationale: decrease pain to improve the patients ability to do functional activities   [x] Skin assessment post-treatment:  [x]intact []redness- no adverse reaction    []redness - adverse reaction:      40 min Therapeutic Exercise:  [x] See flow sheet :    added FW step up with 6 inch step and power cord  Skipping A's,  Cont. With agility ladder  SLS paloff press  plyo eccentric with heel raises. Rationale: increase strength, improve coordination and increase proprioception to improve the patients ability to run, walk without pain. GAIT TRAINING: 10 min     Running gait:  Pt given cues for slight anterior lean as well as landing with her foot directly beneath her COM as oppose to anterior. Pt given cue to \"land more quietly\" to decrease heel strike / landing anterior to her body. Pt with wilber at 180 steps/minute. -  min Manual Therapy:    N/A     Rationale: decrease pain, increase tissue extensibility and decrease trigger points  to improve the patients ability to run, walk without pain.             With   [] TE   [] TA   [] neuro   [] other: Patient Education: [x] Review HEP    [] Progressed/Changed HEP based on:   [] positioning   [] body mechanics   [] transfers   [] heat/ice application    [] other:      Pain Level (0-10 scale) post treatment: pt reports 0/10 pain in heel but has 1/10 hamstring tightness. ASSESSMENT/Changes in Function:   Pt with improved gait mechanics with observation today but cont. To note increased. Hamstring tightness (although only has made changes since last Thursday). Overall, pt making excellent progress in ther she has been running up to 4 miles. Patient will continue to benefit from skilled PT services to modify and progress therapeutic interventions, address strength deficits, analyze and address soft tissue restrictions, assess and modify postural abnormalities and instruct in home and community integration to attain remaining goals. Short Term Goals: To be accomplished in 2-3 weeks:              1) Pt independent in HEP from day 1 MET               2) Pt will report right hamstring tightness/pain is intermittent versus constant. MET  3) Pt will report at rest or with walking she does not have right hamstring pain. MET    Long Term Goals: To be accomplished in 4-8 weeks:              1) Pt independent in final HEP. Progressing. 2) Pt will be able to run 30 minutes without right hamstring pain during or within 24 hours after. partially met, no pain or tightness during but tightness after. 3) Pt will be able to hike for 30 ' or more without right hamstring pain. MET               4) Pt will test 5/5 in prone with right hamstring without increased pain to demonstrate improved strength to be able to run and participate in pilates without increased pain or symptoms. MET     PLAN      [x]  Continue plan of care    [x]  Other: cont 1 x/ every 1-2 weeks to be able to run without hamstring tightness, focus on gait training as needed.      Bj Herbert, PT TALAT, GENESIS 6/21/2022  87:80 PM       PT License #[id number]

## 2022-06-22 ENCOUNTER — APPOINTMENT (OUTPATIENT)
Dept: PHYSICAL THERAPY | Age: 47
End: 2022-06-22
Payer: OTHER GOVERNMENT

## 2022-06-28 ENCOUNTER — APPOINTMENT (OUTPATIENT)
Dept: PHYSICAL THERAPY | Age: 47
End: 2022-06-28
Payer: OTHER GOVERNMENT

## 2022-07-05 ENCOUNTER — HOSPITAL ENCOUNTER (OUTPATIENT)
Dept: PHYSICAL THERAPY | Age: 47
Discharge: HOME | End: 2022-07-05
Payer: OTHER GOVERNMENT

## 2022-07-05 ENCOUNTER — APPOINTMENT (OUTPATIENT)
Dept: PHYSICAL THERAPY | Age: 47
End: 2022-07-05
Payer: OTHER GOVERNMENT

## 2022-07-05 PROCEDURE — 97016 VASOPNEUMATIC DEVICE THERAPY: CPT | Performed by: PHYSICAL THERAPIST

## 2022-07-05 PROCEDURE — 97110 THERAPEUTIC EXERCISES: CPT | Performed by: PHYSICAL THERAPIST

## 2022-07-05 NOTE — PROGRESS NOTES
PT to MD NOTE 2-15    Patient Name: Jose Mayorga  Date:2022  : 1975  [x]  Patient  Verified  Payor: [employer] / Plan: Tavia Mejias / Product Type: Commerical /          Treatment Area: Leg pain, right [M79.604]         SUBJECTIVE  Pain Level (0-10 scale), subjective functional status/changes: Pt reports her right knee is sore (inside of her knee). Pt reports she was running last week and about 1 mile into her run she experienced increased right medial knee pain. She could not continue to run at that time. She did not have any swelling but was unable to bend or straighten her knee. She is much better today but feels like she can't trust her knee. She saw Dr. Florence Taylor and he thought she had a partial tear of her meniscus. She will f/u with him in 2 weeks (which will be next week). OBJECTIVE      Right knee AROM 4 deg to 122 deg (post exercises to 144 deg)  Left knee AROM 0 deg to 150 deg.       Strength:  Tests 5/5 quad, ham.       Gait: no sig. Deviations today.      ASSESSMENT/Changes in Function:   Pt recently with increased right knee pain and inability to bend or straighten her knee which happened last week during a run. She saw Dr. Florence Taylor and he thought she had a meniscus tear. He offered MRI versus f/u with him in 2 weeks and she opted for the f/u in 2 weeks. Pt presents with decreased right knee AROM (although improved after performing ther. Ex. As noted above) as well as increased soreness right knee and now unable to run. She had been making excellent progress up to this set back. Pt encouraged to avoid high impart exercises and to perform exercises on hand out which we provided today. She seems to be much better today compared to last week in regards to pain and limited ROM.               Short Term Goals: To be accomplished in 2-3 weeks:              1) Pt independent in HEP from day 1 MET               2) Pt will report right hamstring tightness/pain is intermittent versus constant. MET  3) Pt will report at rest or with walking she does not have right hamstring pain. MET     Long Term Goals: To be accomplished in 4-8 weeks:              1) Pt independent in final HEP. Progressing.              2) Pt will be able to run 30 minutes without right hamstring pain during or within 24 hours after.  partially met, no pain or tightness during but tightness after.                3) Pt will be able to hike for 30 ' or more without right hamstring pain.  MET               4) Pt will test 5/5 in prone with right hamstring without increased pain to demonstrate improved strength to be able to run and participate in pilates without increased pain or symptoms.  MET      PLAN      [x]? Continue plan of care     [x]?   Other: cont 1-2 x/ week for 4 weeks from 07/05         Bg Jean PT DPT, OCS 7/5/2022  5:99 PM       PT License #[id number]

## 2022-07-05 NOTE — PROGRESS NOTES
PT re-evaluation / DAILY TREATMENT NOTE - [facility] 2-15    Patient Name: Harvinder Barrientos  Date:2022  : 1975  [x]  Patient  Verified  Payor: Ying Vickers / Plan: Theo Escort / Product Type: Commerical /    In time: 425  Out time: 530  Total Treatment Time (min):65   Total Timed Codes (min): 50  1:1 Treatment Time [facility] only):50  Visit #: 27    Treatment Area: Leg pain, right [M79.604]    SUBJECTIVE  Pain Level (0-10 scale), subjective functional status/changes: Pt reports her right knee is sore (inside of her knee). Pt reports she was running last week and about 1 mile into her run she experienced increased right medial knee pain. She could not continue to run at that time. She did not have any swelling but was unable to bend or straighten her knee. She is much better today but feels like she can't trust her knee. She saw Dr. Pimentel Late and he thought she had a partial tear of her meniscus. She will f/u with him in 2 weeks (which will be next week). Any medication changes, allergies to medications, adverse drug reactions, diagnosis change, or new procedure performed?: [x] No    [] Yes (see summary sheet for update) SEE ABOVE. OBJECTIVE     Right knee AROM 4 deg to 122 deg (post exercises to 144 deg)  Left knee AROM 0 deg to 150 deg. Strength:  Tests 5/5 quad, ham.      15 '  min Modality:      []  Ice     []  Heat       Position/location:   vasopneumatic x 15 min, medium compression for decreased pain and inflammation   Rationale: decrease pain to improve the patients ability to do functional activities   [x] Skin assessment post-treatment:  [x]intact []redness- no adverse reaction    []redness - adverse reaction:      50 min Therapeutic Exercise:  [x] See flow sheet :    see HEP lift in the chart, pt performed 10-20 reps of all of these (SLR supine, sidelying, clams, bridge, heel slides, ham stretch, quadruped straight leg hip ext, mini squat, weight shift).           Rationale: increase strength, improve coordination and increase proprioception to improve the patients ability to run, walk without pain. -  min Manual Therapy:    N/A     Rationale: decrease pain, increase tissue extensibility and decrease trigger points  to improve the patients ability to run, walk without pain. With   [] TE   [] TA   [] neuro   [] other: Patient Education: [x] Review HEP    [] Progressed/Changed HEP based on:   [] positioning   [] body mechanics   [] transfers   [] heat/ice application    [] other:      Pain Level (0-10 scale) post treatment:no pain / cold from ice. ASSESSMENT/Changes in Function:   Pt recently with increased right knee pain and inability to bend or straighten her knee which happened last week during a run. She saw Dr. Chacorta Couch and he thought she had a meniscus tear. He offered MRI versus f/u with him in 2 weeks and she opted for the f/u in 2 weeks. Pt presents with decreased right knee AROM (although improved after performing ther. Ex. As noted above) as well as increased soreness right knee and now unable to run. She had been making excellent progress up to this set back. Pt encouraged to avoid high impart exercises and to perform exercises on hand out which we provided today. Patient will continue to benefit from skilled PT services to modify and progress therapeutic interventions, address strength deficits, analyze and address soft tissue restrictions, assess and modify postural abnormalities and instruct in home and community integration to attain remaining goals. Short Term Goals: To be accomplished in 2-3 weeks:              1) Pt independent in HEP from day 1 MET               2) Pt will report right hamstring tightness/pain is intermittent versus constant. MET  3) Pt will report at rest or with walking she does not have right hamstring pain. MET    Long Term Goals: To be accomplished in 4-8 weeks:              1) Pt independent in final HEP. Progressing. 2) Pt will be able to run 30 minutes without right hamstring pain during or within 24 hours after. partially met, no pain or tightness during but tightness after. 3) Pt will be able to hike for 30 ' or more without right hamstring pain. MET               4) Pt will test 5/5 in prone with right hamstring without increased pain to demonstrate improved strength to be able to run and participate in pilates without increased pain or symptoms.  MET     PLAN      [x]  Continue plan of care    [x]  Other: cont 1-2 x/ week for 4 weeks from 07/05    Aris Osgood, PT DPT, OCS 7/5/2022  00:88 PM       PT License #[id number]

## 2022-07-28 ENCOUNTER — APPOINTMENT (OUTPATIENT)
Dept: PHYSICAL THERAPY | Age: 47
End: 2022-07-28
Payer: OTHER GOVERNMENT

## 2022-08-02 ENCOUNTER — APPOINTMENT (OUTPATIENT)
Dept: PHYSICAL THERAPY | Age: 47
End: 2022-08-02

## 2022-08-15 ENCOUNTER — OFFICE VISIT (OUTPATIENT)
Dept: PRIMARY CARE CLINIC | Age: 47
End: 2022-08-15
Payer: OTHER GOVERNMENT

## 2022-08-15 VITALS
OXYGEN SATURATION: 98 % | RESPIRATION RATE: 16 BRPM | HEIGHT: 66 IN | BODY MASS INDEX: 21.5 KG/M2 | SYSTOLIC BLOOD PRESSURE: 92 MMHG | DIASTOLIC BLOOD PRESSURE: 61 MMHG | HEART RATE: 79 BPM | TEMPERATURE: 97.5 F | WEIGHT: 133.8 LBS

## 2022-08-15 DIAGNOSIS — Z13.220 ENCOUNTER FOR LIPID SCREENING FOR CARDIOVASCULAR DISEASE: ICD-10-CM

## 2022-08-15 DIAGNOSIS — Z13.6 ENCOUNTER FOR LIPID SCREENING FOR CARDIOVASCULAR DISEASE: ICD-10-CM

## 2022-08-15 DIAGNOSIS — Z00.00 WELL WOMAN EXAM (NO GYNECOLOGICAL EXAM): ICD-10-CM

## 2022-08-15 DIAGNOSIS — Z63.79 OTHER STRESSFUL LIFE EVENTS AFFECTING FAMILY AND HOUSEHOLD: Primary | ICD-10-CM

## 2022-08-15 PROCEDURE — 99396 PREV VISIT EST AGE 40-64: CPT | Performed by: FAMILY MEDICINE

## 2022-08-15 PROCEDURE — 99212 OFFICE O/P EST SF 10 MIN: CPT | Performed by: FAMILY MEDICINE

## 2022-08-15 RX ORDER — LEVONORGESTREL AND ETHINYL ESTRADIOL 0.1-0.02MG
1 KIT ORAL DAILY
COMMUNITY
Start: 2022-06-23

## 2022-08-15 NOTE — PROGRESS NOTES
HPI:  Dom Mccray is a 40 y.o. female presenting for well woman exam.     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 States she is interested in individual counseling. Denies depression or anxiety but thinks it may be helpful to have someone to talk to regularly. Diet: normal   Exercise:   Tobacco Use: never  Alcohol: socially  Sexually active: yes  Desires STD testing: no    Allergies- reviewed: Allergies   Allergen Reactions    Corn Rash    Neosporin [Neomycin-Bacitracnzn-Polymyxnb] Hives    Tomato Rash       Medications- reviewed:   Current Outpatient Medications   Medication Sig    Lessina 0.1-20 mg-mcg tab Take 1 Tablet by mouth daily. B.infantis-B.ani-B.long-B.bifi (Probiotic 4X) 10-15 mg TbEC Take  by mouth.    mesalamine DR (ASACOL HD) 800 mg DR tablet Asacol  mg tablet,delayed release   Prescribed by non [facility] MD    fexofenadine (ALLEGRA) 180 mg tablet Allegra Allergy 180 mg tablet    diclofenac (VOLTAREN) 1 % gel Apply 2 g to affected area every six (6) hours. (Patient not taking: No sig reported)     No current facility-administered medications for this visit.          Past Medical History- reviewed:  Past Medical History:   Diagnosis Date    Asthma          Past Surgical History- reviewed:   Past Surgical History:   Procedure Laterality Date    HX KNEE ARTHROSCOPY Bilateral     Meniscus    HX OTHER SURGICAL  01/2021    sinus surgery     HX SEPTOPLASTY  01/2021         Family History - reviewed:  Family History   Problem Relation Age of Onset    Hypertension Mother     Elevated Lipids Mother     No Known Problems Father     Elevated Lipids Sister     Hypertension Sister          Social History - reviewed:  Social History     Socioeconomic History    Marital status:      Spouse name: Not on file    Number of children: Not on file    Years of education: Not on file    Highest education level: Not on file   Occupational History    Not on file   Tobacco Use Smoking status: Never    Smokeless tobacco: Never   Vaping Use    Vaping Use: Never used   Substance and Sexual Activity    Alcohol use: Yes     Comment: occasionally    Drug use: Never    Sexual activity: Yes     Partners: Male     Birth control/protection: None   Other Topics Concern    Not on file   Social History Narrative    Not on file     Social Determinants of Health     Financial Resource Strain: Not on file   Food Insecurity: Not on file   Transportation Needs: Not on file   Physical Activity: Not on file   Stress: Not on file   Social Connections: Not on file   Intimate Partner Violence: Not on file   Housing Stability: Not on file         Immunizations - reviewed:   Immunization History   Administered Date(s) Administered    COVID-19, MODERNA BLUE border, Primary or Immunocompromised, (age 18y+), IM, 100 mcg/0.5mL 03/08/2021, 04/05/2021    COVID-19, PFIZER PURPLE top, DILUTE for use, (age 15 y+), IM, 30mcg/0.3mL 12/27/2021    Influenza Vaccine 10/10/2020, 10/06/2021    Influenza Vaccine (Quad) Mdck Pf (>2 Yrs Flucelvax QUAD 43398) 10/03/2019    Pneumococcal Conjugate PCV20, PF (Prevnar 20) 03/07/2022    Tdap 08/30/2021     Flu: NA  Tdap: 2021  Pneumovax: 2022  Zostervax: NA    Health Maintenance reviewed -  Pap smear - 2022, normal  Mammogram - 2022, normal  Colonoscopy - paternal aunt on father's side at [de-identified] YO, hx of colitis, has been getting them for about 8 years, next is scheduled in October, always normal, done by Dr. Trinidad Mijares scan NA  Hepatitis C testing - done  Lung cancer screening NA    Review of Systems   Denies fever, chills, sore throat, cough, chest pain, shortness of breath, abd pain, dysuria, hematuria, breast masses, nipple discharge    Physical Exam  Visit Vitals  BP 92/61 (BP 1 Location: Right arm, BP Patient Position: Sitting, BP Cuff Size: Adult)   Pulse 79   Temp 97.5 °F (36.4 °C) (Temporal)   Resp 16   Ht 5' 6\" (1.676 m)   Wt 133 lb 12.8 oz (60.7 kg)   LMP 08/08/2022 (Approximate)   SpO2 98%   BMI 21.60 kg/m²       General appearance - alert, well appearing, and in no distress  Eyes - sclera anicteric, left eye normal, right eye normal  Ears - bilateral TM's and external ear canals normal  Nose - normal and patent, no erythema, discharge or polyps  Mouth - mucous membranes moist, pharynx normal without lesions  Neck - supple, no significant adenopathy  Chest - clear to auscultation, no wheezes, rales or rhonchi, symmetric air entry  Heart - normal rate, regular rhythm, normal S1, S2, no murmurs, rubs, clicks or gallops  Abdomen - soft, nontender, nondistended, no masses or organomegaly  Neurological - alert, oriented, normal speech, no focal findings or movement disorder noted  Musculoskeletal - full range of motion without pain  Extremities - peripheral pulses normal, no pedal edema, no clubbing or cyanosis  Skin - normal coloration and turgor, no rashes, no suspicious skin lesions noted  Pelvic - not examined  Breast - not examined    Assessment/Plan:    ICD-10-CM ICD-9-CM    1. Other stressful life events affecting family and household  Z63.79 V61.09 REFERRAL TO PSYCHOLOGY      2. Well woman exam (no gynecological exam)  Z00.00 V70.0 CBC W/O DIFF      METABOLIC PANEL, COMPREHENSIVE      3. Encounter for lipid screening for cardiovascular disease  Z13.220 V77.91 LIPID PANEL    Z13.6 V81.2           Given list of counselors. I have discussed the diagnosis with the patient and the intended plan as seen in the above orders. Patient verbalized understanding of the plan and agrees with the plan. The patient has received an after-visit summary and questions were answered concerning future plans. I have discussed medication side effects and warnings with the patient as well. Informed patient to return to the office if new symptoms arise.     Linwood Ruvalcaba, DO

## 2022-08-15 NOTE — PROGRESS NOTES
Identified pt with two pt identifiers(name and ). Chief Complaint   Patient presents with    Physical        3 most recent PHQ Screens 8/15/2022   Little interest or pleasure in doing things Not at all   Feeling down, depressed, irritable, or hopeless Not at all   Total Score PHQ 2 0        Vitals:    08/15/22 1101   BP: 92/61   Pulse: 79   Resp: 16   Temp: 97.5 °F (36.4 °C)   TempSrc: Temporal   SpO2: 98%   Weight: 133 lb 12.8 oz (60.7 kg)   Height: 5' 6\" (1.676 m)   LMP: 2022       Health Maintenance Due   Topic Date Due    Colorectal Cancer Screening Combo  Never done        1. Have you been to the ER, urgent care clinic since your last visit? Hospitalized since your last visit? No    2. Have you seen or consulted any other health care providers outside of the [address] [place] [address] since your last visit? Include any pap smears or colon screening. No    3. For patients aged 39-70: Has the patient had a colonoscopy / FIT/ Cologuard? Yes - Care Gap present. Rooming MA/LPN to request most recent results      If the patient is female:    4. For patients aged 41-77: Has the patient had a mammogram within the past 2 years? Yes - no Care Gap present      5. For patients aged 21-65: Has the patient had a pap smear?  Yes - no Care Gap present

## 2022-09-22 ENCOUNTER — HOSPITAL ENCOUNTER (OUTPATIENT)
Dept: PHYSICAL THERAPY | Age: 47
Discharge: HOME | End: 2022-09-22
Payer: OTHER GOVERNMENT

## 2022-09-22 PROCEDURE — 97161 PT EVAL LOW COMPLEX 20 MIN: CPT | Performed by: PHYSICAL THERAPIST

## 2022-09-22 PROCEDURE — 97110 THERAPEUTIC EXERCISES: CPT | Performed by: PHYSICAL THERAPIST

## 2022-09-22 NOTE — PROGRESS NOTES
[facility] [employer] [facility]  [address], [place], [address]  Phone: [phone]  Fax: [phone]      PT INITIAL EVALUATION NOTE - [facility] 2-15    Patient Name: Isacc Rollins  Date:2022  : 1975  [x]  Patient  Verified  Payor:  / Plan: [employer] / Product Type:  /    In time:300  Out time:355  Total Treatment Time (min): 55  Total Timed Codes (min): 55  1:1 Treatment Time ( [address] only): 54   Visit #: 1     Treatment Area: Right knee pain [M25.561]    SUBJECTIVE  Any medication changes, allergies to medications, adverse drug reactions, diagnosis change, or new procedure performed?: [] No    [x] Yes (see summary sheet for update)    Current symptoms/chief complaint: medial menisectomy 22. Date of onset/injury: DOS 22. She went back to work 2 days after. Aggravated by: Pt reports knee is stiff - can't sit nicol cross on the floor, kneel. Walking downstairs: step to pattern - she reports she feels like she doesn't have the control. Eased by:  Ice (she ice's after work, before bed). Pain:   4/10 max 0/10 min 1 \"stiff\"/10 now       Location and description of symptoms: right knee  - has some popping, feels like it will give way. She has had \"zaps\" every few days distal anterior thigh. Diagnostic Tests: [] Lab work [] X-rays    [] CT [x] MRI     [] Other:  Results (per report of the patient): meniscus tear (prior to surgery). PMHX: Significant for right and left knee arthroscopy  and , asthma. Social/Recreation/Work: Pratima Ramirez , teacher. Pt has 2 children,  is in the [employer] outside of [place]. Her daughter is in 5th grade and her son is in 2nd grade. Prior level of function: prior to this injury was running 7 miles. Patient goal(s): \"Full knee mobility\"      Objective:    Posture/Observations: Pt stands without sig.  Deviations but shows slight inc. In swelling inferior patella region, noted arthroscopic incisions consistent with recent surgery. Gait: Pt with slight decrease in quad recruitment at heel strike on the RIGHT as compared to LEFT.          ROM:    RIGHT knee ext: - 3 deg PROM - 1 deg AROM. Flexion 137 deg \"tight\" \"stretching\"     LEFT knee ext: - 3 deg PROM, - 1 deg AROM. Flexion 150 deg. Strength:  Quad and ham strength: tests 4+/5. Functional Biomechanical Screen  SLS:R LE:  limited to 10 sec at a time before needing UE touch down. Single Limb Squat:NT today. Stairs:  Ascends step over step, descends at 45 deg angle and step to pattern, use of UE support. Palpation:  Tenderness to palpation: TTP distal to right patella diffusely, noted mild bruising from surgical interventions. Joint Mobility:  restricted inferior, medial and lateral PF mobs. Pt with pain with palpation needed for superior PF mob. Outcome Measure: Patient presents with a FOTO intake summary score of next visit, not set u. OBJECTIVE    25 min Therapeutic Exercise:  [x] See flow sheet : see HEP sheet:  taught pt self inferior patellar mobs, pt performed: quad sets, quad set with SLR, S/L SLR hip ABD, SLS level ground, SLS level ground with head turns, clocks, quarter squats, B/L heel raises, seated hamstring stretch. Rationale: increase ROM and increase strength to improve the patients ability to negotiate stairs without pain or difficulty, return to running. With   [] TE   [] TA   [] neuro   [] other: Patient Education: [x] Review HEP    [] Progressed/Changed HEP based on:   [] positioning   [] body mechanics   [] transfers   [] heat/ice application    [] other:        Pain Level (0-10 scale) post treatment: not captured.        Assessment: See POC    Plan: See [address] [facility] [place] PT, DPT, OCS    9/22/2022    2:50 PM

## 2022-09-22 NOTE — PROGRESS NOTES
[employer] [facility]  [facility], [address] [place] [address]  Phone: [phone]  Fax: [phone]    Plan of Care/Statement of Necessity for Physical Therapy Services  2-15    Patient name: Krystal Perdomo  : 1975  Provider#: [id number]  Referral source: Lori Last PA      Medical/Treatment Diagnosis: Right knee pain [M25.561]     Prior Hospitalization: see medical history     Comorbidities: see evaluation. Prior Level of Function: see evaluation. Medications: Verified on Patient Summary List    Start of Care: see evaluation. Onset Date: See evaluation       The Plan of Care and following information is based on the information from the initial evaluation. Assessment/ key information: Pt is a 41 yo female with R knee menisectomy 22. Pt presents with decreased ROM (flexion), decreased PF mobility, increased effusion, increased TTP, decreased ability to negotiate stairs, decreased ability to sit cross legged or kneel to perform work as pre-. Of note, pt was also running prior to injury and was able to run approx. 7 miles and would also like to return to running if possible. Treatment Plan may include any combination of the following: Therapeutic exercise, Therapeutic activities, Neuromuscular re-education, Physical agent/modality, Gait/balance training, Manual therapy, Patient education, and Functional mobility training  Patient / Family readiness to learn indicated by: asking questions, trying to perform skills and interest  Persons(s) to be included in education: patient (P)  Barriers to Learning/Limitations: None  Patient Goal (s): see eval  Patient Self Reported Health Status: good  Rehabilitation Potential: good    Short Term Goals:  To be accomplished in 2-3 weeks:   1) Pt independent in HEP from day 1   2) Pt will improve right knee flexion to 145 deg or more to be within 5 deg of left knee flexion and more easily sit cross legged eventually   3) Pt will be able to perform SLS for 30 sec without loss of balance / UE support needed for improved stability in stance   Long Term Goals: To be accomplished in 4-6 weeks:   1) Pt independent in final HEP. 2) Pt will be able to run 1-2 ' on, 1 ' off at least 8 reps without increased knee pain. 3) Pt will be able to ascend/descend 12 steps step over step without increased knee pain with normalized gait pattern. 4) Pt will be able to sit cross legged to be able to do work as pre-. Frequency / Duration: Patient to be seen 1-2 times per week for 4-6 weeks.     Patient/ Caregiver education and instruction: self care and exercises    [x]  Plan of care has been reviewed with CLAIR Krishnamurthy PT DPT, OCS 9/22/2022 2:51 PM

## 2022-09-29 ENCOUNTER — APPOINTMENT (OUTPATIENT)
Dept: PHYSICAL THERAPY | Age: 47
End: 2022-09-29

## 2022-09-30 ENCOUNTER — HOSPITAL ENCOUNTER (OUTPATIENT)
Dept: PHYSICAL THERAPY | Age: 47
Discharge: HOME | End: 2022-09-30
Payer: OTHER GOVERNMENT

## 2022-09-30 PROCEDURE — 97016 VASOPNEUMATIC DEVICE THERAPY: CPT | Performed by: PHYSICAL THERAPY ASSISTANT

## 2022-09-30 PROCEDURE — 97110 THERAPEUTIC EXERCISES: CPT | Performed by: PHYSICAL THERAPY ASSISTANT

## 2022-09-30 PROCEDURE — 97112 NEUROMUSCULAR REEDUCATION: CPT | Performed by: PHYSICAL THERAPY ASSISTANT

## 2022-09-30 PROCEDURE — 97140 MANUAL THERAPY 1/> REGIONS: CPT | Performed by: PHYSICAL THERAPY ASSISTANT

## 2022-09-30 NOTE — PROGRESS NOTES
PT DAILY TREATMENT NOTE 2-15    Patient Name: Columba Haq  Date:2022  : 1975  [x]  Patient  Verified  Payor: STEVE / Plan: Shad Chowdary 74 / Product Type:  /    In time: 8:00 AM Out time: 9:00 AM  Total Treatment Time (min): 60  Visit #:  2    Treatment Area: Right knee pain [M25.561]    SUBJECTIVE  Pain Level (0-10 scale): 0/10 pain, states compliance with HEP and is icing 3x/day. States she was a little sore in her knee today.   Any medication changes, allergies to medications, adverse drug reactions, diagnosis change, or new procedure performed?: [x] No    [] Yes (see summary sheet for update)  Subjective functional status/changes:   [] No changes reported  See above    OBJECTIVE    Modality rationale: decrease edema, decrease inflammation, and decrease pain to improve the patients ability to perform ADL's   Min Type Additional Details       [] Estim: []Att   []Unatt    []TENS instruct                  []IFC  []Premod   []NMES                     []Other:  []w/US   []w/ice   []w/heat  Position:  Location:       []  Traction: [] Cervical       []Lumbar                       [] Prone          []Supine                       []Intermittent   []Continuous Lbs:  [] before manual  [] after manual  []w/heat    []  Ultrasound: []Continuous   [] Pulsed                       at: []1MHz   []3MHz Location:  W/cm2:    [] Paraffin         Location:   []w/heat    []  Ice     []  Heat  []  Ice massage Position:  Location:    []  Laser  []  Other: Position:  Location:   10   [x]  Vasopneumatic Device Pressure:       [] lo [x] med [] hi   Temperature: 37     [x] Skin assessment post-treatment:  [x]intact []redness- no adverse reaction    []redness - adverse reaction:         30 min Therapeutic Exercise:  [x] See flow sheet : reviewed HEP, added per flow sheet   Rationale: increase ROM, increase strength, and improve coordination to improve the patients ability to ambulate, navigate stairs, 10 min Neuromuscular Re-education:  [x]  See flow sheet :   Rationale: improve coordination, improve balance, and increase proprioception  to improve the patients ability to perform ADL's, ambulate and navigate stairs      10 min Manual Therapy:  gentle R patella mobs, scar mobs, rectus femoris stretch over side of table   Rationale: decrease pain, increase ROM, increase tissue extensibility, and decrease trigger points  to improve the patients ability to perform ADL's, ambulate and navigate stairs            With   [] TE   [] TA   [] Neuro   [] SC   [] other: Patient Education: [x] Review HEP    [] Progressed/Changed HEP based on:   [] positioning   [] body mechanics   [] transfers   [] heat/ice application    [] other:      Other Objective/Functional Measures: NT     Pain Level (0-10 scale) post treatment: 0/10    ASSESSMENT/Changes in Function:   Good compliance with HEP and ice application. Challenged with addition of balance/proprioception but overall did very well today. Discussed gentle scar mobs along portal incisions to decreased scar tissue and to improve mobility. Patient will continue to benefit from skilled PT services to modify and progress therapeutic interventions, address functional mobility deficits, address ROM deficits, address strength deficits, analyze and address soft tissue restrictions, analyze and cue movement patterns, analyze and modify body mechanics/ergonomics, address imbalance/dizziness, and instruct in home and community integration to attain remaining goals.      []  See Plan of Care  []  See progress note/recertification  []  See Discharge Summary         Progress towards goals / Updated goals:  NT    PLAN  []  Upgrade activities as tolerated     [x]  Continue plan of care  []  Update interventions per flow sheet       []  Discharge due to:_  []  Other:_      Rahel Souza, PTA 9/30/2022

## 2022-10-04 ENCOUNTER — HOSPITAL ENCOUNTER (OUTPATIENT)
Dept: PHYSICAL THERAPY | Age: 47
Discharge: HOME | End: 2022-10-04
Payer: OTHER GOVERNMENT

## 2022-10-04 PROCEDURE — 97112 NEUROMUSCULAR REEDUCATION: CPT | Performed by: PHYSICAL THERAPIST

## 2022-10-04 PROCEDURE — 97110 THERAPEUTIC EXERCISES: CPT | Performed by: PHYSICAL THERAPIST

## 2022-10-04 PROCEDURE — 97140 MANUAL THERAPY 1/> REGIONS: CPT | Performed by: PHYSICAL THERAPIST

## 2022-10-04 NOTE — PROGRESS NOTES
PT DAILY TREATMENT NOTE 2-15    Patient Name: Pato Price  Date:10/4/2022  : 1975  [x]  Patient  Verified   Payor:  / Plan: Norm Hsu / Product Type:  /    In time: 100 PM Out time: 80 M  Total Treatment Time (min): 55  Visit #:  3    Treatment Area: Right knee pain [M25.561]    SUBJECTIVE  Pain Level (0-10 scale): 0/10 pain. Pt reports Dr. Cristina Malik told her she could return to running 4-6 weeks from surgery. She is feeling good but still sore around her arthroscopic incisions. Pt reports she still cannot sit nicol cross applesauce.         Any medication changes, allergies to medications, adverse drug reactions, diagnosis change, or new procedure performed?: [x] No    [] Yes (see summary sheet for update)  Subjective functional status/changes:   [] No changes reported  See above    OBJECTIVE    Modality rationale: decrease edema, decrease inflammation, and decrease pain to improve the patients ability to perform ADL's   Min Type Additional Details       [] Estim: []Att   []Unatt    []TENS instruct                  []IFC  []Premod   []NMES                     []Other:  []w/US   []w/ice   []w/heat  Position:  Location:       []  Traction: [] Cervical       []Lumbar                       [] Prone          []Supine                       []Intermittent   []Continuous Lbs:  [] before manual  [] after manual  []w/heat    []  Ultrasound: []Continuous   [] Pulsed                       at: []1MHz   []3MHz Location:  W/cm2:    [] Paraffin         Location:   []w/heat    []  Ice     []  Heat  []  Ice massage Position:  Location:    []  Laser  []  Other: Position:  Location:   -   [x]  Vasopneumatic Device Pressure:       [] lo [x] med [] hi   Temperature: 37     [x] Skin assessment post-treatment:  [x]intact []redness- no adverse reaction    []redness - adverse reaction:         35 min Therapeutic Exercise:  [x] See flow sheet : reviewed HEP, added per flow sheet   Rationale: increase ROM, increase strength, and improve coordination to improve the patients ability to ambulate, navigate stairs,        10 min Neuromuscular Re-education:  [x]  See flow sheet :   Rationale: improve coordination, improve balance, and increase proprioception  to improve the patients ability to perform ADL's, ambulate and navigate stairs      10 min Manual Therapy:  inferior patella mobs in 20 deg flexion and approx. 70 deg flexion, gentle scar incision mobilization to arthroscopic incisions. Rationale: decrease pain, increase ROM, increase tissue extensibility, and decrease trigger points  to improve the patients ability to perform ADL's, ambulate and navigate stairs            With   [] TE   [] TA   [] Neuro   [] SC   [] other: Patient Education: [x] Review HEP    [] Progressed/Changed HEP based on:   [] positioning   [] body mechanics   [] transfers   [] heat/ice application    [] other:      Other Objective/Functional Measures: NT     Pain Level (0-10 scale) post treatment: 0/10    ASSESSMENT/Changes in Function:   Pt did well with progression in therapeutic exercises. Patient will continue to benefit from skilled PT services to modify and progress therapeutic interventions, address functional mobility deficits, address ROM deficits, address strength deficits, analyze and address soft tissue restrictions, analyze and cue movement patterns, analyze and modify body mechanics/ergonomics, address imbalance/dizziness, and instruct in home and community integration to attain remaining goals.      []  See Plan of Care  []  See progress note/recertification  []  See Discharge Summary         Progress towards goals / Updated goals:  NT    PLAN  []  Upgrade activities as tolerated     [x]  Continue plan of care  []  Update interventions per flow sheet       []  Discharge due to:_  []  Other:_      Lindsay Given, PT 10/4/2022

## 2022-10-06 ENCOUNTER — APPOINTMENT (OUTPATIENT)
Dept: PHYSICAL THERAPY | Age: 47
End: 2022-10-06
Payer: OTHER GOVERNMENT

## 2022-10-11 ENCOUNTER — HOSPITAL ENCOUNTER (OUTPATIENT)
Dept: PHYSICAL THERAPY | Age: 47
Discharge: HOME | End: 2022-10-11
Payer: OTHER GOVERNMENT

## 2022-10-11 PROCEDURE — 97110 THERAPEUTIC EXERCISES: CPT | Performed by: PHYSICAL THERAPIST

## 2022-10-11 PROCEDURE — 97112 NEUROMUSCULAR REEDUCATION: CPT | Performed by: PHYSICAL THERAPIST

## 2022-10-11 PROCEDURE — 97140 MANUAL THERAPY 1/> REGIONS: CPT | Performed by: PHYSICAL THERAPIST

## 2022-10-11 NOTE — PROGRESS NOTES
PT DAILY TREATMENT NOTE 2-15    Patient Name: Pato Price  Date:10/11/2022  : 1975  [x]  Patient  Verified   Payor: STEVE / Plan: Shad Chowdary 74 / Product Type:  /    In time: 0610 PM Out time: 150 M  Total Treatment Time (min): 55  Visit #:  4    Treatment Area: Right knee pain [M25.561]    SUBJECTIVE  Pain Level (0-10 scale): 0/10 pain, pt reports she can more easily sit nicol cross apple sauce but is still feeling a little discomfort or restricted. Pt reports going up and down stairs has been fine. Any medication changes, allergies to medications, adverse drug reactions, diagnosis change, or new procedure performed?: [x] No    [] Yes (see summary sheet for update)  Subjective functional status/changes:   [] No changes reported  See above    OBJECTIVE    ROM:  knee flexion 151 deg.       Modality rationale: decrease edema, decrease inflammation, and decrease pain to improve the patients ability to perform ADL's   Min Type Additional Details       [] Estim: []Att   []Unatt    []TENS instruct                  []IFC  []Premod   []NMES                     []Other:  []w/US   []w/ice   []w/heat  Position:  Location:       []  Traction: [] Cervical       []Lumbar                       [] Prone          []Supine                       []Intermittent   []Continuous Lbs:  [] before manual  [] after manual  []w/heat    []  Ultrasound: []Continuous   [] Pulsed                       at: []1MHz   []3MHz Location:  W/cm2:    [] Paraffin         Location:   []w/heat    []  Ice     []  Heat  []  Ice massage Position:  Location:    []  Laser  []  Other: Position:  Location:   -   [x]  Vasopneumatic Device Pressure:       [] lo [x] med [] hi   Temperature: 37     [x] Skin assessment post-treatment:  [x]intact []redness- no adverse reaction    []redness - adverse reaction:         35 min Therapeutic Exercise:  [x] See flow sheet :      Rationale: increase ROM, increase strength, and improve coordination to improve the patients ability to ambulate, navigate stairs,        10 min Neuromuscular Re-education:  [x]  See flow sheet : standing on 1/2 foam for clocks, stability kicks on 1/2 foam  Plyo jumps, cues for soft landing and posterior weight shift  Run jump     Rationale: improve coordination, improve balance, and increase proprioception  to improve the patients ability to perform ADL's, ambulate and navigate stairs      10 min Manual Therapy:  inferior patella mobs in 20 deg flexion and approx. 70 deg flexion, gentle scar incision mobilization to arthroscopic incisions, also at end range knee flexion. Rationale: decrease pain, increase ROM, increase tissue extensibility, and decrease trigger points  to improve the patients ability to perform ADL's, ambulate and navigate stairs            With   [] TE   [] TA   [] Neuro   [] SC   [] other: Patient Education: [x] Review HEP    [] Progressed/Changed HEP based on:   [] positioning   [] body mechanics   [] transfers   [] heat/ice application    [] other:      Other Objective/Functional Measures: NT     Pain Level (0-10 scale) post treatment: 0/10    ASSESSMENT/Changes in Function:   Pt making excellent gains in PT, able to now sit nicol cross applesauce and improved knee flexion. Pt still with some mild pain    Patient will continue to benefit from skilled PT services to modify and progress therapeutic interventions, address functional mobility deficits, address ROM deficits, address strength deficits, analyze and address soft tissue restrictions, analyze and cue movement patterns, analyze and modify body mechanics/ergonomics, address imbalance/dizziness, and instruct in home and community integration to attain remaining goals. []  See Plan of Care  []  See progress note/recertification  []  See Discharge Summary         Short Term Goals:  To be accomplished in 2-3 weeks:              1) Pt independent in HEP from day 1              2) Pt will improve right knee flexion to 145 deg or more to be within 5 deg of left knee flexion and more easily sit cross legged eventually   3) Pt will be able to perform SLS for 30 sec without loss of balance / UE support needed for improved stability in stance   Long Term Goals: To be accomplished in 4-6 weeks:              1) Pt independent in final HEP. 2) Pt will be able to run 1-2 ' on, 1 ' off at least 8 reps without increased knee pain. 3) Pt will be able to ascend/descend 12 steps step over step without increased knee pain with normalized gait pattern. 4) Pt will be able to sit cross legged to be able to do work as pre-.        PLAN  []  Upgrade activities as tolerated     [x]  Continue plan of care  []  Update interventions per flow sheet       []  Discharge due to:_  []  Other:_      Teri Ronquillo, PT 10/11/2022

## 2022-10-13 ENCOUNTER — APPOINTMENT (OUTPATIENT)
Dept: PHYSICAL THERAPY | Age: 47
End: 2022-10-13
Payer: OTHER GOVERNMENT

## 2022-10-20 ENCOUNTER — HOSPITAL ENCOUNTER (OUTPATIENT)
Dept: PHYSICAL THERAPY | Age: 47
Discharge: HOME | End: 2022-10-20
Payer: OTHER GOVERNMENT

## 2022-10-20 PROCEDURE — 97112 NEUROMUSCULAR REEDUCATION: CPT | Performed by: PHYSICAL THERAPIST

## 2022-10-20 PROCEDURE — 97110 THERAPEUTIC EXERCISES: CPT | Performed by: PHYSICAL THERAPIST

## 2022-10-20 NOTE — PROGRESS NOTES
PT DAILY TREATMENT NOTE 2-15    Patient Name: Grover Brunner  Date:10/20/2022  : 1975  [x]  Patient  Verified   Payor:  / Plan: Shad Chowdary 74 / Product Type:  /    In time: 420 PM Out time: 515  Total Treatment Time (min): 55  Visit #:  5    Treatment Area: Right knee pain [M25.561]    SUBJECTIVE  Pain Level (0-10 scale): 0/10 pain, pt reports went back to Dr. Gilberto Olsen. Pt reports Dr. Gilberto Romo cleared her to run, gave her a run / walk program to initiate (2 ' run, 1 ' walk).       Any medication changes, allergies to medications, adverse drug reactions, diagnosis change, or new procedure performed?: [x] No    [] Yes (see summary sheet for update)  Subjective functional status/changes:   [] No changes reported  See above    OBJECTIVE      Modality rationale: decrease edema, decrease inflammation, and decrease pain to improve the patients ability to perform ADL's   Min Type Additional Details       [] Estim: []Att   []Unatt    []TENS instruct                  []IFC  []Premod   []NMES                     []Other:  []w/US   []w/ice   []w/heat  Position:  Location:       []  Traction: [] Cervical       []Lumbar                       [] Prone          []Supine                       []Intermittent   []Continuous Lbs:  [] before manual  [] after manual  []w/heat    []  Ultrasound: []Continuous   [] Pulsed                       at: []1MHz   []3MHz Location:  W/cm2:    [] Paraffin         Location:   []w/heat    []  Ice     []  Heat  []  Ice massage Position:  Location:    []  Laser  []  Other: Position:  Location:   -   [x]  Vasopneumatic Device Pressure:       [] lo [x] med [] hi   Temperature: 37     [x] Skin assessment post-treatment:  [x]intact []redness- no adverse reaction    []redness - adverse reaction:         45 min Therapeutic Exercise:  [x] See flow sheet :      Rationale: increase ROM, increase strength, and improve coordination to improve the patients ability to ambulate, navigate stairs,        10 min Neuromuscular Re-education:  [x]  See flow sheet :  stability kicks on 1/2 foam, use of red TB for recruitment of glute med. For rebounder ball toss 3x15      2 ' running on treadmill - wilber was 172, noting more upright posture versus slight forward lean and increased heel strike, cued for \"soft\" landing and slight increase in wilber. Rationale: improve coordination, improve balance, and increase proprioception  to improve the patients ability to perform ADL's, ambulate and navigate stairs      0 min Manual Therapy:   Rationale: decrease pain, increase ROM, increase tissue extensibility, and decrease trigger points  to improve the patients ability to perform ADL's, ambulate and navigate stairs            With   [] TE   [] TA   [] Neuro   [] SC   [] other: Patient Education: [x] Review HEP    [] Progressed/Changed HEP based on:   [] positioning   [] body mechanics   [] transfers   [] heat/ice application    [] other:      Other Objective/Functional Measures: NT     Pain Level (0-10 scale) post treatment: 0/10    ASSESSMENT/Changes in Function:   Pt doing well, able to initiate run/walk 2 ' run, 1 ' walk yesterday without increased symptoms. Pt noting fatigue with ther. Ex. Today. Patient will continue to benefit from skilled PT services to modify and progress therapeutic interventions, address functional mobility deficits, address ROM deficits, address strength deficits, analyze and address soft tissue restrictions, analyze and cue movement patterns, analyze and modify body mechanics/ergonomics, address imbalance/dizziness, and instruct in home and community integration to attain remaining goals. []  See Plan of Care  []  See progress note/recertification  []  See Discharge Summary         Short Term Goals: To be accomplished in 2-3 weeks:              1) Pt independent in HEP from day 1 progressing.                2) Pt will improve right knee flexion to 145 deg or more to be within 5 deg of left knee flexion and more easily sit cross legged eventually  MET  3) Pt will be able to perform SLS for 30 sec without loss of balance / UE support needed for improved stability in stance  progressing  Long Term Goals: To be accomplished in 4-6 weeks:              1) Pt independent in final HEP. 2) Pt will be able to run 1-2 ' on, 1 ' off at least 8 reps without increased knee pain. 3) Pt will be able to ascend/descend 12 steps step over step without increased knee pain with normalized gait pattern. 4) Pt will be able to sit cross legged to be able to do work as pre-. PLAN  []  Upgrade activities as tolerated     [x]  Continue plan of care  []  Update interventions per flow sheet       []  Discharge due to:_  [x]  Other:_re-assess, check goals.         Gema Monae, PT 10/20/2022

## 2022-10-27 ENCOUNTER — HOSPITAL ENCOUNTER (OUTPATIENT)
Dept: PHYSICAL THERAPY | Age: 47
Discharge: HOME | End: 2022-10-27
Payer: OTHER GOVERNMENT

## 2022-10-27 PROCEDURE — 97112 NEUROMUSCULAR REEDUCATION: CPT | Performed by: PHYSICAL THERAPIST

## 2022-10-27 PROCEDURE — 97110 THERAPEUTIC EXERCISES: CPT | Performed by: PHYSICAL THERAPIST

## 2022-10-27 NOTE — PROGRESS NOTES
PT discharge Summary /  DAILY TREATMENT NOTE 2-15    Patient Name: Sally Rivera  Date:10/27/2022  : 1975  [x]  Patient  Verified   Payor: STEVE / Plan: Shad Chowdary 75 / Product Type:  /    In time: 1230 PM Out time: 110  Total Treatment Time (min): 40  Visit #:  6    Treatment Area: Right knee pain [M25.561]    SUBJECTIVE  Pain Level (0-10 scale): 0/10 pain, pt reports she has been able to run 3' , walk 1 ' and has no knee pain. She can sit cross legged, go up and down stairs without pain. Any medication changes, allergies to medications, adverse drug reactions, diagnosis change, or new procedure performed?: [x] No    [] Yes (see summary sheet for update)  Subjective functional status/changes:   [] No changes reported  See above    OBJECTIVE    R knee AROM:  flexion 150 deg. Palpation:  no TTP surrounding incisions. Stairs: pt able to ascend/descend without pain. Strength:  Able to perform dynamic SL exercises with good stability and without pain.        Modality rationale: decrease edema, decrease inflammation, and decrease pain to improve the patients ability to perform ADL's   Min Type Additional Details       [] Estim: []Att   []Unatt    []TENS instruct                  []IFC  []Premod   []NMES                     []Other:  []w/US   []w/ice   []w/heat  Position:  Location:       []  Traction: [] Cervical       []Lumbar                       [] Prone          []Supine                       []Intermittent   []Continuous Lbs:  [] before manual  [] after manual  []w/heat    []  Ultrasound: []Continuous   [] Pulsed                       at: []1MHz   []3MHz Location:  W/cm2:    [] Paraffin         Location:   []w/heat    []  Ice     []  Heat  []  Ice massage Position:  Location:    []  Laser  []  Other: Position:  Location:   -   [x]  Vasopneumatic Device Pressure:       [] lo [x] med [] hi   Temperature: 37     [x] Skin assessment post-treatment:  [x]intact []redness- no adverse reaction    []redness - adverse reaction:         30 min Therapeutic Exercise:  [x] See flow sheet :     Review of all final HEP. Rationale: increase ROM, increase strength, and improve coordination to improve the patients ability to ambulate, navigate stairs,        10 min Neuromuscular Re-education:  [x]  See flow sheet :  stability kicks on 1/2 foam, use of red TB for recruitment of glute med. In SLS. Rationale: improve coordination, improve balance, and increase proprioception  to improve the patients ability to perform ADL's, ambulate and navigate stairs      0 min Manual Therapy:   Rationale: decrease pain, increase ROM, increase tissue extensibility, and decrease trigger points  to improve the patients ability to perform ADL's, ambulate and navigate stairs            With   [] TE   [] TA   [] Neuro   [] SC   [] other: Patient Education: [x] Review HEP    [] Progressed/Changed HEP based on:   [] positioning   [] body mechanics   [] transfers   [] heat/ice application    [] other:      Other Objective/Functional Measures: NT     Pain Level (0-10 scale) post treatment: 0/10    ASSESSMENT/Changes in Function:   Pt with 6 skilled PT sessions. Pt has met 100% of goals, pt is ready for D/C to HEP. []  See Plan of Care  []  See progress note/recertification  []  See Discharge Summary         Short Term Goals: To be accomplished in 2-3 weeks:              1) Pt independent in HEP from day 1 MET               2) Pt will improve right knee flexion to 145 deg or more to be within 5 deg of left knee flexion and more easily sit cross legged eventually  MET  3) Pt will be able to perform SLS for 30 sec without loss of balance / UE support needed for improved stability in stance  MET  Long Term Goals: To be accomplished in 4-6 weeks:              1) Pt independent in final HEP. MET               2) Pt will be able to run 1-2 ' on, 1 ' off at least 8 reps without increased knee pain.   MET 3) Pt will be able to ascend/descend 12 steps step over step without increased knee pain with normalized gait pattern. MET               4) Pt will be able to sit cross legged to be able to do work as pre-. MET    PLAN  []  Upgrade activities as tolerated     []  Continue plan of care  []  Update interventions per flow sheet       []  Discharge due to:_  [x]  Other:_D/C to HEP.      aKthi Piña, PT 10/27/2022

## 2022-12-01 NOTE — ANCILLARY DISCHARGE INSTRUCTIONS
PT discharge Summary /  DAILY TREATMENT NOTE 2-15     Patient Name: Shahid Thao  Date:10/27/2022  : 1975  [x]  Patient  Verified   Payor:  / Plan: dianboom / Product Type:  /    In time: 1230 PM Out time: 110  Total Treatment Time (min): 40  Visit #:  6     Treatment Area: Right knee pain [M25.561]     SUBJECTIVE  Pain Level (0-10 scale): 0/10 pain, pt reports she has been able to run 3' , walk 1 ' and has no knee pain. She can sit cross legged, go up and down stairs without pain. Any medication changes, allergies to medications, adverse drug reactions, diagnosis change, or new procedure performed?: [x] No    [] Yes (see summary sheet for update)  Subjective functional status/changes:   [] No changes reported  See above     OBJECTIVE     R knee AROM:  flexion 150 deg. Palpation:  no TTP surrounding incisions. Stairs: pt able to ascend/descend without pain. Strength:  Able to perform dynamic SL exercises with good stability and without pain.                 Modality rationale: decrease edema, decrease inflammation, and decrease pain to improve the patients ability to perform ADL's    Min Type Additional Details        [] Estim: []Att   []Unatt    []TENS instruct                  []IFC  []Premod   []NMES                     []Other:  []w/US   []w/ice   []w/heat  Position:  Location:        []  Traction: [] Cervical       []Lumbar                       [] Prone          []Supine                       []Intermittent   []Continuous Lbs:  [] before manual  [] after manual  []w/heat     []  Ultrasound: []Continuous   [] Pulsed                       at: []1MHz   []3MHz Location:  W/cm2:     [] Paraffin         Location:   []w/heat     []  Ice     []  Heat  []  Ice massage Position:  Location:     []  Laser  []  Other: Position:  Location:   -    [x]  Vasopneumatic Device Pressure:       [] lo [x] med [] hi   Temperature: 37      [x] Skin assessment post-treatment:  [x]intact []redness- no adverse reaction    []redness - adverse reaction:            30 min Therapeutic Exercise:  [x] See flow sheet :     Review of all final HEP. Rationale: increase ROM, increase strength, and improve coordination to improve the patients ability to ambulate, navigate stairs,         10 min Neuromuscular Re-education:  [x]  See flow sheet :  stability kicks on 1/2 foam, use of red TB for recruitment of glute med. In SLS. Rationale: improve coordination, improve balance, and increase proprioception  to improve the patients ability to perform ADL's, ambulate and navigate stairs        0 min Manual Therapy:   Rationale: decrease pain, increase ROM, increase tissue extensibility, and decrease trigger points  to improve the patients ability to perform ADL's, ambulate and navigate stairs                                                                    With   [] TE   [] TA   [] Neuro   [] SC   [] other: Patient Education: [x] Review HEP    [] Progressed/Changed HEP based on:   [] positioning   [] body mechanics   [] transfers   [] heat/ice application    [] other:       Other Objective/Functional Measures: NT      Pain Level (0-10 scale) post treatment: 0/10     ASSESSMENT/Changes in Function:   Pt with 6 skilled PT sessions. Pt has met 100% of goals, pt is ready for D/C to HEP. []  See Plan of Care  []  See progress note/recertification  []  See Discharge Summary         Short Term Goals: To be accomplished in 2-3 weeks:              1) Pt independent in HEP from day 1 MET               2) Pt will improve right knee flexion to 145 deg or more to be within 5 deg of left knee flexion and more easily sit cross legged eventually  MET  3) Pt will be able to perform SLS for 30 sec without loss of balance / UE support needed for improved stability in stance  MET  Long Term Goals: To be accomplished in 4-6 weeks:              1) Pt independent in final HEP.   MET 2) Pt will be able to run 1-2 ' on, 1 ' off at least 8 reps without increased knee pain. MET               3) Pt will be able to ascend/descend 12 steps step over step without increased knee pain with normalized gait pattern. MET               4) Pt will be able to sit cross legged to be able to do work as pre-. MET     PLAN  []  Upgrade activities as tolerated     []  Continue plan of care  []  Update interventions per flow sheet       []  Discharge due to:_  [x]  Other:_D/C to HEPMaldonado Nesbitt, PT   10/27/2022

## 2023-02-06 ENCOUNTER — ANESTHESIA (OUTPATIENT)
Dept: SURGERY | Age: 48
End: 2023-02-06
Payer: OTHER GOVERNMENT

## 2023-02-06 ENCOUNTER — HOSPITAL ENCOUNTER (OUTPATIENT)
Age: 48
Setting detail: OUTPATIENT SURGERY
Discharge: HOME | End: 2023-02-06
Attending: OTOLARYNGOLOGY | Admitting: OTOLARYNGOLOGY
Payer: OTHER GOVERNMENT

## 2023-02-06 ENCOUNTER — ANESTHESIA EVENT (OUTPATIENT)
Dept: SURGERY | Age: 48
End: 2023-02-06
Payer: OTHER GOVERNMENT

## 2023-02-06 VITALS
DIASTOLIC BLOOD PRESSURE: 72 MMHG | RESPIRATION RATE: 19 BRPM | WEIGHT: 127.87 LBS | BODY MASS INDEX: 20.64 KG/M2 | TEMPERATURE: 98.4 F | OXYGEN SATURATION: 100 % | SYSTOLIC BLOOD PRESSURE: 111 MMHG | HEART RATE: 98 BPM

## 2023-02-06 DIAGNOSIS — G89.18 POSTOPERATIVE PAIN: Primary | ICD-10-CM

## 2023-02-06 LAB — HCG UR QL: NEGATIVE

## 2023-02-06 PROCEDURE — 76060000063 HC AMB SURG ANES 1.5 TO 2 HR: Performed by: OTOLARYNGOLOGY

## 2023-02-06 PROCEDURE — 77030008528 HC TBNG IRR MIC/DEB MEDT -B: Performed by: OTOLARYNGOLOGY

## 2023-02-06 PROCEDURE — 2709999900 HC NON-CHARGEABLE SUPPLY: Performed by: OTOLARYNGOLOGY

## 2023-02-06 PROCEDURE — 77030040361 HC SLV COMPR DVT MDII -B: Performed by: OTOLARYNGOLOGY

## 2023-02-06 PROCEDURE — 77030040922 HC BLNKT HYPOTHRM STRY -A

## 2023-02-06 PROCEDURE — 74011250636 HC RX REV CODE- 250/636: Performed by: NURSE ANESTHETIST, CERTIFIED REGISTERED

## 2023-02-06 PROCEDURE — 77030041022 HC TRKR INSTR ENT NAV MEDT -C: Performed by: OTOLARYNGOLOGY

## 2023-02-06 PROCEDURE — 77030013520 HC DEV INFL BLN ACCL -B: Performed by: OTOLARYNGOLOGY

## 2023-02-06 PROCEDURE — 74011250637 HC RX REV CODE- 250/637: Performed by: OTOLARYNGOLOGY

## 2023-02-06 PROCEDURE — 77030026438 HC STYL ET INTUB CARD -A: Performed by: ANESTHESIOLOGY

## 2023-02-06 PROCEDURE — 74011000250 HC RX REV CODE- 250: Performed by: NURSE ANESTHETIST, CERTIFIED REGISTERED

## 2023-02-06 PROCEDURE — 77030002986 HC SUT PROL J&J -A: Performed by: OTOLARYNGOLOGY

## 2023-02-06 PROCEDURE — 74011000250 HC RX REV CODE- 250: Performed by: OTOLARYNGOLOGY

## 2023-02-06 PROCEDURE — 77030008684 HC TU ET CUF COVD -B: Performed by: ANESTHESIOLOGY

## 2023-02-06 PROCEDURE — 74011250636 HC RX REV CODE- 250/636: Performed by: ANESTHESIOLOGY

## 2023-02-06 PROCEDURE — 74011250637 HC RX REV CODE- 250/637: Performed by: ANESTHESIOLOGY

## 2023-02-06 PROCEDURE — 76210000035 HC AMBSU PH I REC 1 TO 1.5 HR: Performed by: OTOLARYNGOLOGY

## 2023-02-06 PROCEDURE — 77030036668 HC HEMSTAT APPL W/HEMADERM KT -BARD -F: Performed by: OTOLARYNGOLOGY

## 2023-02-06 PROCEDURE — 77030011647 HC PK NSL MEROCL MEDT -A: Performed by: OTOLARYNGOLOGY

## 2023-02-06 PROCEDURE — 76030000020 HC AMB SURG 1.5 TO 2 HR INTENSV-TIER 1: Performed by: OTOLARYNGOLOGY

## 2023-02-06 PROCEDURE — C1887 CATHETER, GUIDING: HCPCS | Performed by: OTOLARYNGOLOGY

## 2023-02-06 PROCEDURE — 81025 URINE PREGNANCY TEST: CPT

## 2023-02-06 RX ORDER — SODIUM CHLORIDE 0.9 % (FLUSH) 0.9 %
5-40 SYRINGE (ML) INJECTION EVERY 8 HOURS
Status: DISCONTINUED | OUTPATIENT
Start: 2023-02-06 | End: 2023-02-06 | Stop reason: HOSPADM

## 2023-02-06 RX ORDER — FENTANYL CITRATE 50 UG/ML
50 INJECTION, SOLUTION INTRAMUSCULAR; INTRAVENOUS AS NEEDED
Status: DISCONTINUED | OUTPATIENT
Start: 2023-02-06 | End: 2023-02-06 | Stop reason: HOSPADM

## 2023-02-06 RX ORDER — SODIUM CHLORIDE, SODIUM LACTATE, POTASSIUM CHLORIDE, CALCIUM CHLORIDE 600; 310; 30; 20 MG/100ML; MG/100ML; MG/100ML; MG/100ML
INJECTION, SOLUTION INTRAVENOUS
Status: DISCONTINUED | OUTPATIENT
Start: 2023-02-06 | End: 2023-02-06 | Stop reason: HOSPADM

## 2023-02-06 RX ORDER — ACETAMINOPHEN 325 MG/1
650 TABLET ORAL ONCE
Status: DISCONTINUED | OUTPATIENT
Start: 2023-02-06 | End: 2023-02-06 | Stop reason: HOSPADM

## 2023-02-06 RX ORDER — MIDAZOLAM HYDROCHLORIDE 1 MG/ML
1 INJECTION, SOLUTION INTRAMUSCULAR; INTRAVENOUS AS NEEDED
Status: DISCONTINUED | OUTPATIENT
Start: 2023-02-06 | End: 2023-02-06 | Stop reason: HOSPADM

## 2023-02-06 RX ORDER — MIDAZOLAM HYDROCHLORIDE 1 MG/ML
INJECTION, SOLUTION INTRAMUSCULAR; INTRAVENOUS AS NEEDED
Status: DISCONTINUED | OUTPATIENT
Start: 2023-02-06 | End: 2023-02-06 | Stop reason: HOSPADM

## 2023-02-06 RX ORDER — HYDROMORPHONE HYDROCHLORIDE 1 MG/ML
0.2 INJECTION, SOLUTION INTRAMUSCULAR; INTRAVENOUS; SUBCUTANEOUS
Status: DISCONTINUED | OUTPATIENT
Start: 2023-02-06 | End: 2023-02-06 | Stop reason: HOSPADM

## 2023-02-06 RX ORDER — LIDOCAINE HYDROCHLORIDE 20 MG/ML
INJECTION, SOLUTION EPIDURAL; INFILTRATION; INTRACAUDAL; PERINEURAL AS NEEDED
Status: DISCONTINUED | OUTPATIENT
Start: 2023-02-06 | End: 2023-02-06 | Stop reason: HOSPADM

## 2023-02-06 RX ORDER — FENTANYL CITRATE 50 UG/ML
INJECTION, SOLUTION INTRAMUSCULAR; INTRAVENOUS AS NEEDED
Status: DISCONTINUED | OUTPATIENT
Start: 2023-02-06 | End: 2023-02-06 | Stop reason: HOSPADM

## 2023-02-06 RX ORDER — AMOXICILLIN AND CLAVULANATE POTASSIUM 875; 125 MG/1; MG/1
1 TABLET, FILM COATED ORAL 2 TIMES DAILY
Qty: 20 TABLET | Refills: 0 | Status: SHIPPED | OUTPATIENT
Start: 2023-02-06 | End: 2023-02-16

## 2023-02-06 RX ORDER — ROCURONIUM BROMIDE 10 MG/ML
INJECTION, SOLUTION INTRAVENOUS AS NEEDED
Status: DISCONTINUED | OUTPATIENT
Start: 2023-02-06 | End: 2023-02-06 | Stop reason: HOSPADM

## 2023-02-06 RX ORDER — KETAMINE HYDROCHLORIDE 10 MG/ML
INJECTION INTRAMUSCULAR; INTRAVENOUS AS NEEDED
Status: DISCONTINUED | OUTPATIENT
Start: 2023-02-06 | End: 2023-02-06 | Stop reason: HOSPADM

## 2023-02-06 RX ORDER — LIDOCAINE HYDROCHLORIDE AND EPINEPHRINE 10; 10 MG/ML; UG/ML
INJECTION, SOLUTION INFILTRATION; PERINEURAL AS NEEDED
Status: DISCONTINUED | OUTPATIENT
Start: 2023-02-06 | End: 2023-02-06 | Stop reason: HOSPADM

## 2023-02-06 RX ORDER — SODIUM CHLORIDE 0.9 % (FLUSH) 0.9 %
5-40 SYRINGE (ML) INJECTION AS NEEDED
Status: DISCONTINUED | OUTPATIENT
Start: 2023-02-06 | End: 2023-02-06 | Stop reason: HOSPADM

## 2023-02-06 RX ORDER — PHENYLEPHRINE HCL IN 0.9% NACL 0.4MG/10ML
SYRINGE (ML) INTRAVENOUS AS NEEDED
Status: DISCONTINUED | OUTPATIENT
Start: 2023-02-06 | End: 2023-02-06 | Stop reason: HOSPADM

## 2023-02-06 RX ORDER — ONDANSETRON 2 MG/ML
INJECTION INTRAMUSCULAR; INTRAVENOUS AS NEEDED
Status: DISCONTINUED | OUTPATIENT
Start: 2023-02-06 | End: 2023-02-06 | Stop reason: HOSPADM

## 2023-02-06 RX ORDER — SODIUM CHLORIDE 9 MG/ML
25 INJECTION, SOLUTION INTRAVENOUS CONTINUOUS
Status: DISCONTINUED | OUTPATIENT
Start: 2023-02-06 | End: 2023-02-06 | Stop reason: HOSPADM

## 2023-02-06 RX ORDER — OXYMETAZOLINE HCL 0.05 %
SPRAY, NON-AEROSOL (ML) NASAL AS NEEDED
Status: DISCONTINUED | OUTPATIENT
Start: 2023-02-06 | End: 2023-02-06 | Stop reason: HOSPADM

## 2023-02-06 RX ORDER — SODIUM CHLORIDE, SODIUM LACTATE, POTASSIUM CHLORIDE, CALCIUM CHLORIDE 600; 310; 30; 20 MG/100ML; MG/100ML; MG/100ML; MG/100ML
125 INJECTION, SOLUTION INTRAVENOUS CONTINUOUS
Status: DISCONTINUED | OUTPATIENT
Start: 2023-02-06 | End: 2023-02-06 | Stop reason: HOSPADM

## 2023-02-06 RX ORDER — LIDOCAINE HYDROCHLORIDE 10 MG/ML
0.1 INJECTION, SOLUTION EPIDURAL; INFILTRATION; INTRACAUDAL; PERINEURAL AS NEEDED
Status: DISCONTINUED | OUTPATIENT
Start: 2023-02-06 | End: 2023-02-06 | Stop reason: HOSPADM

## 2023-02-06 RX ORDER — MIDAZOLAM HYDROCHLORIDE 1 MG/ML
0.5 INJECTION, SOLUTION INTRAMUSCULAR; INTRAVENOUS
Status: DISCONTINUED | OUTPATIENT
Start: 2023-02-06 | End: 2023-02-06 | Stop reason: HOSPADM

## 2023-02-06 RX ORDER — METHYLPREDNISOLONE 4 MG/1
TABLET ORAL
Qty: 1 DOSE PACK | Refills: 0 | Status: SHIPPED | OUTPATIENT
Start: 2023-02-06

## 2023-02-06 RX ORDER — PROPOFOL 10 MG/ML
INJECTION, EMULSION INTRAVENOUS AS NEEDED
Status: DISCONTINUED | OUTPATIENT
Start: 2023-02-06 | End: 2023-02-06 | Stop reason: HOSPADM

## 2023-02-06 RX ORDER — MORPHINE SULFATE 2 MG/ML
2 INJECTION, SOLUTION INTRAMUSCULAR; INTRAVENOUS
Status: DISCONTINUED | OUTPATIENT
Start: 2023-02-06 | End: 2023-02-06 | Stop reason: HOSPADM

## 2023-02-06 RX ORDER — EPINEPHRINE NASAL SOLUTION 1 MG/ML
SOLUTION NASAL AS NEEDED
Status: DISCONTINUED | OUTPATIENT
Start: 2023-02-06 | End: 2023-02-06 | Stop reason: HOSPADM

## 2023-02-06 RX ORDER — OXYMETAZOLINE HCL 0.05 %
2 SPRAY, NON-AEROSOL (ML) NASAL
Status: DISCONTINUED | OUTPATIENT
Start: 2023-02-06 | End: 2023-02-06 | Stop reason: HOSPADM

## 2023-02-06 RX ORDER — EPHEDRINE SULFATE/0.9% NACL/PF 50 MG/5 ML
SYRINGE (ML) INTRAVENOUS AS NEEDED
Status: DISCONTINUED | OUTPATIENT
Start: 2023-02-06 | End: 2023-02-06 | Stop reason: HOSPADM

## 2023-02-06 RX ORDER — SCOLOPAMINE TRANSDERMAL SYSTEM 1 MG/1
1 PATCH, EXTENDED RELEASE TRANSDERMAL ONCE
Status: DISCONTINUED | OUTPATIENT
Start: 2023-02-06 | End: 2023-02-06 | Stop reason: HOSPADM

## 2023-02-06 RX ORDER — DEXAMETHASONE SODIUM PHOSPHATE 4 MG/ML
INJECTION, SOLUTION INTRA-ARTICULAR; INTRALESIONAL; INTRAMUSCULAR; INTRAVENOUS; SOFT TISSUE AS NEEDED
Status: DISCONTINUED | OUTPATIENT
Start: 2023-02-06 | End: 2023-02-06 | Stop reason: HOSPADM

## 2023-02-06 RX ORDER — ONDANSETRON 2 MG/ML
4 INJECTION INTRAMUSCULAR; INTRAVENOUS AS NEEDED
Status: DISCONTINUED | OUTPATIENT
Start: 2023-02-06 | End: 2023-02-06 | Stop reason: HOSPADM

## 2023-02-06 RX ORDER — DEXMEDETOMIDINE HYDROCHLORIDE 100 UG/ML
INJECTION, SOLUTION INTRAVENOUS AS NEEDED
Status: DISCONTINUED | OUTPATIENT
Start: 2023-02-06 | End: 2023-02-06 | Stop reason: HOSPADM

## 2023-02-06 RX ORDER — CEFAZOLIN SODIUM 1 G/3ML
INJECTION, POWDER, FOR SOLUTION INTRAMUSCULAR; INTRAVENOUS AS NEEDED
Status: DISCONTINUED | OUTPATIENT
Start: 2023-02-06 | End: 2023-02-06 | Stop reason: HOSPADM

## 2023-02-06 RX ORDER — HYDROCODONE BITARTRATE AND ACETAMINOPHEN 5; 325 MG/1; MG/1
2 TABLET ORAL
Qty: 15 TABLET | Refills: 0 | Status: SHIPPED | OUTPATIENT
Start: 2023-02-06 | End: 2023-02-09

## 2023-02-06 RX ORDER — DIPHENHYDRAMINE HYDROCHLORIDE 50 MG/ML
12.5 INJECTION, SOLUTION INTRAMUSCULAR; INTRAVENOUS AS NEEDED
Status: DISCONTINUED | OUTPATIENT
Start: 2023-02-06 | End: 2023-02-06 | Stop reason: HOSPADM

## 2023-02-06 RX ORDER — ACETAMINOPHEN 325 MG/1
650 TABLET ORAL ONCE
Status: COMPLETED | OUTPATIENT
Start: 2023-02-06 | End: 2023-02-06

## 2023-02-06 RX ORDER — OXYCODONE AND ACETAMINOPHEN 5; 325 MG/1; MG/1
1 TABLET ORAL AS NEEDED
Status: DISCONTINUED | OUTPATIENT
Start: 2023-02-06 | End: 2023-02-06 | Stop reason: HOSPADM

## 2023-02-06 RX ORDER — SUCCINYLCHOLINE CHLORIDE 20 MG/ML
INJECTION INTRAMUSCULAR; INTRAVENOUS AS NEEDED
Status: DISCONTINUED | OUTPATIENT
Start: 2023-02-06 | End: 2023-02-06 | Stop reason: HOSPADM

## 2023-02-06 RX ORDER — FENTANYL CITRATE 50 UG/ML
25 INJECTION, SOLUTION INTRAMUSCULAR; INTRAVENOUS
Status: DISCONTINUED | OUTPATIENT
Start: 2023-02-06 | End: 2023-02-06 | Stop reason: HOSPADM

## 2023-02-06 RX ADMIN — FENTANYL CITRATE 25 MCG: 50 INJECTION, SOLUTION INTRAMUSCULAR; INTRAVENOUS at 15:51

## 2023-02-06 RX ADMIN — LIDOCAINE HYDROCHLORIDE 100 MG: 20 INJECTION, SOLUTION EPIDURAL; INFILTRATION; INTRACAUDAL; PERINEURAL at 13:35

## 2023-02-06 RX ADMIN — DEXMEDETOMIDINE HYDROCHLORIDE 4 MCG: 100 INJECTION, SOLUTION, CONCENTRATE INTRAVENOUS at 13:48

## 2023-02-06 RX ADMIN — Medication 80 MCG: at 14:27

## 2023-02-06 RX ADMIN — Medication 10 MG: at 14:18

## 2023-02-06 RX ADMIN — Medication 40 MCG: at 13:50

## 2023-02-06 RX ADMIN — FENTANYL CITRATE 25 MCG: 50 INJECTION, SOLUTION INTRAMUSCULAR; INTRAVENOUS at 15:41

## 2023-02-06 RX ADMIN — CEFAZOLIN 2 G: 330 INJECTION, POWDER, FOR SOLUTION INTRAMUSCULAR; INTRAVENOUS at 13:46

## 2023-02-06 RX ADMIN — Medication 80 MCG: at 14:39

## 2023-02-06 RX ADMIN — OXYMETAZOLINE HCL 2 SPRAY: 0.05 SPRAY NASAL at 13:05

## 2023-02-06 RX ADMIN — Medication 80 MCG: at 14:12

## 2023-02-06 RX ADMIN — DEXMEDETOMIDINE HYDROCHLORIDE 4 MCG: 100 INJECTION, SOLUTION, CONCENTRATE INTRAVENOUS at 13:45

## 2023-02-06 RX ADMIN — FENTANYL CITRATE 100 MCG: 50 INJECTION, SOLUTION INTRAMUSCULAR; INTRAVENOUS at 13:34

## 2023-02-06 RX ADMIN — ROCURONIUM BROMIDE 10 MG: 10 SOLUTION INTRAVENOUS at 13:35

## 2023-02-06 RX ADMIN — Medication 80 MCG: at 14:18

## 2023-02-06 RX ADMIN — ONDANSETRON HYDROCHLORIDE 4 MG: 2 INJECTION, SOLUTION INTRAMUSCULAR; INTRAVENOUS at 13:42

## 2023-02-06 RX ADMIN — DEXAMETHASONE SODIUM PHOSPHATE 8 MG: 4 INJECTION, SOLUTION INTRAMUSCULAR; INTRAVENOUS at 13:42

## 2023-02-06 RX ADMIN — SODIUM CHLORIDE, POTASSIUM CHLORIDE, SODIUM LACTATE AND CALCIUM CHLORIDE: 600; 310; 30; 20 INJECTION, SOLUTION INTRAVENOUS at 13:20

## 2023-02-06 RX ADMIN — ACETAMINOPHEN 650 MG: 325 TABLET ORAL at 12:38

## 2023-02-06 RX ADMIN — SODIUM CHLORIDE, POTASSIUM CHLORIDE, SODIUM LACTATE AND CALCIUM CHLORIDE 125 ML/HR: 600; 310; 30; 20 INJECTION, SOLUTION INTRAVENOUS at 12:58

## 2023-02-06 RX ADMIN — DEXMEDETOMIDINE HYDROCHLORIDE 4 MCG: 100 INJECTION, SOLUTION, CONCENTRATE INTRAVENOUS at 13:43

## 2023-02-06 RX ADMIN — ONDANSETRON HYDROCHLORIDE 4 MG: 2 INJECTION, SOLUTION INTRAMUSCULAR; INTRAVENOUS at 14:50

## 2023-02-06 RX ADMIN — Medication 40 MCG: at 14:00

## 2023-02-06 RX ADMIN — Medication 10 MG: at 14:04

## 2023-02-06 RX ADMIN — SUCCINYLCHOLINE CHLORIDE 100 MG: 20 INJECTION, SOLUTION INTRAMUSCULAR; INTRAVENOUS at 13:36

## 2023-02-06 RX ADMIN — FENTANYL CITRATE 25 MCG: 50 INJECTION, SOLUTION INTRAMUSCULAR; INTRAVENOUS at 16:03

## 2023-02-06 RX ADMIN — DEXMEDETOMIDINE HYDROCHLORIDE 4 MCG: 100 INJECTION, SOLUTION, CONCENTRATE INTRAVENOUS at 13:51

## 2023-02-06 RX ADMIN — OXYMETAZOLINE HCL 2 SPRAY: 0.05 SPRAY NASAL at 12:56

## 2023-02-06 RX ADMIN — OXYMETAZOLINE HCL 2 SPRAY: 0.05 SPRAY NASAL at 12:30

## 2023-02-06 RX ADMIN — Medication 5 MG: at 14:39

## 2023-02-06 RX ADMIN — Medication 30 MG: at 13:42

## 2023-02-06 RX ADMIN — PROPOFOL 150 MG: 10 INJECTION, EMULSION INTRAVENOUS at 13:35

## 2023-02-06 RX ADMIN — PROPOFOL 50 MG: 10 INJECTION, EMULSION INTRAVENOUS at 14:54

## 2023-02-06 RX ADMIN — MIDAZOLAM 2 MG: 1 INJECTION INTRAMUSCULAR; INTRAVENOUS at 13:27

## 2023-02-06 RX ADMIN — DEXMEDETOMIDINE HYDROCHLORIDE 4 MCG: 100 INJECTION, SOLUTION, CONCENTRATE INTRAVENOUS at 13:54

## 2023-02-06 NOTE — OP NOTES
[address] [facility]  OPERATIVE REPORT    Name:  Emmanuel Perez  MR#:  [id number]  :  1975  ACCOUNT #:  [de-identified]  DATE OF SERVICE:  2023    PREOPERATIVE DIAGNOSES:  1.  Bilateral chronic ethmoid sinusitis. 2.  Bilateral chronic sphenoid sinusitis. 3.  Bilateral chronic frontal sinusitis. POSTOPERATIVE DIAGNOSES:  1.  Bilateral chronic ethmoid sinusitis. 2.  Bilateral chronic sphenoid sinusitis. 3.  Bilateral chronic frontal sinusitis. PROCEDURES PERFORMED:  1.  Bilateral total ethmoidectomy. 2.  Bilateral sphenoid sinus exploration. 3.  Bilateral frontal sinus exploration. 4.  Use of intraoperative navigation, intracranial, extradural.    SURGEON:  Le Hoang MD    ASSISTANT:  None. ANESTHESIA:  General endotracheal.    COMPLICATIONS:  None. SPECIMENS REMOVED:  None. IMPLANTS:  None. ESTIMATED BLOOD LOSS:  10 mL. IV FLUIDS:  1 liter. DRAINS:  None. DISPOSITION:  PACU, then home. CONDITION:  Stable. FINDINGS:  There was not purulence or polyps today. There was some polypoid mucosal edema throughout the ethmoids, however. INDICATIONS:  The patient is a 60-year-old female with a history of chronic sinusitis presenting for surgery. PROCEDURE:  The patient was identified in the preoperative holding area where she underwent an Afrin regimen. She was brought to the operating room and placed in supine position. General anesthesia was induced and she was orotracheally intubated. A time-out was performed in which we identified her name, medical record number, and the proposed procedure. The [place] navigation system was registered and found to be accurate. It was used throughout the remainder of the case to identify my position relative to important anatomic landmarks. Using a 0-degree endoscope, I examined the right and left nasal cavities. I packed the nose with Merocel patties hydrated in topical adrenaline solution.   I injected a total of 3 mL of 1% lidocaine with 1:100,000 parts epinephrine into the middle turbinate on both sides. I waited 5 minutes and then I removed the patties. I started on the right side. I used the microdebrider to remove the ethmoid bulla and any septations within the anterior ethmoid sinuses until I was flush with the lamina papyracea laterally and skull base superiorly. I passed to the basal lamella of the middle turbinate and removed the posterior ethmoid septations. I incised the superior turbinate and removed the inferior half of it. I identified the ostium of the sphenoid sinus and widened it laterally to get a good view into the sphenoid. I then used a microdebrider to debride into the axilla of the middle turbinate removing any suprabullar cells. Using a 30-degree endoscope, I was able to visualize the frontal sinus outflow tract which I cannulated with an Acclarent balloon. I confirmed my position using transluminescence on the forehead and serially dilated the outflow tract. I then used upbiting Epi-Cut microdebrider to remove the septations near this outflow tract to get a good wide opening of the frontal sinus. I then moved to the left hand side where I did a similar procedure. I removed the ethmoid bulla and any suprabullar cells with a microdebrider and passed to the basal lamella of the middle turbinate to remove the posterior ethmoid septations as well. I identified the superior turbinate and removed the inferior half of it to identify the ostium to the sphenoid sinus on this left side. Of note, the sphenoid sinus was smaller on the left than it was on the right. I widened the opening by removing the anterior face laterally to get a good view into the sphenoid. I then turned attention to the frontals. I used a microdebrider to debride up into the axilla of the middle turbinate to get a good view into the frontal sinus outflow tract.   I cannulated this with a navigation seeker and confirmed my position using the navigation. I then removed the septations which were nearing the frontal sinus outflow tract until there was a wide view into the frontal sinus on this side. I then copiously irrigated all the sinuses and confirmed hemostasis. I placed NasoPore dressing in the middle meatus bilaterally after suctioning all blood and debris. I then turned care of the patient to my anesthesia colleague who weaned the anesthetic and extubated the patient. She was transferred to the PACU in stable condition.       Tammy Shetty MD      [id number]  D:  02/06/2023 15:00  T:  02/06/2023 17:03  JOB #:  [id number]

## 2023-02-06 NOTE — DISCHARGE INSTRUCTIONS
FUNCTIONAL ENDOSCOPIC SINUS SURGERY (FESS)    ACTIVITIES   For the first 24 hours after surgery, do not drive or operate dangerous machinery or power tools, drink alcoholic beverages, make any important personal or business decisions, or sign any legal documents. Limit your activity for the rest of the day. Do NOT engage in sports, heavy work or lifting for two weeks following surgery. Do NOT blow your nose for one week. If you feel the need to blow your nose, perform a saline irrigation instead. You may start to gently blow your nose one week after surgery. You may shower or tub bathe as you prefer. DIET   Start with cool clear liquids (water, apple juice, Pepsi, Coke, gingerale, Popsicles). Advance your diet as tolerated to a regular diet. AVOID HOT DRINKS like coffee, tea, and soup on the day of surgery. SPECIAL CARE NEEDED   You may change the nasal drip pad if it is soaked with drainage or blood. Expect moderate/ heavy drainage for the first 24 hours. This will gradually decrease over the next few days. You may stop using the nasal dressing when the drainage decreases/stops. Nasal drainage may increase with eating/drinking hot fluids, heavy activity, or vomiting and may contain dark or bright red blood. MEDICATIONS  1. Saline irrigation kit (start tomorrow). Irrigate your nose 3 times a day using 2 bottles of irrigation solution for each use (6 bottles a day). You may irrigate more frequently if you desire. The more frequently you irrigate your nose, the less crust will form in your nose. Use one packet of prepared salt mixture to 8 oz. of water OR mix ¼ tsp of sea salt or kosher salt and ¼ tsp of baking soda with 8 oz. of water. Use only saline for your irrigations after surgery. You will restart your pre-operative nasal medications after your first post-op clinic visit.     2. Nasal saline spray   Use 2 sprays into each nostril every 1-2 hours while awake or as often as you can. 3. Afrin Nasal Spray (oxymetazoline)   If you are congested, you may use 2 sprays into each nostril twice a day. USE ONLY FOR 3 DAYS AS THIS CAN BE ADDICTING TO YOUR NOSE.    4. Medrol Dose Pack or Steroid Taper   Begin the day after surgery. Use as directed. 5. Antibiotic: ______Augmentin__________. Begin the day after surgery. Use as directed until finished. Eat yogurt each morning or take a daily probiotic supplement (OTC) to prevent diarrhea. 6. Pain medication: ____Norco_______. Narcotic pain medication is often not necessary after sinus surgery, but you have been given a prescription in case you need it. DO NOT DRIVE, OPERATE DANGEROUS MACHINERY, OR DRINK  ALCOHOL WHILE TAKING NARCOTIC PAIN MEDICATION. 7. Extra Strength Tylenol (acetaminophen)   You may take 2 tablets every 6 hours as needed for pain. Do not take while still taking your narcotic pain medication. Do not exceed a cumulative daily dose of 4,000mg of Tylenol. You received tylenol (acetaminophen) during your procedure today at 12:30 pm. Please do not have any additional tylenol (acetaminophen) or tylenol containing products for 6 hours, or no sooner than 6:30 PM.     You have a scopolamine patch behind your left ear. This is to help prevent nausea. This patch can be worn for up to 3 days. The most common side effects are dry mouth/dry eyes. If you are not experiencing nausea and are experiencing side effects you may remove the patch sooner. Please remove the patch no later than Thursday, February 9, 2023. Be sure to dispose of patch where children or pets cannot access it, wash your hands thoroughly, and do not touch your eyes. IN CASE OF MINOR BLEEDING FROM YOUR NOSE   Keep your head elevated. Stay in a sitting position. Place ice packs or a cool cloth over the nose. Suck on ice chips or Popsicles. Pinch nose to apply pressure. WHEN TO CALL YOUR DOCTOR   A temperature greater than 101 F or 38 C. Swelling over the face (forehead, eyes, nose, or cheeks). Blurred vision. Stiff neck. Extreme drowsiness after the first day. Bleeding from the nose lasting for more than 15 minutes. Inability to urinate 8 hours after surgery. Vomiting lasting more than 4 hours. Any other symptoms which concern you. If you have any questions or concerns, please call our clinic. Call 911 if you have chest pain, shortness of breath, or any other medical emergency. FOLLOW UP:  Please call Dr. Gill Myles office and schedule a return appointment for one week from now. Take tylenol or a pain pill prior to that appointment because your nose will be debrided (cleaned with a suction) during that appointment. Zahra Morgan MD  [facility], [address] [place] [address]   (v) [phone] (e) [phone]

## 2023-02-06 NOTE — ANESTHESIA POSTPROCEDURE EVALUATION
Procedure(s):  ENDOSCOPIC SINUS SURGERY  WITH NAVIGATION. general    Anesthesia Post Evaluation        Patient participation: complete - patient participated  Level of consciousness: awake  Pain management: adequate  Airway patency: patent  Anesthetic complications: no  Cardiovascular status: hemodynamically stable  Respiratory status: acceptable  Hydration status: acceptable  Comments: The patient is ready for PACU discharge. Arlette Us DO                   Post anesthesia nausea and vomiting:  controlled      INITIAL Post-op Vital signs:   Vitals Value Taken Time   /77 02/06/23 1515   Temp 36.9 °C (98.4 °F) 02/06/23 1510   Pulse 101 02/06/23 1520   Resp 25 02/06/23 1520   SpO2 100 % 02/06/23 1520   Vitals shown include unvalidated device data.

## 2023-02-06 NOTE — PERIOP NOTES
I have reviewed discharge instructions with the parent - Leopold Southerly. The parent - Leopold Southerly verbalized understanding. All questions addressed at this time. A paper copy of these instructions have been given to the patient to take home.

## 2023-02-06 NOTE — BRIEF OP NOTE
Brief Postoperative Note    Patient: Jose De Jesus Callejas  YOB: 1975  MRN: [id number]    Date of Procedure: 2/6/2023     Pre-Op Diagnosis: chronic sinusitis    Post-Op Diagnosis: Same as preoperative diagnosis.       Procedure(s):  ENDOSCOPIC SINUS SURGERY  WITH NAVIGATION    Surgeon(s):  Maranda Chang MD    Surgical Assistant: None    Anesthesia: General     Estimated Blood Loss (mL): Minimal    Complications: None    Specimens: * No specimens in log *     Implants: * No implants in log *    Drains: * No LDAs found *    Findings: no polyps or purulence today    Electronically Signed by Alessio Dhaliwal MD on 2/6/2023 at 2:52 PM

## 2023-02-06 NOTE — H&P
OTOLARYNGOLOGY - HEAD AND NECK SURGERY HISTORY AND PHYSICAL    CC:   Sinus infection    HPI:     Jose De Jesus Callejas is a 56 y.o. female with a history of CRS presenting for revision surgery. No major changes since last visit in the office. Past Medical History:   Diagnosis Date    Asthma      Past Surgical History:   Procedure Laterality Date    HX KNEE ARTHROSCOPY Bilateral     Meniscus    HX OTHER SURGICAL  01/2021    sinus surgery     HX SEPTOPLASTY  01/2021     Current Facility-Administered Medications   Medication Dose Route Frequency    lactated Ringers infusion  125 mL/hr IntraVENous CONTINUOUS    0.9% sodium chloride infusion  25 mL/hr IntraVENous CONTINUOUS    sodium chloride (NS) flush 5-40 mL  5-40 mL IntraVENous Q8H    sodium chloride (NS) flush 5-40 mL  5-40 mL IntraVENous PRN    lidocaine (PF) (XYLOCAINE) 10 mg/mL (1 %) injection 0.1 mL  0.1 mL SubCUTAneous PRN    fentaNYL citrate (PF) injection 50 mcg  50 mcg IntraVENous PRN    midazolam (VERSED) injection 1 mg  1 mg IntraVENous PRN    scopolamine (TRANSDERM-SCOP) 1 mg over 3 days 1 Patch  1 Patch TransDERmal ONCE    oxymetazoline (AFRIN) 0.05 % nasal spray 2 Spray  2 Spray Both Nostrils Multiple      Allergies   Allergen Reactions    Corn Rash    Neosporin [Neomycin-Bacitracnzn-Polymyxnb] Hives    Tomato Rash     Family History   Problem Relation Age of Onset    Hypertension Mother     Elevated Lipids Mother     No Known Problems Father     Elevated Lipids Sister     Hypertension Sister      Social History     Tobacco Use    Smoking status: Never    Smokeless tobacco: Never   Vaping Use    Vaping Use: Never used   Substance Use Topics    Alcohol use: Yes     Comment: occasionally    Drug use: Never         REVIEW OF SYSTEMS  A full 10 point review of systems was performed today. The review was non-pertinent other than the details already listed in the history of present illness.      Visit Vitals  /71 (BP 1 Location: Left upper arm, BP Patient Position: At rest)   Pulse 66   Resp 18   Wt 58 kg (127 lb 13.9 oz)   LMP 01/21/2023 (Exact Date)   SpO2 100%   BMI 20.64 kg/m²        PHYSICAL EXAM  General:  No acute distress. Alert and oriented x 3. MSK:   Normal muscle bulk  Psych:  Mood and affect appropriate. Neuro:  CN II - XII grossly intact bilaterally. Eyes:  PERRL/EOMI, no nystagmus. ENT:   EACs are patent, clean and dry. TMs clear and intact with normal anatomic landmarks. No middle ear fluid. Anterior rhinoscopy without mucopurulence or polyps. OC/OP clear without masses or lesions. Lymph:  Neck soft and supple without lymphadenopathy. Resp:   No audible stridor or wheezing. Skin:   Head and neck skin is without suspicious lesions. ASSESSMENT/PLAN:  We reviewed the risks of surgery including need for additional surgery, bleeding, infection, CSF leak, eye injury, ongoing infection, need for additional treatments, and the general risks of anesthesia including death as explained by the anesthesiology team.  Written consent obtained for bilateral endoscopic sinus surgery with navigation. Will plan to proceed with procedure as discussed. Beka Grullon MD

## 2023-02-06 NOTE — ANESTHESIA PREPROCEDURE EVALUATION
Relevant Problems   No relevant active problems       Anesthetic History   No history of anesthetic complications            Review of Systems / Medical History  Patient summary reviewed, nursing notes reviewed and pertinent labs reviewed    Pulmonary            Asthma        Neuro/Psych   Within defined limits           Cardiovascular  Within defined limits                     GI/Hepatic/Renal  Within defined limits             Comments: [facility]/Other  Within defined limits           Other Findings              Physical Exam    Airway  Mallampati: II  TM Distance: 4 - 6 cm  Neck ROM: normal range of motion   Mouth opening: Normal     Cardiovascular    Rhythm: regular  Rate: normal         Dental  No notable dental hx       Pulmonary  Breath sounds clear to auscultation               Abdominal  Abdominal exam normal       Other Findings            Anesthetic Plan    ASA: 2  Anesthesia type: general            Anesthetic plan and risks discussed with: Patient

## 2023-02-06 NOTE — ROUTINE PROCESS
Patient: Jose De Jesus Callejas MRN: [id number]  SSN: [id number]   YOB: 1975  Age: 56 y.o. Sex: female     Patient is status post Procedure(s):  ENDOSCOPIC SINUS SURGERY  WITH NAVIGATION.     Surgeon(s) and Role:     * Maranda Chang MD - Primary    Local/Dose/Irrigation:                   Peripheral IV 02/06/23 Left;Posterior Wrist (Active)                           Dressing/Packing:  Incision 02/06/23 Nose-Dressing/Treatment:  (nasopore, nasal drip pad) (02/06/23 1500)    Splint/Cast:  ]    Other:

## 2023-03-13 ENCOUNTER — ANESTHESIA (OUTPATIENT)
Dept: ENDOSCOPY | Age: 48
End: 2023-03-13
Payer: OTHER GOVERNMENT

## 2023-03-13 ENCOUNTER — HOSPITAL ENCOUNTER (OUTPATIENT)
Age: 48
Setting detail: OUTPATIENT SURGERY
Discharge: HOME | End: 2023-03-13
Attending: INTERNAL MEDICINE | Admitting: INTERNAL MEDICINE
Payer: OTHER GOVERNMENT

## 2023-03-13 ENCOUNTER — ANESTHESIA EVENT (OUTPATIENT)
Dept: ENDOSCOPY | Age: 48
End: 2023-03-13
Payer: OTHER GOVERNMENT

## 2023-03-13 VITALS
HEART RATE: 61 BPM | TEMPERATURE: 98.6 F | BODY MASS INDEX: 21.83 KG/M2 | HEIGHT: 65 IN | SYSTOLIC BLOOD PRESSURE: 99 MMHG | OXYGEN SATURATION: 95 % | WEIGHT: 131 LBS | DIASTOLIC BLOOD PRESSURE: 68 MMHG | RESPIRATION RATE: 28 BRPM

## 2023-03-13 LAB — HCG UR QL: NEGATIVE

## 2023-03-13 PROCEDURE — 76040000007: Performed by: INTERNAL MEDICINE

## 2023-03-13 PROCEDURE — 77030013992 HC SNR POLYP ENDOSC BSC -B: Performed by: INTERNAL MEDICINE

## 2023-03-13 PROCEDURE — 76060000032 HC ANESTHESIA 0.5 TO 1 HR: Performed by: INTERNAL MEDICINE

## 2023-03-13 PROCEDURE — 2709999900 HC NON-CHARGEABLE SUPPLY: Performed by: INTERNAL MEDICINE

## 2023-03-13 PROCEDURE — 81025 URINE PREGNANCY TEST: CPT

## 2023-03-13 PROCEDURE — 74011250636 HC RX REV CODE- 250/636: Performed by: NURSE ANESTHETIST, CERTIFIED REGISTERED

## 2023-03-13 PROCEDURE — 74011000250 HC RX REV CODE- 250: Performed by: NURSE ANESTHETIST, CERTIFIED REGISTERED

## 2023-03-13 PROCEDURE — 77030021593 HC FCPS BIOP ENDOSC BSC -A: Performed by: INTERNAL MEDICINE

## 2023-03-13 PROCEDURE — 88305 TISSUE EXAM BY PATHOLOGIST: CPT

## 2023-03-13 RX ORDER — SODIUM CHLORIDE 0.9 % (FLUSH) 0.9 %
5-40 SYRINGE (ML) INJECTION AS NEEDED
Status: DISCONTINUED | OUTPATIENT
Start: 2023-03-13 | End: 2023-03-13 | Stop reason: HOSPADM

## 2023-03-13 RX ORDER — DEXTROMETHORPHAN/PSEUDOEPHED 2.5-7.5/.8
1.2 DROPS ORAL
Status: DISCONTINUED | OUTPATIENT
Start: 2023-03-13 | End: 2023-03-13 | Stop reason: HOSPADM

## 2023-03-13 RX ORDER — EPINEPHRINE 0.1 MG/ML
1 INJECTION INTRACARDIAC; INTRAVENOUS
Status: DISCONTINUED | OUTPATIENT
Start: 2023-03-13 | End: 2023-03-13 | Stop reason: HOSPADM

## 2023-03-13 RX ORDER — FENTANYL CITRATE 50 UG/ML
100 INJECTION, SOLUTION INTRAMUSCULAR; INTRAVENOUS
Status: DISCONTINUED | OUTPATIENT
Start: 2023-03-13 | End: 2023-03-13 | Stop reason: HOSPADM

## 2023-03-13 RX ORDER — SODIUM CHLORIDE 9 MG/ML
INJECTION, SOLUTION INTRAVENOUS
Status: DISCONTINUED | OUTPATIENT
Start: 2023-03-13 | End: 2023-03-13 | Stop reason: HOSPADM

## 2023-03-13 RX ORDER — LIDOCAINE HYDROCHLORIDE 20 MG/ML
INJECTION, SOLUTION EPIDURAL; INFILTRATION; INTRACAUDAL; PERINEURAL AS NEEDED
Status: DISCONTINUED | OUTPATIENT
Start: 2023-03-13 | End: 2023-03-13 | Stop reason: HOSPADM

## 2023-03-13 RX ORDER — PROPOFOL 10 MG/ML
INJECTION, EMULSION INTRAVENOUS AS NEEDED
Status: DISCONTINUED | OUTPATIENT
Start: 2023-03-13 | End: 2023-03-13 | Stop reason: HOSPADM

## 2023-03-13 RX ORDER — SODIUM CHLORIDE 0.9 % (FLUSH) 0.9 %
5-40 SYRINGE (ML) INJECTION EVERY 8 HOURS
Status: DISCONTINUED | OUTPATIENT
Start: 2023-03-13 | End: 2023-03-13 | Stop reason: HOSPADM

## 2023-03-13 RX ORDER — FLUMAZENIL 0.1 MG/ML
0.2 INJECTION INTRAVENOUS
Status: DISCONTINUED | OUTPATIENT
Start: 2023-03-13 | End: 2023-03-13 | Stop reason: HOSPADM

## 2023-03-13 RX ORDER — ATROPINE SULFATE 0.1 MG/ML
0.5 INJECTION INTRAVENOUS
Status: DISCONTINUED | OUTPATIENT
Start: 2023-03-13 | End: 2023-03-13 | Stop reason: HOSPADM

## 2023-03-13 RX ORDER — NALOXONE HYDROCHLORIDE 0.4 MG/ML
0.4 INJECTION, SOLUTION INTRAMUSCULAR; INTRAVENOUS; SUBCUTANEOUS
Status: DISCONTINUED | OUTPATIENT
Start: 2023-03-13 | End: 2023-03-13 | Stop reason: HOSPADM

## 2023-03-13 RX ORDER — SODIUM CHLORIDE 9 MG/ML
50 INJECTION, SOLUTION INTRAVENOUS CONTINUOUS
Status: DISCONTINUED | OUTPATIENT
Start: 2023-03-13 | End: 2023-03-13 | Stop reason: HOSPADM

## 2023-03-13 RX ORDER — MIDAZOLAM HYDROCHLORIDE 1 MG/ML
.25-1 INJECTION, SOLUTION INTRAMUSCULAR; INTRAVENOUS
Status: DISCONTINUED | OUTPATIENT
Start: 2023-03-13 | End: 2023-03-13 | Stop reason: HOSPADM

## 2023-03-13 RX ADMIN — PROPOFOL 100 MG: 10 INJECTION, EMULSION INTRAVENOUS at 09:05

## 2023-03-13 RX ADMIN — LIDOCAINE HYDROCHLORIDE 40 MG: 20 INJECTION, SOLUTION EPIDURAL; INFILTRATION; INTRACAUDAL; PERINEURAL at 08:54

## 2023-03-13 RX ADMIN — PROPOFOL 100 MG: 10 INJECTION, EMULSION INTRAVENOUS at 08:54

## 2023-03-13 RX ADMIN — PROPOFOL 100 MG: 10 INJECTION, EMULSION INTRAVENOUS at 09:16

## 2023-03-13 RX ADMIN — PROPOFOL 100 MG: 10 INJECTION, EMULSION INTRAVENOUS at 08:59

## 2023-03-13 RX ADMIN — PROPOFOL 100 MG: 10 INJECTION, EMULSION INTRAVENOUS at 09:01

## 2023-03-13 RX ADMIN — PROPOFOL 100 MG: 10 INJECTION, EMULSION INTRAVENOUS at 09:08

## 2023-03-13 RX ADMIN — SODIUM CHLORIDE: 900 INJECTION, SOLUTION INTRAVENOUS at 08:47

## 2023-03-13 RX ADMIN — PROPOFOL 50 MG: 10 INJECTION, EMULSION INTRAVENOUS at 09:18

## 2023-03-13 RX ADMIN — PROPOFOL 100 MG: 10 INJECTION, EMULSION INTRAVENOUS at 09:12

## 2023-03-13 NOTE — PROGRESS NOTES

## 2023-03-13 NOTE — ANESTHESIA PREPROCEDURE EVALUATION
Relevant Problems   No relevant active problems       Anesthetic History   No history of anesthetic complications            Review of Systems / Medical History  Patient summary reviewed, nursing notes reviewed and pertinent labs reviewed    Pulmonary            Asthma        Neuro/Psych   Within defined limits           Cardiovascular  Within defined limits                     GI/Hepatic/Renal  Within defined limits             Comments: [facility]/Other  Within defined limits           Other Findings              Physical Exam    Airway  Mallampati: II  TM Distance: 4 - 6 cm  Neck ROM: normal range of motion   Mouth opening: Normal     Cardiovascular    Rhythm: regular  Rate: normal         Dental  No notable dental hx       Pulmonary  Breath sounds clear to auscultation               Abdominal  Abdominal exam normal       Other Findings            Anesthetic Plan    ASA: 2  Anesthesia type: MAC            Anesthetic plan and risks discussed with: Patient

## 2023-03-13 NOTE — ANESTHESIA POSTPROCEDURE EVALUATION
Post-Anesthesia Evaluation and Assessment    Patient: Eva Tipton MRN: [id number]  SSN: [id number]    YOB: 1975  Age: 56 y.o. Sex: female      I have evaluated the patient and they are stable and ready for discharge from the PACU. Cardiovascular Function/Vital Signs  Visit Vitals  BP 99/68   Pulse 61   Temp 37 °C (98.6 °F)   Resp 28   Ht 5' 5\" (1.651 m)   Wt 59.4 kg (131 lb)   SpO2 95%   Breastfeeding No   BMI 21.80 kg/m²       Patient is status post MAC anesthesia for Procedure(s):  COLONOSCOPY  COLON BIOPSY  ENDOSCOPIC POLYPECTOMY. Nausea/Vomiting: None    Postoperative hydration reviewed and adequate. Pain:  Pain Scale 1: Numeric (0 - 10) (03/13/23 1005)  Pain Intensity 1: 0 (03/13/23 1005)   Managed    Neurological Status: At baseline    Mental Status, Level of Consciousness: Alert and  oriented to person, place, and time    Pulmonary Status:   O2 Device: None (Room air) (03/13/23 1005)   Adequate oxygenation and airway patent    Complications related to anesthesia: None    Post-anesthesia assessment completed. No concerns    Signed By: Abbott Moritz, MD     March 13, 2023              Procedure(s):  COLONOSCOPY  COLON BIOPSY  ENDOSCOPIC POLYPECTOMY. MAC    <BSHSIANPOST>    INITIAL Post-op Vital signs:   Vitals Value Taken Time   BP 99/68 03/13/23 1005   Temp 37 °C (98.6 °F) 03/13/23 0925   Pulse 63 03/13/23 1006   Resp 21 03/13/23 1006   SpO2 100 % 03/13/23 1006   Vitals shown include unvalidated device data.

## 2023-03-13 NOTE — DISCHARGE INSTRUCTIONS
[address]  [address] [facility] [address], [address] [facility]                                  Sadaf Robles  [id number]  1975    It was my pleasure seeing you for your procedure. You will also receive a summary report with the findings from this procedure and any further recommendations. If you had polyps removed or biopsies taken during your procedure, you will receive a separate letter from me within the next 2 weeks. If you don't receive this letter or if you have any questions, please call my office [phone]. Please take note of the post procedure instructions listed below. Conrad Vallejo,    Dr. Sats He    These instructions give you information on caring for yourself after your procedure. Call your doctor if you have any problems or questions after your procedure. HOME CARE  Walk if you have belly cramping or gas. Walking will help get rid of the air and reduce the bloated feeling in your belly (abdomen). Your IV site (where you received drugs) may be tender to touch. Place warm towels on the site; keep your arm up on two pillows if you have any swelling or soreness in the area. You may shower. ACTIVITY:  Take frequent rest periods and move at a slower pace for the next 24 hours. .  You may resume your regular activity tomorrow if you are feeling back to normal.  Do not drive or ride a bicycle for at least 24 hours (because of the medicine (anesthesia) used during the test). Do not sign any important legal documents or use or operate any machinery for 24 hours  Do not take sleeping medicines/nerve drugs for 24 hours unless the doctor tells you. You can return to work/school tomorrow unless otherwise instructed. NUTRITION:  Drink plenty of fluids to keep your pee (urine) clear or pale yellow  Begin with a light meal and progress to your normal diet.  Heavy or fried foods are harder to digest and may make you feel sick to your stomach (nauseated). Once you are feeling back to normal, you may resume your normal diet as instructed by your doctor. Avoid alcoholic beverages for 24 hours or as instructed. IF YOU HAD BIOPSIES TAKEN OR POLYPS REMOVED DURING THE PROCEDURE:  For the next 7 days, avoid all non-steroidal antiinflammatory medications such as Ibuprofen, Motrin, Advil, Alleve, Concetta-seltzer, Goody's powder, BC powder. If you do not have an heart condition that requires you to take a daily aspirin, you should avoid taking aspirin for 7 days. Eat a soft diet for 24 hours. Monitor your stools for any blood or dark black, tar-like, stools as this may be a sign of bleeding and if you see any blood, notify your doctor immediately. GET HELP RIGHT AWAY AND SEEK IMMEDIATE MEDICAL CARE IF:  You have more than a spotting of blood in your stool. You pass clumps of tissue (blood clots) or fill the toilet with blood. Your belly is painfully swollen or puffy (abdominal distention). You throw up (vomit). You have a fever. You have redness, pain or swelling at the IV site that last greater than two days. You have abdominal pain or discomfort that is severe or gets worse throughout the day. Post-procedure diagnosis:  1. Rectal polyps. 2. Ulcerative colitis    Post-procedure recommendations:    Findings:   Rectum: 40 mm polypoid (sessile) polyp with distinct borders involving distal rectum extending to anal verge (involving appx 60 % of mucosa in distal rectum). Distal border appears to terminate immediately above small hemorrhoidal column in anal canal. Polyp is soft without any nodularity appreciated on rectal exam. Small biopsies obtained. Remainder of rectum is normal (biopsied)  Sigmoid: Mucosa with subtle diffuse increased vascularity, biopsied.  One 3 mm sessile polyp, removed with cold snare  Descending Colon: Mucosa with subtle diffuse increased vascularity, biopsied  Transverse Colon: Mucosa with subtle diffuse increased vascularity, biopsied  Ascending Colon: Mucosa with subtle diffuse increased vascularity, biopsied, fibrous debris - lavaged   Cecum: Mucosa with subtle diffuse increased vascularity, biopsied, fibrous debris - lavaged   Terminal Ileum: Normal mucosa, biopsied       Recommendations:   - Await pathology. I performed thorough colonoscopy today under white light with periodic transitions and exam under NBI. There were a few areas with small amounts of fibrous debris, though this was lavaged and I do not think anything > 5 mm would be missed. - Patient last underwent colonoscopy December, 2021. At this time report notes one 3 mm polyp in sigmoid colon (removed - hyperplastic), mild pan-colitis (though biopsies with normal mucosa), small internal hemorrhoids on retroflexion. Patient currently denies any symptoms on mesalamine and calprotectin last year was normal.   - I will arrange clinic follow up to discuss next steps. Learning About Coronavirus [phone])  Coronavirus [phone]): Overview  What is coronavirus (COVID-19)? The coronavirus disease (COVID-19) is caused by a virus. It is an illness that was first found in [place], [place], in December 2019. It has since spread worldwide. The virus can cause fever, cough, and trouble breathing. In severe cases, it can cause pneumonia and make it hard to breathe without help. It can cause death. Coronaviruses are a large group of viruses. They cause the common cold. They also cause more serious illnesses like Middle East respiratory syndrome (MERS) and severe acute respiratory syndrome (SARS). COVID-19 is caused by a novel coronavirus. That means it's a new type that has not been seen in people before. This virus spreads person-to-person through droplets from coughing and sneezing. It can also spread when you are close to someone who is infected. And it can spread when you touch something that has the virus on it, such as a doorknob or a tabletop.   What can you do to protect yourself from coronavirus (COVID-19)? The best way to protect yourself from getting sick is to: Avoid areas where there is an outbreak. Avoid contact with people who may be infected. Wash your hands often with soap or alcohol-based hand sanitizers. Avoid crowds and try to stay at least 6 feet away from other people. Wash your hands often, especially after you cough or sneeze. Use soap and water, and scrub for at least 20 seconds. If soap and water aren't available, use an alcohol-based hand . Avoid touching your mouth, nose, and eyes. What can you do to avoid spreading the virus to others? To help avoid spreading the virus to others:  Cover your mouth with a tissue when you cough or sneeze. Then throw the tissue in the trash. Use a disinfectant to clean things that you touch often. Stay home if you are sick or have been exposed to the virus. Don't go to school, work, or public areas. And don't use public transportation. If you are sick:  Leave your home only if you need to get medical care. But call the doctor's office first so they know you're coming. And wear a face mask, if you have one. If you have a face mask, wear it whenever you're around other people. It can help stop the spread of the virus when you cough or sneeze. Clean and disinfect your home every day. Use household  and disinfectant wipes or sprays. Take special care to clean things that you grab with your hands. These include doorknobs, remote controls, phones, and handles on your refrigerator and microwave. And don't forget countertops, tabletops, bathrooms, and computer keyboards. When to call for help  Call 911 anytime you think you may need emergency care. For example, call if:  You have severe trouble breathing. (You can't talk at all.)  You have constant chest pain or pressure. You are severely dizzy or lightheaded. You are confused or can't think clearly.   Your face and lips have a blue color. You pass out (lose consciousness) or are very hard to wake up. Call your doctor now if you develop symptoms such as:  Shortness of breath. Fever. Cough. If you need to get care, call ahead to the doctor's office for instructions before you go. Make sure you wear a face mask, if you have one, to prevent exposing other people to the virus. Where can you get the latest information? The following health organizations are tracking and studying this virus. Their websites contain the most up-to-date information. Johann Plasencia also learn what to do if you think you may have been exposed to the virus. U.S. Centers for Disease Control and Prevention (CDC): The CDC provides updated news about the disease and travel advice. The website also tells you how to prevent the spread of infection. www.cdc.gov  World Health Organization [facility]): WHO offers information about the virus outbreaks. WHO also has travel advice. www.who.int  Current as of: April 1, 2020               Content Version: 12.4  © 2006-2020 Healthwise, Incorporated. Care instructions adapted under license by your healthcare professional. If you have questions about a medical condition or this instruction, always ask your healthcare professional. [address] any warranty or liability for your use of this information.

## 2023-03-13 NOTE — PROCEDURES
118 [facility]  217 [facility] [address], [address]  [phone]                              Colonoscopy Procedure Note      Indications:   Mild pan-colitis, surveillance       :  Beto Madera MD    Staff: Endoscopy Technician-1: Kamini Kidney  Endoscopy RN-1: Stephane Diamond RN    Referring Provider: León Tolliver DO    Sedation:  MAC anesthesia    Procedure Details:  After informed consent was obtained with all risks and benefits of procedure explained and preoperative exam completed, the patient was taken to the endoscopy suite and placed in the left lateral decubitus position. Upon sequential sedation as per above, a digital rectal exam was performed per below. The Olympus videocolonoscope was inserted in the rectum and carefully advanced to the terminal ileum. The quality of preparation was fair. [place] Bowel Prep Score : 2/2/2. The colonoscope was slowly withdrawn with careful evaluation between folds. Retroflexion in the rectum was performed. Findings:   Rectum: 40 mm polypoid (sessile) polyp with distinct borders involving distal rectum extending to anal verge (involving appx 60 % of mucosa in distal rectum sparing left lateral region). Distal border appears to terminate immediately above small hemorrhoidal column in anal canal. Polyp is soft without any nodularity appreciated on rectal exam. Small biopsies obtained. Remainder of rectum is normal (biopsied)  Sigmoid: Mucosa with subtle diffuse increased vascularity, biopsied.  One 3 mm sessile polyp, removed with cold snare  Descending Colon: Mucosa with subtle diffuse increased vascularity, biopsied  Transverse Colon: Mucosa with subtle diffuse increased vascularity, biopsied  Ascending Colon: Mucosa with subtle diffuse increased vascularity, biopsied, fibrous debris - lavaged   Cecum: Mucosa with subtle diffuse increased vascularity, biopsied, fibrous debris - lavaged   Terminal Ileum: Normal mucosa, biopsied     Interventions:  biopsies and polypectomy     Specimen Removed:    ID Type Source Tests Collected by Time Destination   1 : Ileal biopsy Preservative Ileum  Girish Bonilla MD 3/13/2023 9768 Pathology   2 : Right colon biopsy Preservative Colon, Right  Girish Bonilla MD 3/13/2023 0901 Pathology   3 : Transverse colon biopsy Preservative Colon, Transverse  Girish Bonilla MD 3/13/2023 4318 Pathology   4 : Left colon biopsy Preservative Colon, Left  Girish Bonilla MD 3/13/2023 1364 Pathology   5 : Sigmoid colon polyp Preservative Sigmoid  Girish Bonilla MD 3/13/2023 0912 Pathology   6 : Rectal biopsy Preservative Rectal  Girish Bonilla MD 3/13/2023 0915 Pathology   7 : Rectal polyp biopsy Preservative Rectal  Girish Bonilla MD 3/13/2023 3519 Pathology       Complications: None. EBL:  minimal     Impression:    See Postoperative diagnosis above    Recommendations:   - Await pathology. I performed thorough colonoscopy today under white light with periodic transitions and exam under NBI. There were a few areas with small amounts of fibrous debris, though this was lavaged and I do not think anything > 5 mm would be missed. - Patient last underwent colonoscopy December, 2021. At this time report notes one 3 mm polyp in sigmoid colon (removed - hyperplastic), mild pan-colitis (though biopsies with normal mucosa), small internal hemorrhoids on retroflexion. Patient currently denies any symptoms on mesalamine and calprotectin last year was normal.   - I will arrange clinic follow up to discuss next steps. Discharge Disposition:  Home in the company of a  when able to ambulate.     Gabby Cowan MD  3/13/2023  9:27 AM

## 2023-03-13 NOTE — H&P
118 [facility].  217 [facility] 140 [facility], [address]  [phone]                                History and Physical     NAME: Malon Lesches   :  1975   MRN:  [id number]     HPI:  The patient was seen and examined. Past Surgical History:   Procedure Laterality Date    HX HERNIA REPAIR      HX KNEE ARTHROSCOPY Bilateral     Meniscus    HX OTHER SURGICAL  2021    sinus surgery     HX SEPTOPLASTY  2021     Past Medical History:   Diagnosis Date    Asthma     Ulcerative colitis ([address]      Social History     Tobacco Use    Smoking status: Never    Smokeless tobacco: Never   Vaping Use    Vaping Use: Never used   Substance Use Topics    Alcohol use: Yes     Comment: occasionally    Drug use: Never     Allergies   Allergen Reactions    Corn Rash    Neosporin [Neomycin-Bacitracnzn-Polymyxnb] Hives    Tomato Rash     Family History   Problem Relation Age of Onset    Hypertension Mother     Elevated Lipids Mother     No Known Problems Father     Elevated Lipids Sister     Hypertension Sister     Colon Cancer Paternal Aunt      Current Facility-Administered Medications   Medication Dose Route Frequency    0.9% sodium chloride infusion  50 mL/hr IntraVENous CONTINUOUS    sodium chloride (NS) flush 5-40 mL  5-40 mL IntraVENous Q8H    sodium chloride (NS) flush 5-40 mL  5-40 mL IntraVENous PRN    midazolam (VERSED) injection 0.25-10 mg  0.25-10 mg IntraVENous Multiple    fentaNYL citrate (PF) injection 100 mcg  100 mcg IntraVENous MULTIPLE DOSE GIVEN    naloxone (NARCAN) injection 0.4 mg  0.4 mg IntraVENous Multiple    flumazeniL (ROMAZICON) 0.1 mg/mL injection 0.2 mg  0.2 mg IntraVENous Multiple    simethicone (MYLICON) 55SK/8.2TC oral drops 80 mg  1.2 mL Oral Multiple    atropine injection 0.5 mg  0.5 mg IntraVENous ONCE PRN    EPINEPHrine (ADRENALIN) 0.1 mg/mL syringe 1 mg  1 mg Endoscopically ONCE PRN         PHYSICAL EXAM:  General: WD, WN.  Alert, cooperative, no acute distress HEENT: NC, Atraumatic. PERRLA, EOMI. Anicteric sclerae. Lungs:  CTA Bilaterally. No Wheezing/Rhonchi/Rales. Heart:  Regular  rhythm,  No murmur, No Rubs, No Gallops  Abdomen: Soft, Non distended, Non tender. +Bowel sounds, no HSM  Extremities: No c/c/e  Neurologic:  CN 2-12 gi, Alert and oriented X 3. No acute neurological distress   Psych:   Good insight. Not anxious nor agitated. The heart, lungs and mental status were satisfactory for the administration of MAC sedation and for the procedure. Mallampati score: 2     The patient was counseled at length about the risks of jessica Covid-19 in the zulema-operative and post-operative states including the recovery window of their procedure. The patient was made aware that jessica Covid-19 after a surgical procedure may worsen their prognosis for recovering from the virus and lend to a higher morbidity and or mortality risk. The patient was given the options of postponing their procedure. All of the risks, benefits, and alternatives were discussed. The patient does wish to proceed with the procedure.       Assessment:   UC    Plan:   Endoscopic procedure colonoscopy  MAC sedation

## 2023-05-22 RX ORDER — MESALAMINE 800 MG/1
TABLET, DELAYED RELEASE ORAL
COMMUNITY
Start: 2015-11-30

## 2023-05-22 RX ORDER — FEXOFENADINE HCL 180 MG/1
TABLET ORAL
COMMUNITY
Start: 2017-12-12

## 2023-05-22 RX ORDER — LEVONORGESTREL AND ETHINYL ESTRADIOL 0.1-0.02MG
1 KIT ORAL DAILY
COMMUNITY
Start: 2022-06-23

## 2023-06-28 ENCOUNTER — ANESTHESIA (OUTPATIENT)
Facility: HOSPITAL | Age: 48
End: 2023-06-28
Payer: COMMERCIAL

## 2023-06-28 ENCOUNTER — HOSPITAL ENCOUNTER (OUTPATIENT)
Facility: HOSPITAL | Age: 48
Setting detail: OUTPATIENT SURGERY
Discharge: HOME | End: 2023-06-28
Attending: INTERNAL MEDICINE | Admitting: INTERNAL MEDICINE
Payer: COMMERCIAL

## 2023-06-28 ENCOUNTER — ANESTHESIA EVENT (OUTPATIENT)
Facility: HOSPITAL | Age: 48
End: 2023-06-28
Payer: COMMERCIAL

## 2023-06-28 VITALS
WEIGHT: 137 LBS | DIASTOLIC BLOOD PRESSURE: 73 MMHG | OXYGEN SATURATION: 100 % | TEMPERATURE: 98.7 F | BODY MASS INDEX: 22.8 KG/M2 | RESPIRATION RATE: 18 BRPM | HEART RATE: 67 BPM | SYSTOLIC BLOOD PRESSURE: 113 MMHG

## 2023-06-28 PROCEDURE — 3600007502: Performed by: INTERNAL MEDICINE

## 2023-06-28 PROCEDURE — 6360000002 HC RX W HCPCS: Performed by: NURSE ANESTHETIST, CERTIFIED REGISTERED

## 2023-06-28 PROCEDURE — 2580000003 HC RX 258: Performed by: INTERNAL MEDICINE

## 2023-06-28 PROCEDURE — 2500000003 HC RX 250 WO HCPCS: Performed by: NURSE ANESTHETIST, CERTIFIED REGISTERED

## 2023-06-28 PROCEDURE — 3700000000 HC ANESTHESIA ATTENDED CARE: Performed by: INTERNAL MEDICINE

## 2023-06-28 PROCEDURE — 3700000001 HC ADD 15 MINUTES (ANESTHESIA): Performed by: INTERNAL MEDICINE

## 2023-06-28 PROCEDURE — 88305 TISSUE EXAM BY PATHOLOGIST: CPT

## 2023-06-28 PROCEDURE — 7100000010 HC PHASE II RECOVERY - FIRST 15 MIN: Performed by: INTERNAL MEDICINE

## 2023-06-28 PROCEDURE — 7100000011 HC PHASE II RECOVERY - ADDTL 15 MIN: Performed by: INTERNAL MEDICINE

## 2023-06-28 PROCEDURE — 2709999900 HC NON-CHARGEABLE SUPPLY: Performed by: INTERNAL MEDICINE

## 2023-06-28 PROCEDURE — 3600007512: Performed by: INTERNAL MEDICINE

## 2023-06-28 PROCEDURE — C1713 ANCHOR/SCREW BN/BN,TIS/BN: HCPCS | Performed by: INTERNAL MEDICINE

## 2023-06-28 RX ORDER — SODIUM CHLORIDE 0.9 % (FLUSH) 0.9 %
5-40 SYRINGE (ML) INJECTION PRN
Status: DISCONTINUED | OUTPATIENT
Start: 2023-06-28 | End: 2023-06-28 | Stop reason: HOSPADM

## 2023-06-28 RX ORDER — SODIUM CHLORIDE 0.9 % (FLUSH) 0.9 %
5-40 SYRINGE (ML) INJECTION EVERY 12 HOURS SCHEDULED
Status: DISCONTINUED | OUTPATIENT
Start: 2023-06-28 | End: 2023-06-28 | Stop reason: HOSPADM

## 2023-06-28 RX ORDER — LIDOCAINE HYDROCHLORIDE 20 MG/ML
INJECTION, SOLUTION EPIDURAL; INFILTRATION; INTRACAUDAL; PERINEURAL PRN
Status: DISCONTINUED | OUTPATIENT
Start: 2023-06-28 | End: 2023-06-28 | Stop reason: SDUPTHER

## 2023-06-28 RX ORDER — SODIUM CHLORIDE 9 MG/ML
25 INJECTION, SOLUTION INTRAVENOUS PRN
Status: DISCONTINUED | OUTPATIENT
Start: 2023-06-28 | End: 2023-06-28 | Stop reason: HOSPADM

## 2023-06-28 RX ORDER — SODIUM CHLORIDE 9 MG/ML
INJECTION, SOLUTION INTRAVENOUS CONTINUOUS
Status: DISCONTINUED | OUTPATIENT
Start: 2023-06-28 | End: 2023-06-28 | Stop reason: HOSPADM

## 2023-06-28 RX ORDER — GLYCOPYRROLATE 0.2 MG/ML
INJECTION INTRAMUSCULAR; INTRAVENOUS PRN
Status: DISCONTINUED | OUTPATIENT
Start: 2023-06-28 | End: 2023-06-28 | Stop reason: SDUPTHER

## 2023-06-28 RX ORDER — PENICILLIN V POTASSIUM 500 MG/1
500 TABLET ORAL 4 TIMES DAILY
COMMUNITY

## 2023-06-28 RX ORDER — SODIUM CHLORIDE 9 MG/ML
INJECTION, SOLUTION INTRAVENOUS CONTINUOUS PRN
Status: COMPLETED | OUTPATIENT
Start: 2023-06-28 | End: 2023-06-28

## 2023-06-28 RX ORDER — PHENYLEPHRINE HCL IN 0.9% NACL 0.4MG/10ML
SYRINGE (ML) INTRAVENOUS PRN
Status: DISCONTINUED | OUTPATIENT
Start: 2023-06-28 | End: 2023-06-28 | Stop reason: SDUPTHER

## 2023-06-28 RX ADMIN — PROPOFOL 50 MG: 10 INJECTION, EMULSION INTRAVENOUS at 14:06

## 2023-06-28 RX ADMIN — PROPOFOL 50 MG: 10 INJECTION, EMULSION INTRAVENOUS at 14:19

## 2023-06-28 RX ADMIN — PROPOFOL 50 MG: 10 INJECTION, EMULSION INTRAVENOUS at 13:48

## 2023-06-28 RX ADMIN — PROPOFOL 50 MG: 10 INJECTION, EMULSION INTRAVENOUS at 14:01

## 2023-06-28 RX ADMIN — Medication 80 MCG: at 13:58

## 2023-06-28 RX ADMIN — PROPOFOL 50 MG: 10 INJECTION, EMULSION INTRAVENOUS at 14:21

## 2023-06-28 RX ADMIN — Medication 80 MCG: at 14:17

## 2023-06-28 RX ADMIN — PROPOFOL 50 MG: 10 INJECTION, EMULSION INTRAVENOUS at 14:23

## 2023-06-28 RX ADMIN — PROPOFOL 50 MG: 10 INJECTION, EMULSION INTRAVENOUS at 13:54

## 2023-06-28 RX ADMIN — SODIUM CHLORIDE: 9 INJECTION, SOLUTION INTRAVENOUS at 14:04

## 2023-06-28 RX ADMIN — PROPOFOL 50 MG: 10 INJECTION, EMULSION INTRAVENOUS at 14:17

## 2023-06-28 RX ADMIN — PROPOFOL 50 MG: 10 INJECTION, EMULSION INTRAVENOUS at 13:57

## 2023-06-28 RX ADMIN — PROPOFOL 50 MG: 10 INJECTION, EMULSION INTRAVENOUS at 14:10

## 2023-06-28 RX ADMIN — PROPOFOL 50 MG: 10 INJECTION, EMULSION INTRAVENOUS at 14:05

## 2023-06-28 RX ADMIN — Medication 80 MCG: at 14:19

## 2023-06-28 RX ADMIN — PROPOFOL 100 MG: 10 INJECTION, EMULSION INTRAVENOUS at 13:43

## 2023-06-28 RX ADMIN — PROPOFOL 50 MG: 10 INJECTION, EMULSION INTRAVENOUS at 13:53

## 2023-06-28 RX ADMIN — Medication 80 MCG: at 14:10

## 2023-06-28 RX ADMIN — SODIUM CHLORIDE: 9 INJECTION, SOLUTION INTRAVENOUS at 13:35

## 2023-06-28 RX ADMIN — PROPOFOL 50 MG: 10 INJECTION, EMULSION INTRAVENOUS at 13:44

## 2023-06-28 RX ADMIN — Medication 80 MCG: at 14:31

## 2023-06-28 RX ADMIN — PROPOFOL 50 MG: 10 INJECTION, EMULSION INTRAVENOUS at 14:07

## 2023-06-28 RX ADMIN — PROPOFOL 50 MG: 10 INJECTION, EMULSION INTRAVENOUS at 14:11

## 2023-06-28 RX ADMIN — PROPOFOL 50 MG: 10 INJECTION, EMULSION INTRAVENOUS at 13:49

## 2023-06-28 RX ADMIN — PROPOFOL 50 MG: 10 INJECTION, EMULSION INTRAVENOUS at 13:58

## 2023-06-28 RX ADMIN — Medication 80 MCG: at 13:50

## 2023-06-28 RX ADMIN — PROPOFOL 50 MG: 10 INJECTION, EMULSION INTRAVENOUS at 14:02

## 2023-06-28 RX ADMIN — PROPOFOL 50 MG: 10 INJECTION, EMULSION INTRAVENOUS at 13:52

## 2023-06-28 RX ADMIN — GLYCOPYRROLATE 0.2 MG: 0.2 INJECTION INTRAMUSCULAR; INTRAVENOUS at 14:19

## 2023-06-28 RX ADMIN — PROPOFOL 50 MG: 10 INJECTION, EMULSION INTRAVENOUS at 14:04

## 2023-06-28 RX ADMIN — LIDOCAINE HYDROCHLORIDE 50 MG: 20 INJECTION, SOLUTION EPIDURAL; INFILTRATION; INTRACAUDAL; PERINEURAL at 13:43

## 2023-06-28 RX ADMIN — PROPOFOL 50 MG: 10 INJECTION, EMULSION INTRAVENOUS at 14:13

## 2023-06-28 RX ADMIN — PROPOFOL 50 MG: 10 INJECTION, EMULSION INTRAVENOUS at 13:59

## 2023-06-28 RX ADMIN — PROPOFOL 50 MG: 10 INJECTION, EMULSION INTRAVENOUS at 13:46

## 2023-06-28 RX ADMIN — PROPOFOL 50 MG: 10 INJECTION, EMULSION INTRAVENOUS at 14:15

## 2023-06-28 RX ADMIN — PROPOFOL 50 MG: 10 INJECTION, EMULSION INTRAVENOUS at 13:51

## 2023-06-28 RX ADMIN — PROPOFOL 50 MG: 10 INJECTION, EMULSION INTRAVENOUS at 13:56

## 2023-06-28 RX ADMIN — Medication 80 MCG: at 14:07

## 2023-06-28 RX ADMIN — Medication 80 MCG: at 14:03

## 2023-06-28 RX ADMIN — Medication 120 MCG: at 14:25

## 2023-06-28 RX ADMIN — PROPOFOL 50 MG: 10 INJECTION, EMULSION INTRAVENOUS at 14:09

## 2023-06-28 RX ADMIN — Medication 80 MCG: at 14:15

## 2023-06-28 RX ADMIN — PROPOFOL 50 MG: 10 INJECTION, EMULSION INTRAVENOUS at 14:00

## 2023-06-28 ASSESSMENT — PAIN SCALES - GENERAL: PAINLEVEL_OUTOF10: 2

## 2023-08-02 ENCOUNTER — OFFICE VISIT (OUTPATIENT)
Dept: PRIMARY CARE CLINIC | Facility: CLINIC | Age: 48
End: 2023-08-02
Payer: OTHER GOVERNMENT

## 2023-08-02 VITALS
HEIGHT: 65 IN | RESPIRATION RATE: 17 BRPM | HEART RATE: 63 BPM | BODY MASS INDEX: 21.66 KG/M2 | DIASTOLIC BLOOD PRESSURE: 73 MMHG | TEMPERATURE: 97.6 F | WEIGHT: 130 LBS | SYSTOLIC BLOOD PRESSURE: 107 MMHG | OXYGEN SATURATION: 100 %

## 2023-08-02 DIAGNOSIS — Z00.00 WELL WOMAN EXAM (NO GYNECOLOGICAL EXAM): ICD-10-CM

## 2023-08-02 DIAGNOSIS — Z00.00 WELL WOMAN EXAM (NO GYNECOLOGICAL EXAM): Primary | ICD-10-CM

## 2023-08-02 PROCEDURE — 99396 PREV VISIT EST AGE 40-64: CPT | Performed by: FAMILY MEDICINE

## 2023-08-02 SDOH — ECONOMIC STABILITY: HOUSING INSECURITY
IN THE LAST 12 MONTHS, WAS THERE A TIME WHEN YOU DID NOT HAVE A STEADY PLACE TO SLEEP OR SLEPT IN A SHELTER (INCLUDING NOW)?: PATIENT REFUSED

## 2023-08-02 SDOH — ECONOMIC STABILITY: INCOME INSECURITY: HOW HARD IS IT FOR YOU TO PAY FOR THE VERY BASICS LIKE FOOD, HOUSING, MEDICAL CARE, AND HEATING?: PATIENT DECLINED

## 2023-08-02 SDOH — ECONOMIC STABILITY: FOOD INSECURITY: WITHIN THE PAST 12 MONTHS, YOU WORRIED THAT YOUR FOOD WOULD RUN OUT BEFORE YOU GOT MONEY TO BUY MORE.: PATIENT DECLINED

## 2023-08-02 SDOH — ECONOMIC STABILITY: FOOD INSECURITY: WITHIN THE PAST 12 MONTHS, THE FOOD YOU BOUGHT JUST DIDN'T LAST AND YOU DIDN'T HAVE MONEY TO GET MORE.: PATIENT DECLINED

## 2023-08-02 ASSESSMENT — PATIENT HEALTH QUESTIONNAIRE - PHQ9
SUM OF ALL RESPONSES TO PHQ QUESTIONS 1-9: 0
SUM OF ALL RESPONSES TO PHQ QUESTIONS 1-9: 0
2. FEELING DOWN, DEPRESSED OR HOPELESS: 0
SUM OF ALL RESPONSES TO PHQ QUESTIONS 1-9: 0
SUM OF ALL RESPONSES TO PHQ9 QUESTIONS 1 & 2: 0
1. LITTLE INTEREST OR PLEASURE IN DOING THINGS: 0
SUM OF ALL RESPONSES TO PHQ QUESTIONS 1-9: 0

## 2023-08-02 NOTE — PROGRESS NOTES
HPI:  Junie Schulz is a 56 y.o. female presenting for well woman exam.     She saw Derm in Jan for a skin lesion removal. Developed some scar tissue over it that has not improved. Red in color. Does not look like original lesion. Not getting bigger. Does not bleed. Followed by Dr. Adelso Lincoln. No hx of prior keloids. Diet: normal   Exercise: goes to [employer] a few times a week, runs 2-3 x weekly  Tobacco Use: never  Alcohol Use: socially  Desires STD testing: no    Allergies- reviewed: Allergies   Allergen Reactions    Neosporin [Bacitracin-Polymyxin B] Hives    Corn Oil Rash    Tomato Rash       Medications- reviewed:   Current Outpatient Medications   Medication Sig    fexofenadine (ALLEGRA) 180 MG tablet Allegra Allergy 180 mg tablet    levonorgestrel-ethinyl estradiol (AVIANE;ALESSE;LESSINA) 0.1-20 MG-MCG per tablet Take 1 tablet by mouth daily    mesalamine (DELZICOL) 800 MG TBEC TBEC tablet Asacol  mg tablet,delayed release   Prescribed by non [facility] MD    penicillin v potassium (VEETID) 500 MG tablet Take 1 tablet by mouth 4 times daily     No current facility-administered medications for this visit.          Past Medical History- reviewed:  Past Medical History:   Diagnosis Date    Asthma     Ulcerative colitis ([address])          Past Surgical History- reviewed:   Past Surgical History:   Procedure Laterality Date    COLONOSCOPY N/A 3/13/2023    COLONOSCOPY performed by Harshad Scales MD at [facility] ENDOSCOPY    COLONOSCOPY N/A 6/28/2023    COLONOSCOPY, EMR performed by Eleanor Gannon MD at [facility] ENDOSCOPY    COLONOSCOPY N/A 6/28/2023    COLONOSCOPY CONTROL HEMORRHAGE performed by Eleanor Gannon MD at [facility] ENDOSCOPY    COLONOSCOPY N/A 6/28/2023    COLONOSCOPY POLYPECTOMY HOT BIOPSY performed by Eleanor Gannon MD at [id number]  [facility] ARTHROSCOPY Bilateral     Meniscus    MEDICATION INJECTION N/A 6/28/2023    INJECTION MEDICATION performed by Eleanor Gannon MD at [facility]

## 2023-08-02 NOTE — PROGRESS NOTES
Chief Complaint   Patient presents with    Annual Exam       There were no vitals taken for this visit. 1. Have you been to the ER, urgent care clinic since your last visit? Hospitalized since your last visit? No    2. Have you seen or consulted any other health care providers outside of the [address] [place] [address] since your last visit? Include any pap smears or colon screening. No      3. For patients aged 43-73: Has the patient had a colonoscopy / FIT/ Cologuard? yes      If the patient is female:    4. For patients aged 43-66: Has the patient had a mammogram within the past 2 years? Yes      5. For patients aged 21-65: Has the patient had a pap smear?  Yes

## 2023-08-03 LAB
ALBUMIN SERPL-MCNC: 3.7 G/DL (ref 3.5–5)
ALBUMIN/GLOB SERPL: 1 (ref 1.1–2.2)
ALP SERPL-CCNC: 50 U/L (ref 45–117)
ALT SERPL-CCNC: 19 U/L (ref 12–78)
ANION GAP SERPL CALC-SCNC: 7 MMOL/L (ref 5–15)
AST SERPL-CCNC: 18 U/L (ref 15–37)
BILIRUB SERPL-MCNC: 0.3 MG/DL (ref 0.2–1)
BUN SERPL-MCNC: 13 MG/DL (ref 6–20)
BUN/CREAT SERPL: 16 (ref 12–20)
CALCIUM SERPL-MCNC: 9.2 MG/DL (ref 8.5–10.1)
CHLORIDE SERPL-SCNC: 106 MMOL/L (ref 97–108)
CHOLEST SERPL-MCNC: 214 MG/DL
CO2 SERPL-SCNC: 27 MMOL/L (ref 21–32)
CREAT SERPL-MCNC: 0.79 MG/DL (ref 0.55–1.02)
ERYTHROCYTE [DISTWIDTH] IN BLOOD BY AUTOMATED COUNT: 12.9 % (ref 11.5–14.5)
GLOBULIN SER CALC-MCNC: 3.6 G/DL (ref 2–4)
GLUCOSE SERPL-MCNC: 84 MG/DL (ref 65–100)
HCT VFR BLD AUTO: 40.1 % (ref 35–47)
HDLC SERPL-MCNC: 63 MG/DL
HDLC SERPL: 3.4 (ref 0–5)
HGB BLD-MCNC: 12.9 G/DL (ref 11.5–16)
LDLC SERPL CALC-MCNC: 136.2 MG/DL (ref 0–100)
MCH RBC QN AUTO: 29.2 PG (ref 26–34)
MCHC RBC AUTO-ENTMCNC: 32.2 G/DL (ref 30–36.5)
MCV RBC AUTO: 90.7 FL (ref 80–99)
NRBC # BLD: 0 K/UL (ref 0–0.01)
NRBC BLD-RTO: 0 PER 100 WBC
PLATELET # BLD AUTO: 307 K/UL (ref 150–400)
PMV BLD AUTO: 10.2 FL (ref 8.9–12.9)
POTASSIUM SERPL-SCNC: 4.4 MMOL/L (ref 3.5–5.1)
PROT SERPL-MCNC: 7.3 G/DL (ref 6.4–8.2)
RBC # BLD AUTO: 4.42 M/UL (ref 3.8–5.2)
SODIUM SERPL-SCNC: 140 MMOL/L (ref 136–145)
TRIGL SERPL-MCNC: 74 MG/DL
VLDLC SERPL CALC-MCNC: 14.8 MG/DL
WBC # BLD AUTO: 4.7 K/UL (ref 3.6–11)

## 2023-10-11 ENCOUNTER — ANESTHESIA EVENT (OUTPATIENT)
Facility: HOSPITAL | Age: 48
End: 2023-10-11
Payer: OTHER GOVERNMENT

## 2023-10-11 ENCOUNTER — ANESTHESIA (OUTPATIENT)
Facility: HOSPITAL | Age: 48
End: 2023-10-11
Payer: OTHER GOVERNMENT

## 2023-10-11 ENCOUNTER — HOSPITAL ENCOUNTER (OUTPATIENT)
Facility: HOSPITAL | Age: 48
Setting detail: OUTPATIENT SURGERY
Discharge: HOME | End: 2023-10-11
Attending: INTERNAL MEDICINE | Admitting: INTERNAL MEDICINE
Payer: OTHER GOVERNMENT

## 2023-10-11 VITALS
OXYGEN SATURATION: 100 % | SYSTOLIC BLOOD PRESSURE: 100 MMHG | BODY MASS INDEX: 21.66 KG/M2 | RESPIRATION RATE: 19 BRPM | HEIGHT: 65 IN | WEIGHT: 130 LBS | DIASTOLIC BLOOD PRESSURE: 56 MMHG | TEMPERATURE: 97.6 F | HEART RATE: 57 BPM

## 2023-10-11 LAB — HCG UR QL: NEGATIVE

## 2023-10-11 PROCEDURE — 3700000000 HC ANESTHESIA ATTENDED CARE: Performed by: INTERNAL MEDICINE

## 2023-10-11 PROCEDURE — 81025 URINE PREGNANCY TEST: CPT

## 2023-10-11 PROCEDURE — 7100000011 HC PHASE II RECOVERY - ADDTL 15 MIN: Performed by: INTERNAL MEDICINE

## 2023-10-11 PROCEDURE — 6360000002 HC RX W HCPCS: Performed by: NURSE ANESTHETIST, CERTIFIED REGISTERED

## 2023-10-11 PROCEDURE — 3700000001 HC ADD 15 MINUTES (ANESTHESIA): Performed by: INTERNAL MEDICINE

## 2023-10-11 PROCEDURE — 7100000010 HC PHASE II RECOVERY - FIRST 15 MIN: Performed by: INTERNAL MEDICINE

## 2023-10-11 PROCEDURE — 3600007502: Performed by: INTERNAL MEDICINE

## 2023-10-11 PROCEDURE — 88305 TISSUE EXAM BY PATHOLOGIST: CPT

## 2023-10-11 PROCEDURE — 2709999900 HC NON-CHARGEABLE SUPPLY: Performed by: INTERNAL MEDICINE

## 2023-10-11 PROCEDURE — 3600007512: Performed by: INTERNAL MEDICINE

## 2023-10-11 PROCEDURE — 2500000003 HC RX 250 WO HCPCS: Performed by: NURSE ANESTHETIST, CERTIFIED REGISTERED

## 2023-10-11 PROCEDURE — 2580000003 HC RX 258: Performed by: NURSE ANESTHETIST, CERTIFIED REGISTERED

## 2023-10-11 RX ORDER — SODIUM CHLORIDE 0.9 % (FLUSH) 0.9 %
5-40 SYRINGE (ML) INJECTION PRN
Status: DISCONTINUED | OUTPATIENT
Start: 2023-10-11 | End: 2023-10-11 | Stop reason: HOSPADM

## 2023-10-11 RX ORDER — SODIUM CHLORIDE 9 MG/ML
INJECTION, SOLUTION INTRAVENOUS CONTINUOUS PRN
Status: DISCONTINUED | OUTPATIENT
Start: 2023-10-11 | End: 2023-10-11 | Stop reason: SDUPTHER

## 2023-10-11 RX ORDER — PHENYLEPHRINE HCL IN 0.9% NACL 0.4MG/10ML
SYRINGE (ML) INTRAVENOUS PRN
Status: DISCONTINUED | OUTPATIENT
Start: 2023-10-11 | End: 2023-10-11 | Stop reason: SDUPTHER

## 2023-10-11 RX ORDER — SODIUM CHLORIDE 9 MG/ML
25 INJECTION, SOLUTION INTRAVENOUS PRN
Status: DISCONTINUED | OUTPATIENT
Start: 2023-10-11 | End: 2023-10-11 | Stop reason: HOSPADM

## 2023-10-11 RX ORDER — SODIUM CHLORIDE 0.9 % (FLUSH) 0.9 %
5-40 SYRINGE (ML) INJECTION EVERY 12 HOURS SCHEDULED
Status: DISCONTINUED | OUTPATIENT
Start: 2023-10-11 | End: 2023-10-11 | Stop reason: HOSPADM

## 2023-10-11 RX ORDER — SODIUM CHLORIDE 9 MG/ML
INJECTION, SOLUTION INTRAVENOUS CONTINUOUS
Status: DISCONTINUED | OUTPATIENT
Start: 2023-10-11 | End: 2023-10-11 | Stop reason: HOSPADM

## 2023-10-11 RX ORDER — LIDOCAINE HYDROCHLORIDE 20 MG/ML
INJECTION, SOLUTION EPIDURAL; INFILTRATION; INTRACAUDAL; PERINEURAL PRN
Status: DISCONTINUED | OUTPATIENT
Start: 2023-10-11 | End: 2023-10-11 | Stop reason: SDUPTHER

## 2023-10-11 RX ORDER — NICOTINE 14MG/24HR
PATCH, TRANSDERMAL 24 HOURS TRANSDERMAL
COMMUNITY

## 2023-10-11 RX ADMIN — SODIUM CHLORIDE: 9 INJECTION, SOLUTION INTRAVENOUS at 08:38

## 2023-10-11 RX ADMIN — PROPOFOL 50 MG: 10 INJECTION, EMULSION INTRAVENOUS at 09:18

## 2023-10-11 RX ADMIN — Medication 80 MCG: at 09:19

## 2023-10-11 RX ADMIN — Medication 80 MCG: at 09:14

## 2023-10-11 RX ADMIN — PROPOFOL 50 MG: 10 INJECTION, EMULSION INTRAVENOUS at 09:04

## 2023-10-11 RX ADMIN — PROPOFOL 50 MG: 10 INJECTION, EMULSION INTRAVENOUS at 09:12

## 2023-10-11 RX ADMIN — PROPOFOL 25 MG: 10 INJECTION, EMULSION INTRAVENOUS at 09:14

## 2023-10-11 RX ADMIN — PROPOFOL 25 MG: 10 INJECTION, EMULSION INTRAVENOUS at 09:09

## 2023-10-11 RX ADMIN — PROPOFOL 25 MG: 10 INJECTION, EMULSION INTRAVENOUS at 09:05

## 2023-10-11 RX ADMIN — PROPOFOL 25 MG: 10 INJECTION, EMULSION INTRAVENOUS at 09:07

## 2023-10-11 RX ADMIN — LIDOCAINE HYDROCHLORIDE 40 MG: 20 INJECTION, SOLUTION EPIDURAL; INFILTRATION; INTRACAUDAL; PERINEURAL at 09:03

## 2023-10-11 RX ADMIN — PROPOFOL 25 MG: 10 INJECTION, EMULSION INTRAVENOUS at 09:11

## 2023-10-11 RX ADMIN — PROPOFOL 25 MG: 10 INJECTION, EMULSION INTRAVENOUS at 09:08

## 2023-10-11 RX ADMIN — PROPOFOL 50 MG: 10 INJECTION, EMULSION INTRAVENOUS at 09:16

## 2023-10-11 RX ADMIN — PROPOFOL 50 MG: 10 INJECTION, EMULSION INTRAVENOUS at 09:03

## 2023-10-11 NOTE — DISCHARGE INSTRUCTIONS
(COVID-19)? The coronavirus disease (COVID-19) is caused by a virus. It is an illness that was first found in [place], [place], in December 2019. It has since spread worldwide. The virus can cause fever, cough, and trouble breathing. In severe cases, it can cause pneumonia and make it hard to breathe without help. It can cause death. Coronaviruses are a large group of viruses. They cause the common cold. They also cause more serious illnesses like Middle East respiratory syndrome (MERS) and severe acute respiratory syndrome (SARS). COVID-19 is caused by a novel coronavirus. That means it's a new type that has not been seen in people before. This virus spreads person-to-person through droplets from coughing and sneezing. It can also spread when you are close to someone who is infected. And it can spread when you touch something that has the virus on it, such as a doorknob or a tabletop. What can you do to protect yourself from coronavirus (COVID-19)? The best way to protect yourself from getting sick is to: Avoid areas where there is an outbreak. Avoid contact with people who may be infected. Wash your hands often with soap or alcohol-based hand sanitizers. Avoid crowds and try to stay at least 6 feet away from other people. Wash your hands often, especially after you cough or sneeze. Use soap and water, and scrub for at least 20 seconds. If soap and water aren't available, use an alcohol-based hand . Avoid touching your mouth, nose, and eyes. What can you do to avoid spreading the virus to others? To help avoid spreading the virus to others:  Cover your mouth with a tissue when you cough or sneeze. Then throw the tissue in the trash. Use a disinfectant to clean things that you touch often. Stay home if you are sick or have been exposed to the virus. Don't go to school, work, or public areas. And don't use public transportation.   If you are sick:  Leave your home only if you need to get medical

## 2023-10-11 NOTE — H&P
[address]  [phone]                                History and Physical     NAME: Abdulkadir Briceño   :  1975   MRN:  [id number]     HPI:  The patient was seen and examined. Past Surgical History:   Procedure Laterality Date    COLONOSCOPY N/A 3/13/2023    COLONOSCOPY performed by Charlotte Gonzalez MD at [facility] ENDOSCOPY    COLONOSCOPY N/A 2023    COLONOSCOPY, EMR performed by Hanna Mckeon MD at [facility] ENDOSCOPY    COLONOSCOPY N/A 2023    COLONOSCOPY CONTROL HEMORRHAGE performed by Hanna Mckeon MD at [facility] ENDOSCOPY    COLONOSCOPY N/A 2023    COLONOSCOPY POLYPECTOMY HOT BIOPSY performed by Hanna Mckeon MD at   [facility] ARTHROSCOPY Bilateral     Meniscus    MEDICATION INJECTION N/A 2023    INJECTION MEDICATION performed by Hanna Mckeon MD at [address]  2021    sinus surgery     SEPTOPLASTY  2021     Past Medical History:   Diagnosis Date    Asthma     Ulcerative colitis ([facility])      Social History     Tobacco Use    Smoking status: Never    Smokeless tobacco: Never   Substance Use Topics    Alcohol use: Yes     Alcohol/week: 1.0 standard drink of alcohol     Types: 1 Drinks containing 0.5 oz of alcohol per week    Drug use: Never     Allergies   Allergen Reactions    Neosporin [Bacitracin-Polymyxin B] Hives    Corn Oil Rash    Tomato Rash     Family History   Problem Relation Age of Onset    Hypertension Sister     Colon Cancer Paternal Aunt     Elevated Lipids Sister     No Known Problems Father     Elevated Lipids Mother     Hypertension Mother      No current facility-administered medications for this encounter. PHYSICAL EXAM:  General: WD, WN. Alert, cooperative, no acute distress    HEENT: NC, Atraumatic. PERRLA, EOMI. Anicteric sclerae. Lungs:  CTA Bilaterally. No Wheezing/Rhonchi/Rales.   Heart:  Regular  rhythm,  No murmur, No

## 2023-10-11 NOTE — OP NOTE
Discharge Disposition:  Home in the company of a  when able to ambulate.     Marvin Barrientos MD  10/11/2023  9:24 AM

## 2024-03-13 ENCOUNTER — ANESTHESIA (OUTPATIENT)
Facility: HOSPITAL | Age: 49
End: 2024-03-13
Payer: OTHER GOVERNMENT

## 2024-03-13 ENCOUNTER — HOSPITAL ENCOUNTER (OUTPATIENT)
Facility: HOSPITAL | Age: 49
Setting detail: OUTPATIENT SURGERY
Discharge: HOME | End: 2024-03-13
Attending: INTERNAL MEDICINE | Admitting: INTERNAL MEDICINE
Payer: OTHER GOVERNMENT

## 2024-03-13 ENCOUNTER — ANESTHESIA EVENT (OUTPATIENT)
Facility: HOSPITAL | Age: 49
End: 2024-03-13
Payer: OTHER GOVERNMENT

## 2024-03-13 VITALS
BODY MASS INDEX: 21.69 KG/M2 | HEIGHT: 65 IN | TEMPERATURE: 98 F | DIASTOLIC BLOOD PRESSURE: 71 MMHG | HEART RATE: 67 BPM | SYSTOLIC BLOOD PRESSURE: 96 MMHG | WEIGHT: 130.2 LBS | RESPIRATION RATE: 18 BRPM | OXYGEN SATURATION: 99 %

## 2024-03-13 PROCEDURE — 2580000003 HC RX 258: Performed by: ANESTHESIOLOGY

## 2024-03-13 PROCEDURE — 88305 TISSUE EXAM BY PATHOLOGIST: CPT

## 2024-03-13 PROCEDURE — 3700000000 HC ANESTHESIA ATTENDED CARE: Performed by: INTERNAL MEDICINE

## 2024-03-13 PROCEDURE — 2709999900 HC NON-CHARGEABLE SUPPLY: Performed by: INTERNAL MEDICINE

## 2024-03-13 PROCEDURE — 6360000002 HC RX W HCPCS: Performed by: ANESTHESIOLOGY

## 2024-03-13 PROCEDURE — 7100000011 HC PHASE II RECOVERY - ADDTL 15 MIN: Performed by: INTERNAL MEDICINE

## 2024-03-13 PROCEDURE — 3700000001 HC ADD 15 MINUTES (ANESTHESIA): Performed by: INTERNAL MEDICINE

## 2024-03-13 PROCEDURE — 7100000010 HC PHASE II RECOVERY - FIRST 15 MIN: Performed by: INTERNAL MEDICINE

## 2024-03-13 PROCEDURE — 2500000003 HC RX 250 WO HCPCS: Performed by: ANESTHESIOLOGY

## 2024-03-13 PROCEDURE — 3600007512: Performed by: INTERNAL MEDICINE

## 2024-03-13 PROCEDURE — 3600007502: Performed by: INTERNAL MEDICINE

## 2024-03-13 RX ORDER — SODIUM CHLORIDE 0.9 % (FLUSH) 0.9 %
5-40 SYRINGE (ML) INJECTION PRN
Status: DISCONTINUED | OUTPATIENT
Start: 2024-03-13 | End: 2024-03-13 | Stop reason: HOSPADM

## 2024-03-13 RX ORDER — SODIUM CHLORIDE 9 MG/ML
INJECTION, SOLUTION INTRAVENOUS CONTINUOUS PRN
Status: DISCONTINUED | OUTPATIENT
Start: 2024-03-13 | End: 2024-03-13 | Stop reason: SDUPTHER

## 2024-03-13 RX ORDER — SODIUM CHLORIDE 0.9 % (FLUSH) 0.9 %
5-40 SYRINGE (ML) INJECTION EVERY 12 HOURS SCHEDULED
Status: DISCONTINUED | OUTPATIENT
Start: 2024-03-13 | End: 2024-03-13 | Stop reason: HOSPADM

## 2024-03-13 RX ORDER — SODIUM CHLORIDE 9 MG/ML
INJECTION, SOLUTION INTRAVENOUS CONTINUOUS
Status: DISCONTINUED | OUTPATIENT
Start: 2024-03-13 | End: 2024-03-13 | Stop reason: HOSPADM

## 2024-03-13 RX ORDER — SODIUM CHLORIDE 9 MG/ML
25 INJECTION, SOLUTION INTRAVENOUS PRN
Status: DISCONTINUED | OUTPATIENT
Start: 2024-03-13 | End: 2024-03-13 | Stop reason: HOSPADM

## 2024-03-13 RX ORDER — LIDOCAINE HYDROCHLORIDE 20 MG/ML
INJECTION, SOLUTION EPIDURAL; INFILTRATION; INTRACAUDAL; PERINEURAL PRN
Status: DISCONTINUED | OUTPATIENT
Start: 2024-03-13 | End: 2024-03-13 | Stop reason: SDUPTHER

## 2024-03-13 RX ADMIN — PROPOFOL 50 MG: 10 INJECTION, EMULSION INTRAVENOUS at 07:51

## 2024-03-13 RX ADMIN — PROPOFOL 50 MG: 10 INJECTION, EMULSION INTRAVENOUS at 07:47

## 2024-03-13 RX ADMIN — LIDOCAINE HYDROCHLORIDE 50 MG: 20 INJECTION, SOLUTION EPIDURAL; INFILTRATION; INTRACAUDAL; PERINEURAL at 07:46

## 2024-03-13 RX ADMIN — PROPOFOL 50 MG: 10 INJECTION, EMULSION INTRAVENOUS at 07:54

## 2024-03-13 RX ADMIN — SODIUM CHLORIDE: 9 INJECTION, SOLUTION INTRAVENOUS at 07:21

## 2024-03-13 RX ADMIN — PROPOFOL 50 MG: 10 INJECTION, EMULSION INTRAVENOUS at 07:46

## 2024-03-13 RX ADMIN — PROPOFOL 50 MG: 10 INJECTION, EMULSION INTRAVENOUS at 07:49

## 2024-03-13 ASSESSMENT — PAIN - FUNCTIONAL ASSESSMENT
PAIN_FUNCTIONAL_ASSESSMENT: NONE - DENIES PAIN
PAIN_FUNCTIONAL_ASSESSMENT: NONE - DENIES PAIN

## 2024-03-13 NOTE — DISCHARGE INSTRUCTIONS
Incorporated.   Care instructions adapted under license by your healthcare professional. If you have questions about a medical condition or this instruction, always ask your healthcare professional. [employer] disclaims any warranty or liability for your use of this information.

## 2024-03-13 NOTE — OP NOTE
cold forceps    Specimen Removed:    ID Type Source Tests Collected by Time Destination   1 : bx abnormal mucosa distal rectum Tissue Rectum SURGICAL PATHOLOGY Porfirio Gamez MD 3/13/2024 0753    2 : abnormal mucosa mid-rectum bx Tissue Rectum SURGICAL PATHOLOGY Porfirio Gamez MD 3/13/2024 0754        Complications: None.     EBL:  None.    Recommendations:   -Await pathology.  -Regular diet.  -Consult colorectal surgery (Dr. Mustafa)  -Resume normal medication(s).  -Further plan based upon the biopsy findings and consult with [facility]     Discharge Disposition:  Home in the company of a  when able to ambulate.    Porfirio Gamez MD  3/13/2024  8:00 AM

## 2024-03-13 NOTE — PROGRESS NOTES

## 2024-03-13 NOTE — ANESTHESIA PRE PROCEDURE
Department of Anesthesiology  Preprocedure Note       Name:  Savannah Pace   Age:  48 y.o.  :  1975                                          MRN:  [id number]         Date:  3/13/2024      Surgeon: Surgeon(s):  Porfirio Gamez MD    Procedure: Procedure(s):  FLEXIBLE SIGMOIDOSCOPY    Medications prior to admission:   Prior to Admission medications    Medication Sig Start Date End Date Taking? Authorizing Provider   Saccharomyces boulardii (PROBIOTIC) 250 MG CAPS Take by mouth    Provider, MD Andrea   fexofenadine (ALLEGRA) 180 MG tablet Allegra Allergy 180 mg tablet 17   Automatic Reconciliation, Ar   levonorgestrel-ethinyl estradiol (AVIANE;ALESSE;LESSINA) 0.1-20 MG-MCG per tablet Take 1 tablet by mouth daily 22   Automatic Reconciliation, Ar   mesalamine (DELZICOL) 800 MG TBEC TBEC tablet Asacol  mg tablet,delayed release   Prescribed by non [facility] MD 11/30/15   Automatic Reconciliation, Ar       Current medications:    Current Facility-Administered Medications   Medication Dose Route Frequency Provider Last Rate Last Admin   • 0.9 % sodium chloride infusion   IntraVENous Continuous Porfirio Gamez MD   Stopped at 24 0820   • sodium chloride flush 0.9 % injection 5-40 mL  5-40 mL IntraVENous 2 times per day Porfirio Gamez MD       • sodium chloride flush 0.9 % injection 5-40 mL  5-40 mL IntraVENous PRN Porfirio Gamez MD       • 0.9 % sodium chloride infusion  25 mL IntraVENous PRN Porfirio Gamez MD         Current Outpatient Medications   Medication Sig Dispense Refill   • Saccharomyces boulardii (PROBIOTIC) 250 MG CAPS Take by mouth     • fexofenadine (ALLEGRA) 180 MG tablet Allegra Allergy 180 mg tablet     • levonorgestrel-ethinyl estradiol (AVIANE;ALESSE;LESSINA) 0.1-20 MG-MCG per tablet Take 1 tablet by mouth daily     • mesalamine (DELZICOL) 800 MG TBEC TBEC tablet Asacol  mg tablet,delayed release   Prescribed by non [facility] MD         Allergies:    Allergies

## 2024-03-13 NOTE — H&P
[facility]  [address]  [place] [address] [phone]                                History and Physical     NAME: Savannah Pace   :  1975   MRN:  [id number]     HPI:  The patient was seen and examined.    Past Surgical History:   Procedure Laterality Date    COLONOSCOPY N/A 3/13/2023    COLONOSCOPY performed by Lissette Fraser MD at [facility] ENDOSCOPY    COLONOSCOPY N/A 2023    COLONOSCOPY, EMR performed by Porfirio Gamez MD at [facility] ENDOSCOPY    COLONOSCOPY N/A 2023    COLONOSCOPY CONTROL HEMORRHAGE performed by Porfirio Gamez MD at [facility] ENDOSCOPY    COLONOSCOPY N/A 2023    COLONOSCOPY POLYPECTOMY HOT BIOPSY performed by Porfirio Gamez MD at [facility] ENDOSCOPY    HERNIA REPAIR      KNEE ARTHROSCOPY Bilateral     Meniscus    MEDICATION INJECTION N/A 2023    INJECTION MEDICATION performed by Porfirio Gamez MD at [facility] ENDOSCOPY    OTHER SURGICAL HISTORY  2021    sinus surgery     SEPTOPLASTY  2021    SIGMOIDOSCOPY N/A 10/11/2023    FLEXIBLE SIGMOIDOSCOPY performed by Porfirio Gamez MD at [facility] ENDOSCOPY     Past Medical History:   Diagnosis Date    Asthma     Ulcerative colitis (HCC)      Social History     Tobacco Use    Smoking status: Never    Smokeless tobacco: Never   Substance Use Topics    Alcohol use: Yes     Alcohol/week: 1.0 standard drink of alcohol     Types: 1 Drinks containing 0.5 oz of alcohol per week    Drug use: Never     Allergies   Allergen Reactions    Neosporin [Bacitracin-Polymyxin B] Hives    Corn Oil Rash    Tomato Rash     Family History   Problem Relation Age of Onset    Hypertension Sister     Colon Cancer Paternal Aunt     Elevated Lipids Sister     No Known Problems Father     Elevated Lipids Mother     Hypertension Mother      No current facility-administered medications for this encounter.     Facility-Administered Medications Ordered in Other Encounters   Medication Dose Route Frequency    0.9 % sodium chloride

## 2024-03-13 NOTE — ANESTHESIA POSTPROCEDURE EVALUATION
Completed forms faxed back to [facility] at [phone].   Originals sent to be scanned.     Kathryn Carson          Department of Anesthesiology  Postprocedure Note    Patient: Savannah Pace  MRN: [id number]  YOB: 1975  Date of evaluation: 3/13/2024    Procedure Summary       Date: 03/13/24 Room / Location: [facility] ENDO 06 / [facility] ENDOSCOPY    Anesthesia Start: 0744 Anesthesia Stop: 0758    Procedure: FLEXIBLE SIGMOIDOSCOPY (Lower GI Region) Diagnosis:       Ulcerative colitis with complication, unspecified location (HCC)      (Ulcerative colitis with complication, unspecified location (HCC) [K51.919])    Surgeons: Porfirio Gamez MD Responsible Provider: Juan Cobb MD    Anesthesia Type: MAC ASA Status: 2            Anesthesia Type: MAC    Meli Phase I: Meli Score: 10    Meli Phase II: Meli Score: 10    Anesthesia Post Evaluation    Patient location during evaluation: bedside  Patient participation: complete - patient participated  Level of consciousness: awake  Airway patency: patent  Nausea & Vomiting: no nausea  Cardiovascular status: blood pressure returned to baseline  Respiratory status: acceptable  Hydration status: euvolemic  Pain management: adequate    No notable events documented.

## 2024-06-03 ENCOUNTER — ANESTHESIA EVENT (OUTPATIENT)
Facility: HOSPITAL | Age: 49
End: 2024-06-03
Payer: OTHER GOVERNMENT

## 2024-06-04 ENCOUNTER — HOSPITAL ENCOUNTER (OUTPATIENT)
Facility: HOSPITAL | Age: 49
Setting detail: OUTPATIENT SURGERY
Discharge: HOME | End: 2024-06-04
Attending: COLON & RECTAL SURGERY | Admitting: COLON & RECTAL SURGERY
Payer: OTHER GOVERNMENT

## 2024-06-04 ENCOUNTER — ANESTHESIA (OUTPATIENT)
Facility: HOSPITAL | Age: 49
End: 2024-06-04
Payer: OTHER GOVERNMENT

## 2024-06-04 VITALS
WEIGHT: 135 LBS | SYSTOLIC BLOOD PRESSURE: 93 MMHG | HEART RATE: 61 BPM | HEIGHT: 65 IN | DIASTOLIC BLOOD PRESSURE: 60 MMHG | TEMPERATURE: 97.9 F | RESPIRATION RATE: 14 BRPM | BODY MASS INDEX: 22.49 KG/M2 | OXYGEN SATURATION: 97 %

## 2024-06-04 DIAGNOSIS — G89.18 ACUTE POST-OPERATIVE PAIN: Primary | ICD-10-CM

## 2024-06-04 LAB — HCG UR QL: NEGATIVE

## 2024-06-04 PROCEDURE — 3700000001 HC ADD 15 MINUTES (ANESTHESIA): Performed by: COLON & RECTAL SURGERY

## 2024-06-04 PROCEDURE — 88305 TISSUE EXAM BY PATHOLOGIST: CPT

## 2024-06-04 PROCEDURE — 7100000010 HC PHASE II RECOVERY - FIRST 15 MIN: Performed by: COLON & RECTAL SURGERY

## 2024-06-04 PROCEDURE — 2580000003 HC RX 258: Performed by: COLON & RECTAL SURGERY

## 2024-06-04 PROCEDURE — 6360000002 HC RX W HCPCS: Performed by: ANESTHESIOLOGY

## 2024-06-04 PROCEDURE — 7100000001 HC PACU RECOVERY - ADDTL 15 MIN: Performed by: COLON & RECTAL SURGERY

## 2024-06-04 PROCEDURE — 2500000003 HC RX 250 WO HCPCS: Performed by: ANESTHESIOLOGY

## 2024-06-04 PROCEDURE — 3600000004 HC SURGERY LEVEL 4 BASE: Performed by: COLON & RECTAL SURGERY

## 2024-06-04 PROCEDURE — 6360000002 HC RX W HCPCS: Performed by: COLON & RECTAL SURGERY

## 2024-06-04 PROCEDURE — 2500000003 HC RX 250 WO HCPCS: Performed by: COLON & RECTAL SURGERY

## 2024-06-04 PROCEDURE — 81025 URINE PREGNANCY TEST: CPT

## 2024-06-04 PROCEDURE — 7100000000 HC PACU RECOVERY - FIRST 15 MIN: Performed by: COLON & RECTAL SURGERY

## 2024-06-04 PROCEDURE — 3600000014 HC SURGERY LEVEL 4 ADDTL 15MIN: Performed by: COLON & RECTAL SURGERY

## 2024-06-04 PROCEDURE — 6370000000 HC RX 637 (ALT 250 FOR IP): Performed by: ANESTHESIOLOGY

## 2024-06-04 PROCEDURE — 2580000003 HC RX 258: Performed by: ANESTHESIOLOGY

## 2024-06-04 PROCEDURE — 6370000000 HC RX 637 (ALT 250 FOR IP): Performed by: COLON & RECTAL SURGERY

## 2024-06-04 PROCEDURE — 2709999900 HC NON-CHARGEABLE SUPPLY: Performed by: COLON & RECTAL SURGERY

## 2024-06-04 PROCEDURE — 3700000000 HC ANESTHESIA ATTENDED CARE: Performed by: COLON & RECTAL SURGERY

## 2024-06-04 RX ORDER — SODIUM CHLORIDE 9 MG/ML
INJECTION, SOLUTION INTRAVENOUS PRN
Status: DISCONTINUED | OUTPATIENT
Start: 2024-06-04 | End: 2024-06-04 | Stop reason: HOSPADM

## 2024-06-04 RX ORDER — ACETAMINOPHEN 500 MG
1000 TABLET ORAL ONCE
Status: COMPLETED | OUTPATIENT
Start: 2024-06-04 | End: 2024-06-04

## 2024-06-04 RX ORDER — HYDROMORPHONE HYDROCHLORIDE 1 MG/ML
0.5 INJECTION, SOLUTION INTRAMUSCULAR; INTRAVENOUS; SUBCUTANEOUS EVERY 5 MIN PRN
Status: DISCONTINUED | OUTPATIENT
Start: 2024-06-04 | End: 2024-06-04 | Stop reason: HOSPADM

## 2024-06-04 RX ORDER — FENTANYL CITRATE 50 UG/ML
INJECTION, SOLUTION INTRAMUSCULAR; INTRAVENOUS PRN
Status: DISCONTINUED | OUTPATIENT
Start: 2024-06-04 | End: 2024-06-04 | Stop reason: SDUPTHER

## 2024-06-04 RX ORDER — KETAMINE HCL IN NACL, ISO-OSM 100MG/10ML
SYRINGE (ML) INJECTION PRN
Status: DISCONTINUED | OUTPATIENT
Start: 2024-06-04 | End: 2024-06-04 | Stop reason: SDUPTHER

## 2024-06-04 RX ORDER — ROCURONIUM BROMIDE 10 MG/ML
INJECTION, SOLUTION INTRAVENOUS PRN
Status: DISCONTINUED | OUTPATIENT
Start: 2024-06-04 | End: 2024-06-04 | Stop reason: SDUPTHER

## 2024-06-04 RX ORDER — MIDAZOLAM HYDROCHLORIDE 2 MG/2ML
2 INJECTION, SOLUTION INTRAMUSCULAR; INTRAVENOUS
Status: DISCONTINUED | OUTPATIENT
Start: 2024-06-04 | End: 2024-06-04 | Stop reason: HOSPADM

## 2024-06-04 RX ORDER — ONDANSETRON 2 MG/ML
INJECTION INTRAMUSCULAR; INTRAVENOUS PRN
Status: DISCONTINUED | OUTPATIENT
Start: 2024-06-04 | End: 2024-06-04 | Stop reason: SDUPTHER

## 2024-06-04 RX ORDER — PROPOFOL 10 MG/ML
INJECTION, EMULSION INTRAVENOUS CONTINUOUS PRN
Status: DISCONTINUED | OUTPATIENT
Start: 2024-06-04 | End: 2024-06-04 | Stop reason: SDUPTHER

## 2024-06-04 RX ORDER — MIDAZOLAM HYDROCHLORIDE 1 MG/ML
INJECTION INTRAMUSCULAR; INTRAVENOUS PRN
Status: DISCONTINUED | OUTPATIENT
Start: 2024-06-04 | End: 2024-06-04 | Stop reason: SDUPTHER

## 2024-06-04 RX ORDER — KETOROLAC TROMETHAMINE 30 MG/ML
INJECTION, SOLUTION INTRAMUSCULAR; INTRAVENOUS PRN
Status: DISCONTINUED | OUTPATIENT
Start: 2024-06-04 | End: 2024-06-04 | Stop reason: SDUPTHER

## 2024-06-04 RX ORDER — DEXMEDETOMIDINE HYDROCHLORIDE 100 UG/ML
INJECTION, SOLUTION INTRAVENOUS PRN
Status: DISCONTINUED | OUTPATIENT
Start: 2024-06-04 | End: 2024-06-04 | Stop reason: SDUPTHER

## 2024-06-04 RX ORDER — HYDROMORPHONE HYDROCHLORIDE 2 MG/1
2-4 TABLET ORAL EVERY 4 HOURS PRN
Qty: 40 TABLET | Refills: 0 | Status: SHIPPED | OUTPATIENT
Start: 2024-06-04 | End: 2024-06-11

## 2024-06-04 RX ORDER — HYDROMORPHONE HYDROCHLORIDE 2 MG/1
1 TABLET ORAL ONCE
Status: COMPLETED | OUTPATIENT
Start: 2024-06-04 | End: 2024-06-04

## 2024-06-04 RX ORDER — PROCHLORPERAZINE EDISYLATE 5 MG/ML
5 INJECTION INTRAMUSCULAR; INTRAVENOUS
Status: COMPLETED | OUTPATIENT
Start: 2024-06-04 | End: 2024-06-04

## 2024-06-04 RX ORDER — SODIUM CHLORIDE, SODIUM LACTATE, POTASSIUM CHLORIDE, CALCIUM CHLORIDE 600; 310; 30; 20 MG/100ML; MG/100ML; MG/100ML; MG/100ML
INJECTION, SOLUTION INTRAVENOUS CONTINUOUS
Status: DISCONTINUED | OUTPATIENT
Start: 2024-06-04 | End: 2024-06-04 | Stop reason: HOSPADM

## 2024-06-04 RX ORDER — SODIUM CHLORIDE 0.9 % (FLUSH) 0.9 %
5-40 SYRINGE (ML) INJECTION PRN
Status: DISCONTINUED | OUTPATIENT
Start: 2024-06-04 | End: 2024-06-04 | Stop reason: HOSPADM

## 2024-06-04 RX ORDER — BUPIVACAINE HYDROCHLORIDE AND EPINEPHRINE 2.5; 5 MG/ML; UG/ML
20 INJECTION, SOLUTION EPIDURAL; INFILTRATION; INTRACAUDAL; PERINEURAL ONCE
Status: COMPLETED | OUTPATIENT
Start: 2024-06-04 | End: 2024-06-04

## 2024-06-04 RX ORDER — SODIUM CHLORIDE 0.9 % (FLUSH) 0.9 %
5-40 SYRINGE (ML) INJECTION EVERY 12 HOURS SCHEDULED
Status: DISCONTINUED | OUTPATIENT
Start: 2024-06-04 | End: 2024-06-04 | Stop reason: HOSPADM

## 2024-06-04 RX ORDER — FENTANYL CITRATE 50 UG/ML
25 INJECTION, SOLUTION INTRAMUSCULAR; INTRAVENOUS EVERY 5 MIN PRN
Status: DISCONTINUED | OUTPATIENT
Start: 2024-06-04 | End: 2024-06-04 | Stop reason: HOSPADM

## 2024-06-04 RX ORDER — HYDRALAZINE HYDROCHLORIDE 20 MG/ML
10 INJECTION INTRAMUSCULAR; INTRAVENOUS
Status: DISCONTINUED | OUTPATIENT
Start: 2024-06-04 | End: 2024-06-04 | Stop reason: HOSPADM

## 2024-06-04 RX ORDER — OXYCODONE HYDROCHLORIDE 5 MG/1
5 TABLET ORAL
Status: DISCONTINUED | OUTPATIENT
Start: 2024-06-04 | End: 2024-06-04 | Stop reason: HOSPADM

## 2024-06-04 RX ORDER — NALOXONE HYDROCHLORIDE 0.4 MG/ML
INJECTION, SOLUTION INTRAMUSCULAR; INTRAVENOUS; SUBCUTANEOUS PRN
Status: DISCONTINUED | OUTPATIENT
Start: 2024-06-04 | End: 2024-06-04 | Stop reason: HOSPADM

## 2024-06-04 RX ORDER — FENTANYL CITRATE 50 UG/ML
100 INJECTION, SOLUTION INTRAMUSCULAR; INTRAVENOUS
Status: DISCONTINUED | OUTPATIENT
Start: 2024-06-04 | End: 2024-06-04 | Stop reason: HOSPADM

## 2024-06-04 RX ORDER — DEXAMETHASONE SODIUM PHOSPHATE 4 MG/ML
INJECTION, SOLUTION INTRA-ARTICULAR; INTRALESIONAL; INTRAMUSCULAR; INTRAVENOUS; SOFT TISSUE PRN
Status: DISCONTINUED | OUTPATIENT
Start: 2024-06-04 | End: 2024-06-04 | Stop reason: SDUPTHER

## 2024-06-04 RX ORDER — LIDOCAINE HYDROCHLORIDE 10 MG/ML
1 INJECTION, SOLUTION EPIDURAL; INFILTRATION; INTRACAUDAL; PERINEURAL
Status: DISCONTINUED | OUTPATIENT
Start: 2024-06-04 | End: 2024-06-04 | Stop reason: HOSPADM

## 2024-06-04 RX ORDER — ONDANSETRON 2 MG/ML
4 INJECTION INTRAMUSCULAR; INTRAVENOUS
Status: COMPLETED | OUTPATIENT
Start: 2024-06-04 | End: 2024-06-04

## 2024-06-04 RX ORDER — SUCCINYLCHOLINE/SOD CL,ISO/PF 200MG/10ML
SYRINGE (ML) INTRAVENOUS PRN
Status: DISCONTINUED | OUTPATIENT
Start: 2024-06-04 | End: 2024-06-04 | Stop reason: SDUPTHER

## 2024-06-04 RX ADMIN — KETOROLAC TROMETHAMINE 15 MG: 30 INJECTION, SOLUTION INTRAMUSCULAR at 12:53

## 2024-06-04 RX ADMIN — CEFOXITIN SODIUM 2000 MG: 2 POWDER, FOR SOLUTION INTRAVENOUS at 12:10

## 2024-06-04 RX ADMIN — MIDAZOLAM 2 MG: 1 INJECTION INTRAMUSCULAR; INTRAVENOUS at 11:43

## 2024-06-04 RX ADMIN — DEXMEDETOMIDINE HYDROCHLORIDE 5 MCG: 100 INJECTION, SOLUTION, CONCENTRATE INTRAVENOUS at 12:10

## 2024-06-04 RX ADMIN — FENTANYL CITRATE 25 MCG: 50 INJECTION, SOLUTION INTRAMUSCULAR; INTRAVENOUS at 11:54

## 2024-06-04 RX ADMIN — PROPOFOL 160 MG: 10 INJECTION, EMULSION INTRAVENOUS at 11:54

## 2024-06-04 RX ADMIN — HYDROMORPHONE HYDROCHLORIDE 1 MG: 2 TABLET ORAL at 14:20

## 2024-06-04 RX ADMIN — ACETAMINOPHEN 1000 MG: 500 TABLET ORAL at 10:17

## 2024-06-04 RX ADMIN — Medication 160 MG: at 11:55

## 2024-06-04 RX ADMIN — PROPOFOL 150 MCG/KG/MIN: 10 INJECTION, EMULSION INTRAVENOUS at 11:56

## 2024-06-04 RX ADMIN — ONDANSETRON 4 MG: 2 INJECTION INTRAMUSCULAR; INTRAVENOUS at 12:45

## 2024-06-04 RX ADMIN — ROCURONIUM BROMIDE 10 MG: 10 SOLUTION INTRAVENOUS at 11:54

## 2024-06-04 RX ADMIN — Medication 20 MG: at 12:10

## 2024-06-04 RX ADMIN — DEXAMETHASONE SODIUM PHOSPHATE 4 MG: 4 INJECTION, SOLUTION INTRAMUSCULAR; INTRAVENOUS at 12:00

## 2024-06-04 RX ADMIN — PROPOFOL 40 MG: 10 INJECTION, EMULSION INTRAVENOUS at 11:58

## 2024-06-04 RX ADMIN — PROCHLORPERAZINE EDISYLATE 5 MG: 5 INJECTION INTRAMUSCULAR; INTRAVENOUS at 14:06

## 2024-06-04 RX ADMIN — SODIUM CHLORIDE, POTASSIUM CHLORIDE, SODIUM LACTATE AND CALCIUM CHLORIDE: 600; 310; 30; 20 INJECTION, SOLUTION INTRAVENOUS at 10:38

## 2024-06-04 RX ADMIN — FENTANYL CITRATE 25 MCG: 50 INJECTION, SOLUTION INTRAMUSCULAR; INTRAVENOUS at 12:05

## 2024-06-04 RX ADMIN — ONDANSETRON 4 MG: 2 INJECTION INTRAMUSCULAR; INTRAVENOUS at 14:05

## 2024-06-04 RX ADMIN — FENTANYL CITRATE 50 MCG: 50 INJECTION, SOLUTION INTRAMUSCULAR; INTRAVENOUS at 11:59

## 2024-06-04 ASSESSMENT — PAIN - FUNCTIONAL ASSESSMENT: PAIN_FUNCTIONAL_ASSESSMENT: 0-10

## 2024-06-04 ASSESSMENT — PAIN SCALES - GENERAL: PAINLEVEL_OUTOF10: 0

## 2024-06-04 NOTE — PROGRESS NOTES
Discharge instructions reviewed with patient and family using teachback. All questions have been answered. Prescriptions sent to [facility]. Vital signs stable, pain appropriately managed. Patient wheeled off the unit with PACU staff.

## 2024-06-04 NOTE — DISCHARGE INSTRUCTIONS
You had Dilaudid 1mg (prescription pain pill) at 2:20pm.    Post-Operative Instructions      Diet:  Consume a high fiber diet as tolerated.  Such a diet may include fresh fruits, vegetables, and whole grain cereals and breads.  You should also drink 8-10 glasses of water, juice, or other non-alcoholic beverages per day.     Fiber Supplements:  Take 1 dose of Metamucil or other over-the-counter fiber supplement (Citrucel, BeneFiber, etc.) as directed each morning and another dose each evening.    Medications:  Take prescription pain medication (hydromorphone) as prescribed.  Do not drive, drink alcohol, or operate machinery while taking the hydromorphone.  Take docusate (non-prescription stool softener) twice per day as directed.  Beginning today, take maximum doses of Tylenol (1000 mg every 6 hours) until you do not need pain medication anymore.  Doing so will help you to manage the pain and to use less of the hydromorphone.  Beginning on the second day after surgery, you may take ibuprofen as directed in addition to or instead of the prescription medication if there has been no significant bleeding.  Do not take pain medication on an empty stomach.  Do not take aspirin or aspirin-containing products for 10 days following the procedure.    Activity:  Do not engage in any heavy lifting or other strenuous activity until you have been seen for follow-up.  You may walk as much as you want, and you may climb stairs.  Frequent walking will help prevent constipation.    Hygiene and Wound Care:  Remove the dressing on the first day after surgery or just before your next bowel movement (whichever comes first), then begin performing sitz baths (soaking in warm water) 3 times per day and after bowel movements.  The water should be very warm, and you should soak for no longer than 20 minutes at a time.  Do not wipe the anal area with dry toilet tissue; instead, use baby wipes.  After sitz baths, cover the anal area with a gauze

## 2024-06-04 NOTE — ANESTHESIA POSTPROCEDURE EVALUATION
Department of Anesthesiology  Postprocedure Note    Patient: Savannah Pace  MRN: [id number]  YOB: 1975  Date of evaluation: 6/4/2024    Procedure Summary       Date: 06/04/24 Room / Location: [facility] MAIN OR [address] [place] [address] MAIN OR    Anesthesia Start: 1143 Anesthesia Stop: 1321    Procedure: TRANSANAL EXCISION OF RECTAL POLYP, PARTIAL THICKNESS (Rectum) Diagnosis:       Rectal polyp      (Rectal polyp [K62.1])    Providers: Demarco Mustafa MD Responsible Provider: Ramsey Weems Jr., MD    Anesthesia Type: General ASA Status: 2            Anesthesia Type: General    Meli Phase I: Meli Score: 10    Meli Phase II:      Anesthesia Post Evaluation    Patient location during evaluation: PACU  Patient participation: complete - patient participated  Level of consciousness: awake  Pain score: 2  Airway patency: patent  Nausea & Vomiting: no nausea  Cardiovascular status: blood pressure returned to baseline and hemodynamically stable  Respiratory status: acceptable  Hydration status: stable  Multimodal analgesia pain management approach    No notable events documented.

## 2024-06-04 NOTE — PERIOP NOTE
Prior to discharge, patient ambulated to bathroom and voided without difficulty.  
others.  Until your surgical wound is healed, wear clothing and sleep on bed linens that are clean.  Do not allow pets to sleep in your bed with you or touch your surgical wound.  Do not smoke - smoking delays wound healing. This may be a good time to stop smoking.  If you have diabetes, it is important for you to manage your blood sugar levels properly before your surgery as well as after your surgery. Poorly managed blood sugar levels slow down wound healing and prevent you from healing completely.       Follow all instructions so your surgery won’t be cancelled.  Please, be on time.                    If a situation occurs and you are delayed the day of surgery, call [phone]/ [phone].    If your physical condition changes (like a fever, cold, flu, etc.) call your surgeon as soon as possible.    Home medication(s) reviewed and verified via during PAT appointment/call.    The patient was contacted via telephone.     She verbalized understanding of all instructions does not need reinforcement.

## 2024-06-04 NOTE — H&P
polyps     PONV (postoperative nausea and vomiting)     SEVERE    Ulcerative colitis (HCC)     Diagnosed in 2011    Vaginal delivery     Twice; sutures with one.       Past Surgical History:   Procedure Laterality Date    COLONOSCOPY N/A 03/13/2023    COLONOSCOPY performed by Lissette Fraser MD at [facility] ENDOSCOPY    COLONOSCOPY N/A 06/28/2023    COLONOSCOPY, EMR performed by Porfirio Gamez MD at [facility] ENDOSCOPY    COLONOSCOPY N/A 06/28/2023    COLONOSCOPY CONTROL HEMORRHAGE performed by Porfirio Gamez MD at [facility] ENDOSCOPY    COLONOSCOPY N/A 06/28/2023    COLONOSCOPY POLYPECTOMY HOT BIOPSY performed by Porfirio Gamez MD at [facility] ENDOSCOPY    HERNIA REPAIR      KNEE ARTHROSCOPY Right 2022    Meniscus    MEDICATION INJECTION N/A 06/28/2023    INJECTION MEDICATION performed by Porfirio Gamez MD at [facility] ENDOSCOPY    OTHER SURGICAL HISTORY  01/2021    sinus surgery     SEPTOPLASTY  01/2021    SIGMOIDOSCOPY N/A 10/11/2023    FLEXIBLE SIGMOIDOSCOPY performed by Porfirio Gamez MD at [facility] ENDOSCOPY    SIGMOIDOSCOPY N/A 03/13/2024    FLEXIBLE SIGMOIDOSCOPY performed by Porfirio Gamez MD at [facility] ENDOSCOPY       Family History   Problem Relation Age of Onset    Elevated Lipids Mother     Hypertension Mother     Heart Disease Father     High Blood Pressure Brother     High Cholesterol Brother     Colon Cancer Paternal Aunt     Anesth Problems Neg Hx      Social History     Socioeconomic History    Marital status:      Spouse name: Not on file    Number of children: Not on file    Years of education: Not on file    Highest education level: Not on file   Occupational History    Not on file   Tobacco Use    Smoking status: Never    Smokeless tobacco: Never   Substance and Sexual Activity    Alcohol use: Yes     Alcohol/week: 1.0 standard drink of alcohol     Types: 1 Drinks containing 0.5 oz of alcohol per week     Comment: 1-2 GLASSES WINE WEEKLY    Drug use: Never    Sexual activity: Not on file   Other Topics

## 2024-06-04 NOTE — ANESTHESIA PRE PROCEDURE
Department of Anesthesiology  Preprocedure Note       Name:  Savannah Pace   Age:  48 y.o.  :  1975                                          MRN:  [id number]         Date:  2024      Surgeon: Surgeon(s):  Demarco Mustafa MD    Procedure: Procedure(s):  TRANSANAL EXCISION OF RECTAL POLYP    Medications prior to admission:   Prior to Admission medications    Medication Sig Start Date End Date Taking? Authorizing Provider   Saccharomyces boulardii (PROBIOTIC) 250 MG CAPS Take by mouth    Provider, MD Andrea   fexofenadine (ALLEGRA) 180 MG tablet Allegra Allergy 180 mg tablet 17   Automatic Reconciliation, Ar   levonorgestrel-ethinyl estradiol (AVIANE;ALESSE;LESSINA) 0.1-20 MG-MCG per tablet Take 1 tablet by mouth daily 22   Automatic Reconciliation, Ar   mesalamine (DELZICOL) 800 MG TBEC TBEC tablet Asacol  mg tablet,delayed release   Prescribed by non [facility] MD 11/30/15   Automatic Reconciliation, Ar       Current medications:    Current Facility-Administered Medications   Medication Dose Route Frequency Provider Last Rate Last Admin   • lidocaine PF 1 % injection 1 mL  1 mL IntraDERmal Once PRN Ramsey Weems Jr., MD       • fentaNYL (SUBLIMAZE) injection 100 mcg  100 mcg IntraVENous Once PRN Ramsey Weems Jr., MD       • lactated ringers IV soln infusion   IntraVENous Continuous Ramsey Weems Jr., MD       • sodium chloride flush 0.9 % injection 5-40 mL  5-40 mL IntraVENous 2 times per day Ramsey Weems Jr., MD       • sodium chloride flush 0.9 % injection 5-40 mL  5-40 mL IntraVENous PRN Ramsey Weems Jr., MD       • 0.9 % sodium chloride infusion   IntraVENous PRN Ramsey Weems Jr., MD       • midazolam PF (VERSED) injection 2 mg  2 mg IntraVENous Once PRN Ramsey Weems Jr., MD       • gelatin adsorbable (GELFOAM) sponge 1 each  1 each Apply externally Once Demarco Mustafa MD       • BUPivacaine-EPINEPHrine PF (MARCAINE-w/EPINEPHrine)

## 2024-06-04 NOTE — BRIEF OP NOTE
162.56 Brief Postoperative Note      Patient: Savannah Pace  YOB: 1975  MRN: [id number]    Date of Procedure: 6/4/2024    Pre-Op Diagnosis Codes:  Rectal polyp [K62.1]  Post-Op Diagnosis:  Rectal polyp  Procedure:  Transanal excision of rectal polyp, partial thickness  Surgeon:  Demarco Mustafa MD  Assistant:  Bean Lee SA  Anesthesia: General endotracheal  Specimens:   ID Type Source Tests Collected by Time Destination   POSTERIOR QUADRANT DISTAL RECTAL POLYP- SUTURE MARKS DISTAL MARGIN Tissue Rectum SURGICAL PATHOLOGY Demarco Mustafa MD 6/4/2024 1235    Drains:  None  Estimated Blood Loss:  < 20 mL  Crystalloid:  700 mL  Complications:  None apparent  Implants:  None  Findings:  Infection Present At Time Of Surgery (PATOS) (choose all levels that have infection present):  No infection present  Other Findings:  Small residual neoplastic lesion in the posterior quadrant just proximal to the dentate line.  This procedure was not performed to treat primary cutaneous melanoma through wide local excision    Electronically signed by Demarco Mustafa MD

## 2024-09-23 ENCOUNTER — OFFICE VISIT (OUTPATIENT)
Age: 49
End: 2024-09-23

## 2024-09-23 VITALS
BODY MASS INDEX: 24.66 KG/M2 | DIASTOLIC BLOOD PRESSURE: 64 MMHG | HEIGHT: 65 IN | HEART RATE: 68 BPM | OXYGEN SATURATION: 97 % | WEIGHT: 148 LBS | SYSTOLIC BLOOD PRESSURE: 102 MMHG | TEMPERATURE: 97.9 F | RESPIRATION RATE: 18 BRPM

## 2024-09-23 DIAGNOSIS — J01.90 ACUTE BACTERIAL SINUSITIS: Primary | ICD-10-CM

## 2024-09-23 DIAGNOSIS — B96.89 ACUTE BACTERIAL SINUSITIS: Primary | ICD-10-CM

## 2024-09-23 RX ORDER — AMOXICILLIN 500 MG/1
500 CAPSULE ORAL 2 TIMES DAILY
Qty: 20 CAPSULE | Refills: 0 | Status: SHIPPED | OUTPATIENT
Start: 2024-09-23 | End: 2024-10-03

## 2024-09-23 ASSESSMENT — ENCOUNTER SYMPTOMS
SINUS PAIN: 1
RESPIRATORY NEGATIVE: 1
ALLERGIC/IMMUNOLOGIC NEGATIVE: 1
SINUS PRESSURE: 1
GASTROINTESTINAL NEGATIVE: 1
RHINORRHEA: 1
EYES NEGATIVE: 1

## 2024-10-06 ENCOUNTER — OFFICE VISIT (OUTPATIENT)
Age: 49
End: 2024-10-06

## 2024-10-06 VITALS
SYSTOLIC BLOOD PRESSURE: 114 MMHG | DIASTOLIC BLOOD PRESSURE: 78 MMHG | OXYGEN SATURATION: 97 % | RESPIRATION RATE: 18 BRPM | HEART RATE: 73 BPM | BODY MASS INDEX: 23.26 KG/M2 | WEIGHT: 139.8 LBS | TEMPERATURE: 98.2 F

## 2024-10-06 DIAGNOSIS — J01.41 ACUTE RECURRENT PANSINUSITIS: Primary | ICD-10-CM

## 2024-10-06 RX ORDER — EPINEPHRINE 0.3 MG/.3ML
INJECTION SUBCUTANEOUS
COMMUNITY
Start: 2019-01-07

## 2024-10-06 RX ORDER — LEVOCETIRIZINE DIHYDROCHLORIDE 5 MG/1
5 TABLET, FILM COATED ORAL DAILY
COMMUNITY
Start: 2022-07-05

## 2024-10-06 RX ORDER — DOXYCYCLINE HYCLATE 100 MG
100 TABLET ORAL 2 TIMES DAILY
Qty: 14 TABLET | Refills: 0 | Status: SHIPPED | OUTPATIENT
Start: 2024-10-06 | End: 2024-10-13

## 2024-11-15 ENCOUNTER — ANESTHESIA EVENT (OUTPATIENT)
Facility: HOSPITAL | Age: 49
End: 2024-11-15
Payer: OTHER GOVERNMENT

## 2024-11-15 ENCOUNTER — HOSPITAL ENCOUNTER (OUTPATIENT)
Facility: HOSPITAL | Age: 49
Setting detail: OUTPATIENT SURGERY
Discharge: HOME | End: 2024-11-15
Attending: INTERNAL MEDICINE | Admitting: INTERNAL MEDICINE
Payer: OTHER GOVERNMENT

## 2024-11-15 ENCOUNTER — ANESTHESIA (OUTPATIENT)
Facility: HOSPITAL | Age: 49
End: 2024-11-15
Payer: OTHER GOVERNMENT

## 2024-11-15 VITALS
TEMPERATURE: 97 F | HEIGHT: 65 IN | SYSTOLIC BLOOD PRESSURE: 101 MMHG | RESPIRATION RATE: 19 BRPM | WEIGHT: 139 LBS | OXYGEN SATURATION: 99 % | BODY MASS INDEX: 23.16 KG/M2 | HEART RATE: 63 BPM | DIASTOLIC BLOOD PRESSURE: 59 MMHG

## 2024-11-15 LAB — HCG UR QL: NEGATIVE

## 2024-11-15 PROCEDURE — 7100000010 HC PHASE II RECOVERY - FIRST 15 MIN: Performed by: INTERNAL MEDICINE

## 2024-11-15 PROCEDURE — 6360000002 HC RX W HCPCS: Performed by: NURSE ANESTHETIST, CERTIFIED REGISTERED

## 2024-11-15 PROCEDURE — 3600007512: Performed by: INTERNAL MEDICINE

## 2024-11-15 PROCEDURE — 3700000001 HC ADD 15 MINUTES (ANESTHESIA): Performed by: INTERNAL MEDICINE

## 2024-11-15 PROCEDURE — 88305 TISSUE EXAM BY PATHOLOGIST: CPT

## 2024-11-15 PROCEDURE — 2709999900 HC NON-CHARGEABLE SUPPLY: Performed by: INTERNAL MEDICINE

## 2024-11-15 PROCEDURE — 7100000011 HC PHASE II RECOVERY - ADDTL 15 MIN: Performed by: INTERNAL MEDICINE

## 2024-11-15 PROCEDURE — 2500000003 HC RX 250 WO HCPCS: Performed by: NURSE ANESTHETIST, CERTIFIED REGISTERED

## 2024-11-15 PROCEDURE — 3700000000 HC ANESTHESIA ATTENDED CARE: Performed by: INTERNAL MEDICINE

## 2024-11-15 PROCEDURE — 2580000003 HC RX 258: Performed by: NURSE ANESTHETIST, CERTIFIED REGISTERED

## 2024-11-15 PROCEDURE — 3600007502: Performed by: INTERNAL MEDICINE

## 2024-11-15 PROCEDURE — 81025 URINE PREGNANCY TEST: CPT

## 2024-11-15 RX ORDER — SODIUM CHLORIDE 9 MG/ML
INJECTION, SOLUTION INTRAVENOUS CONTINUOUS
Status: DISCONTINUED | OUTPATIENT
Start: 2024-11-15 | End: 2024-11-15 | Stop reason: HOSPADM

## 2024-11-15 RX ORDER — SODIUM CHLORIDE 0.9 % (FLUSH) 0.9 %
5-40 SYRINGE (ML) INJECTION PRN
Status: DISCONTINUED | OUTPATIENT
Start: 2024-11-15 | End: 2024-11-15 | Stop reason: HOSPADM

## 2024-11-15 RX ORDER — 0.9 % SODIUM CHLORIDE 0.9 %
INTRAVENOUS SOLUTION INTRAVENOUS
Status: DISCONTINUED | OUTPATIENT
Start: 2024-11-15 | End: 2024-11-15 | Stop reason: SDUPTHER

## 2024-11-15 RX ORDER — SODIUM CHLORIDE 9 MG/ML
INJECTION, SOLUTION INTRAVENOUS PRN
Status: DISCONTINUED | OUTPATIENT
Start: 2024-11-15 | End: 2024-11-15 | Stop reason: HOSPADM

## 2024-11-15 RX ORDER — LIDOCAINE HYDROCHLORIDE 20 MG/ML
INJECTION, SOLUTION EPIDURAL; INFILTRATION; INTRACAUDAL; PERINEURAL
Status: DISCONTINUED | OUTPATIENT
Start: 2024-11-15 | End: 2024-11-15 | Stop reason: SDUPTHER

## 2024-11-15 RX ORDER — SODIUM CHLORIDE 0.9 % (FLUSH) 0.9 %
5-40 SYRINGE (ML) INJECTION EVERY 12 HOURS SCHEDULED
Status: DISCONTINUED | OUTPATIENT
Start: 2024-11-15 | End: 2024-11-15 | Stop reason: HOSPADM

## 2024-11-15 RX ADMIN — LIDOCAINE HYDROCHLORIDE 60 MG: 20 INJECTION, SOLUTION EPIDURAL; INFILTRATION; INTRACAUDAL; PERINEURAL at 11:40

## 2024-11-15 RX ADMIN — PROPOFOL 50 MG: 10 INJECTION, EMULSION INTRAVENOUS at 12:01

## 2024-11-15 RX ADMIN — PROPOFOL 100 MG: 10 INJECTION, EMULSION INTRAVENOUS at 11:40

## 2024-11-15 RX ADMIN — PROPOFOL 50 MG: 10 INJECTION, EMULSION INTRAVENOUS at 11:41

## 2024-11-15 RX ADMIN — PROPOFOL 50 MG: 10 INJECTION, EMULSION INTRAVENOUS at 11:43

## 2024-11-15 RX ADMIN — PROPOFOL 50 MG: 10 INJECTION, EMULSION INTRAVENOUS at 12:05

## 2024-11-15 RX ADMIN — PROPOFOL 100 MG: 10 INJECTION, EMULSION INTRAVENOUS at 11:47

## 2024-11-15 RX ADMIN — PROPOFOL 40 MG: 10 INJECTION, EMULSION INTRAVENOUS at 12:12

## 2024-11-15 RX ADMIN — SODIUM CHLORIDE 5 ML: 9 INJECTION, SOLUTION INTRAVENOUS at 12:16

## 2024-11-15 RX ADMIN — PROPOFOL 100 MG: 10 INJECTION, EMULSION INTRAVENOUS at 11:48

## 2024-11-15 RX ADMIN — PROPOFOL 100 MG: 10 INJECTION, EMULSION INTRAVENOUS at 11:51

## 2024-11-15 RX ADMIN — PROPOFOL 60 MG: 10 INJECTION, EMULSION INTRAVENOUS at 12:08

## 2024-11-15 RX ADMIN — PROPOFOL 100 MG: 10 INJECTION, EMULSION INTRAVENOUS at 11:45

## 2024-11-15 RX ADMIN — PROPOFOL 100 MG: 10 INJECTION, EMULSION INTRAVENOUS at 11:58

## 2024-11-15 RX ADMIN — PROPOFOL 100 MG: 10 INJECTION, EMULSION INTRAVENOUS at 11:54

## 2024-11-15 ASSESSMENT — PAIN SCALES - GENERAL: PAINLEVEL_OUTOF10: 0

## 2024-11-15 NOTE — ANESTHESIA PRE PROCEDURE
NONREACTIVE 08/23/2021 08:18 AM       COVID-19 Screening (If Applicable): No results found for: \"COVID19\"        Anesthesia Evaluation  Patient summary reviewed and Nursing notes reviewed   history of anesthetic complications: PONV.  Airway: Mallampati: II  TM distance: >3 FB   Neck ROM: full  Mouth opening: > = 3 FB   Dental: normal exam         Pulmonary: breath sounds clear to auscultation  (+)           asthma:                            Cardiovascular:Negative CV ROS  Exercise tolerance: good (>4 METS)          Rhythm: regular  Rate: normal                    Neuro/Psych:   Negative Neuro/Psych ROS              GI/Hepatic/Renal:   (+) PUD          Endo/Other:    (+) malignancy/cancer.                  ROS comment: Basal cell carcinoma  Abdominal: normal exam            Vascular: negative vascular ROS.         Other Findings:           Anesthesia Plan      MAC     ASA 2       Induction: intravenous.    MIPS: Prophylactic antiemetics administered.  Anesthetic plan and risks discussed with patient.      Plan discussed with CRNA.                  Lupe Henry DO   11/15/2024

## 2024-11-15 NOTE — H&P
[facility]  [address]  [place] [address] [phone]                                History and Physical     NAME: Savannah Pace   :  1975   MRN:  [id number]     HPI:  The patient was seen and examined.    Past Surgical History:   Procedure Laterality Date    ANUS SURGERY N/A 2024    TRANSANAL EXCISION OF RECTAL POLYP, PARTIAL THICKNESS performed by Demarco Mustafa MD at [facility] MAIN OR    COLONOSCOPY N/A 2023    COLONOSCOPY performed by Lissette Fraser MD at [facility] ENDOSCOPY    COLONOSCOPY N/A 2023    COLONOSCOPY, EMR performed by Porfirio Gamez MD at [facility] ENDOSCOPY    COLONOSCOPY N/A 2023    COLONOSCOPY CONTROL HEMORRHAGE performed by Porfirio Gamez MD at [facility] ENDOSCOPY    COLONOSCOPY N/A 2023    COLONOSCOPY POLYPECTOMY HOT BIOPSY performed by Porfirio Gamez MD at [facility] ENDOSCOPY    HERNIA REPAIR      KNEE ARTHROSCOPY Right     Meniscus    MEDICATION INJECTION N/A 2023    INJECTION MEDICATION performed by Porfirio Gamez MD at [facility] ENDOSCOPY    OTHER SURGICAL HISTORY  2021    sinus surgery     SEPTOPLASTY  2021    SIGMOIDOSCOPY N/A 10/11/2023    FLEXIBLE SIGMOIDOSCOPY performed by Porfirio Gamez MD at [facility] ENDOSCOPY    SIGMOIDOSCOPY N/A 2024    FLEXIBLE SIGMOIDOSCOPY performed by Porfirio Gamez MD at [facility] ENDOSCOPY     Past Medical History:   Diagnosis Date    Asthma     Cancer (HCC)     BASAL, LEG    History of rectal polyps     PONV (postoperative nausea and vomiting)     SEVERE    Ulcerative colitis (HCC)     Diagnosed in     Vaginal delivery     Twice; sutures with one.     Social History     Tobacco Use    Smoking status: Never    Smokeless tobacco: Never   Substance Use Topics    Alcohol use: Yes     Alcohol/week: 1.0 standard drink of alcohol     Types: 1 Drinks containing 0.5 oz of alcohol per week     Comment: 1-2 GLASSES WINE WEEKLY    Drug use: Never     Allergies   Allergen Reactions    Adhesive

## 2024-11-15 NOTE — OP NOTE
[facility]  [address]  [place] [address] [phone]                              Colonoscopy Procedure Note      Indications:  Long standing pan-ulcerative colitis, recurrent distal rectal polyp that was not amenable to complete colonoscopic excision s/p tranasanal partial thickness resection June, 4 2024    :  Lissette Fraser MD    Staff: Circulator: Jeri Solis RN  Endoscopy Technician: Cherie Patel  Relief Endoscopy Technician: Cherie Patel    Referring Provider: Amy Ying DO    Sedation:  MAC anesthesia    Procedure Details:  After informed consent was obtained with all risks and benefits of procedure explained and preoperative exam completed, the patient was taken to the endoscopy suite and placed in the left lateral decubitus position.  Upon sequential sedation as per above, a digital rectal exam was performed per below.  The Olympus videocolonoscope was inserted in the rectum and carefully advanced to the terminal ileum.  The quality of preparation was inadequate.  [place] Bowel Prep Score :1/2/1.The colonoscope was slowly withdrawn with careful evaluation between folds. Retroflexion in the rectum was performed.     Findings:   Rectum: Small internal hemorrhoids and skin tags. Focal scar in distal rectum extending to dentate line. Mild nodularity and mucosal changes at top border of scar - biopsies obtained. Focal 5 mm sessile adenomatous appearing polyp in anal canal on top of hemorrhoids - not biopsied or removed. Circumferential mild inflammation in mid rectum (patchy erosions and friability) - biopsied   Sigmoid: Subtle changes in vascularity with minimal scarring - biopsied, mild diverticulosis, small amount fibrous debris  Descending Colon: Subtle changes in vascularity with minimal scarring - biopsied, small amount fibrous debris  Transverse Colon: Subtle changes in vascularity with minimal scarring - biopsied, small amount  fibrous debris  Ascending Colon: Subtle changes in vascularity with minimal scarring - biopsied, small amount fibrous debris  Cecum: Subtle changes in vascularity with minimal scarring - biopsied, small amount fibrous debris  Terminal Ileum: normal     Interventions:  biopsies     Specimen Removed:    ID Type Source Tests Collected by Time Destination   1 : right colon bx Tissue Colon SURGICAL PATHOLOGY Lissette Fraser MD 11/15/2024 1152    2 : transverse colon bx Tissue Colon-Transverse SURGICAL PATHOLOGY Lissette Fraser MD 11/15/2024 1156    3 : left colon bx Tissue Colon SURGICAL PATHOLOGY Lissette Fraser MD 11/15/2024 1159    4 : prox rectal bx Tissue Rectum SURGICAL PATHOLOGY Lissette Fraser MD 11/15/2024 1205    5 : distal rectal bx Tissue Rectum SURGICAL PATHOLOGY Lissette Fraser MD 11/15/2024 1210        Complications: None.     EBL:  minimal     Impression:    See Postoperative diagnosis above    Recommendations:   - Await pathology. You should receive a letter within 2 weeks.   - Resume normal medications.  - Patient has appointment pending with colorectal surgery, Dr. Demarco Mustafa. He was also present at end of case for rectal endoscopic examination with suggests future plans for repeat transanal resection of residual adenomatous appearing tissue in anal canal.   - Recommend repeat colonoscopy in 1 year with double prep.     Discharge Disposition:  Home in the company of a  when able to ambulate.    Lissette Fraser MD  11/15/2024  12:19 PM

## 2024-11-15 NOTE — ANESTHESIA POSTPROCEDURE EVALUATION
Department of Anesthesiology  Postprocedure Note    Post-Anesthesia Evaluation and Assessment    Patient: Savannah Pace MRN: [id number]  SSN: [id number]    YOB: 1975  Age: 49 y.o.  Sex: female      I have evaluated the patient and they are stable and ready for discharge from the PACU.     Cardiovascular Function/Vital Signs  Visit Vitals  BP (!) 101/59   Pulse 63   Temp 97 °F (36.1 °C) (Temporal)   Resp 19   Ht 1.651 m (5' 5\")   Wt 63 kg (139 lb)   SpO2 99%   BMI 23.13 kg/m²       Patient is status post Monitor Anesthesia Care anesthesia for Procedure(s):  COLONOSCOPY.    Nausea/Vomiting: None    Postoperative hydration reviewed and adequate.    Pain:  Managed    Neurological Status:   At baseline    Mental Status, Level of Consciousness: Alert and  oriented to person, place, and time    Pulmonary Status:   Adequate oxygenation and airway patent    Complications related to anesthesia: None    Post-anesthesia assessment completed. No concerns    Signed By: Lupe Henry DO     November 15, 2024

## 2024-11-15 NOTE — DISCHARGE INSTRUCTIONS
[facility]  [phone]                                  Savannah Pace  [id number]  1975    It was my pleasure seeing you for your procedure.  You will also receive a summary report with the findings from this procedure and any further recommendations.  If you had polyps removed or biopsies taken during your procedure, you will receive a separate letter from me within approximately the next 2 weeks.  If you don't receive this letter or if you have any questions, please call my office [phone].     Please take note of the post procedure instructions listed below.    Dr. Evelio Millard      CARE FOLLOWING YOUR PROCEDURE    These instructions give you information on caring for yourself after your procedure. Call your doctor if you have any problems or questions after your procedure.    HOME CARE  Walk if you have belly cramping or gas.  Walking will help get rid of the air and reduce the bloated feeling in your belly (abdomen).  Your IV site (where you received drugs) may be tender to touch.  Place warm towels on the site; keep your arm up on two pillows if you have any swelling or soreness in the area.  You may shower.    ACTIVITY:  Take frequent rest periods and move at a slower pace for the next 24 hours..  You may resume your regular activity tomorrow if you are feeling back to normal.  Do not drive or ride a bicycle for at least 24 hours (because of the medicine (anesthesia) used during the test).  Do not sign any important legal documents or use or operate any machinery for 24 hours  Do not take sleeping medicines/nerve drugs for 24 hours unless the doctor tells you.  You can return to work/school tomorrow unless otherwise instructed.    NUTRITION:  Drink plenty of fluids to keep your pee (urine) clear or pale yellow  Begin with a light meal and progress to your normal diet. Heavy or fried foods are harder to digest and may make you feel sick to your stomach

## 2025-01-20 ENCOUNTER — ANESTHESIA EVENT (OUTPATIENT)
Facility: HOSPITAL | Age: 50
End: 2025-01-20
Payer: OTHER GOVERNMENT

## 2025-01-20 NOTE — PERIOP NOTE
[address] [address]   MAIN OR                                  [phone]    MAIN PRE OP             [phone]                                                                                AMBULATORY PRE OP          [phone]  PRE-ADMISSION TESTING    [phone]     Surgery Date:  01-       Is surgery arrival time given by surgeon?      If “NO”, [facility] staff will call you between 4 and 7pm the day before your surgery with your arrival time. (If your surgery is on a Monday, we will call you the Friday before.)    Call [phone] after 7pm Monday-Friday if you did not receive this call.    INSTRUCTIONS BEFORE YOUR SURGERY   When You  Arrive Arrive at [facility] Patient Access on 1st floor the day of your surgery.  Have your insurance card, photo ID,living will/advanced directive/POA (if applicable),  and any copayment (if needed)   Food   and   Drink NO solid food after midnight the night before surgery. You can drink clear liquids from midnight until ONE hour prior to your arrival at the hospital on the day of your surgery. Clear liquids include:  Water  Fruit juices without pulp (NO ORANGE JUICE)  Carbonated beverages  Black coffee(no cream/milk)  Tea(no cream/milk)  Gatorade    No alcohol (beer, wine, liquor) or marijuana (smoking) 24 hours, edibles (3 days). Stop smoking cigarettes 14 days before surgery (helps w/healing and breathing).   Medications to   TAKE   Morning of Surgery MEDICATIONS TO TAKE THE MORNING OF SURGERY WITH A SIP OF WATER:Mesalamine, Allegra    You may take these medications, IF NEEDED, the morning of surgery: Xyzal    Ask your surgeon/prescribing doctor for instructions on taking or stopping these medications prior to surgery:    Medications to STOP  before surgery Non-Steroidal anti-inflammatory Drugs (NSAID's): for example, Diclofenac (Voltaren), Ibuprofen (Advil, Motrin), Naproxen (Aleve) 7

## 2025-01-21 ENCOUNTER — HOSPITAL ENCOUNTER (OUTPATIENT)
Facility: HOSPITAL | Age: 50
Setting detail: OUTPATIENT SURGERY
Discharge: HOME | End: 2025-01-21
Attending: COLON & RECTAL SURGERY | Admitting: COLON & RECTAL SURGERY
Payer: OTHER GOVERNMENT

## 2025-01-21 ENCOUNTER — ANESTHESIA (OUTPATIENT)
Facility: HOSPITAL | Age: 50
End: 2025-01-21
Payer: OTHER GOVERNMENT

## 2025-01-21 VITALS
WEIGHT: 140 LBS | HEART RATE: 65 BPM | DIASTOLIC BLOOD PRESSURE: 74 MMHG | BODY MASS INDEX: 23.32 KG/M2 | SYSTOLIC BLOOD PRESSURE: 110 MMHG | HEIGHT: 65 IN | RESPIRATION RATE: 18 BRPM | OXYGEN SATURATION: 99 % | TEMPERATURE: 98.1 F

## 2025-01-21 DIAGNOSIS — G89.18 ACUTE POST-OPERATIVE PAIN: Primary | ICD-10-CM

## 2025-01-21 LAB — HCG UR QL: NEGATIVE

## 2025-01-21 PROCEDURE — 6370000000 HC RX 637 (ALT 250 FOR IP): Performed by: COLON & RECTAL SURGERY

## 2025-01-21 PROCEDURE — 3700000000 HC ANESTHESIA ATTENDED CARE: Performed by: COLON & RECTAL SURGERY

## 2025-01-21 PROCEDURE — 2500000003 HC RX 250 WO HCPCS: Performed by: COLON & RECTAL SURGERY

## 2025-01-21 PROCEDURE — 6370000000 HC RX 637 (ALT 250 FOR IP): Performed by: STUDENT IN AN ORGANIZED HEALTH CARE EDUCATION/TRAINING PROGRAM

## 2025-01-21 PROCEDURE — 3600000014 HC SURGERY LEVEL 4 ADDTL 15MIN: Performed by: COLON & RECTAL SURGERY

## 2025-01-21 PROCEDURE — 3600000004 HC SURGERY LEVEL 4 BASE: Performed by: COLON & RECTAL SURGERY

## 2025-01-21 PROCEDURE — 2709999900 HC NON-CHARGEABLE SUPPLY: Performed by: COLON & RECTAL SURGERY

## 2025-01-21 PROCEDURE — 7100000001 HC PACU RECOVERY - ADDTL 15 MIN: Performed by: COLON & RECTAL SURGERY

## 2025-01-21 PROCEDURE — 6360000002 HC RX W HCPCS: Performed by: STUDENT IN AN ORGANIZED HEALTH CARE EDUCATION/TRAINING PROGRAM

## 2025-01-21 PROCEDURE — 2580000003 HC RX 258: Performed by: STUDENT IN AN ORGANIZED HEALTH CARE EDUCATION/TRAINING PROGRAM

## 2025-01-21 PROCEDURE — 81025 URINE PREGNANCY TEST: CPT

## 2025-01-21 PROCEDURE — 3700000001 HC ADD 15 MINUTES (ANESTHESIA): Performed by: COLON & RECTAL SURGERY

## 2025-01-21 PROCEDURE — 88305 TISSUE EXAM BY PATHOLOGIST: CPT

## 2025-01-21 PROCEDURE — 2500000003 HC RX 250 WO HCPCS: Performed by: STUDENT IN AN ORGANIZED HEALTH CARE EDUCATION/TRAINING PROGRAM

## 2025-01-21 PROCEDURE — 7100000010 HC PHASE II RECOVERY - FIRST 15 MIN: Performed by: COLON & RECTAL SURGERY

## 2025-01-21 PROCEDURE — 7100000000 HC PACU RECOVERY - FIRST 15 MIN: Performed by: COLON & RECTAL SURGERY

## 2025-01-21 RX ORDER — LIDOCAINE HYDROCHLORIDE 20 MG/ML
INJECTION, SOLUTION EPIDURAL; INFILTRATION; INTRACAUDAL; PERINEURAL
Status: DISCONTINUED | OUTPATIENT
Start: 2025-01-21 | End: 2025-01-21 | Stop reason: SDUPTHER

## 2025-01-21 RX ORDER — LIDOCAINE HYDROCHLORIDE 10 MG/ML
1 INJECTION, SOLUTION EPIDURAL; INFILTRATION; INTRACAUDAL; PERINEURAL
Status: DISCONTINUED | OUTPATIENT
Start: 2025-01-21 | End: 2025-01-21 | Stop reason: HOSPADM

## 2025-01-21 RX ORDER — SODIUM CHLORIDE, SODIUM LACTATE, POTASSIUM CHLORIDE, CALCIUM CHLORIDE 600; 310; 30; 20 MG/100ML; MG/100ML; MG/100ML; MG/100ML
INJECTION, SOLUTION INTRAVENOUS
Status: DISCONTINUED | OUTPATIENT
Start: 2025-01-21 | End: 2025-01-21 | Stop reason: SDUPTHER

## 2025-01-21 RX ORDER — DEXAMETHASONE SODIUM PHOSPHATE 4 MG/ML
INJECTION, SOLUTION INTRA-ARTICULAR; INTRALESIONAL; INTRAMUSCULAR; INTRAVENOUS; SOFT TISSUE
Status: DISCONTINUED | OUTPATIENT
Start: 2025-01-21 | End: 2025-01-21 | Stop reason: SDUPTHER

## 2025-01-21 RX ORDER — MIDAZOLAM HYDROCHLORIDE 1 MG/ML
INJECTION, SOLUTION INTRAMUSCULAR; INTRAVENOUS
Status: DISCONTINUED | OUTPATIENT
Start: 2025-01-21 | End: 2025-01-21 | Stop reason: SDUPTHER

## 2025-01-21 RX ORDER — SCOPOLAMINE 1 MG/3D
PATCH, EXTENDED RELEASE TRANSDERMAL
Status: DISCONTINUED | OUTPATIENT
Start: 2025-01-21 | End: 2025-01-21 | Stop reason: SDUPTHER

## 2025-01-21 RX ORDER — BUPIVACAINE HYDROCHLORIDE AND EPINEPHRINE 2.5; 5 MG/ML; UG/ML
INJECTION, SOLUTION EPIDURAL; INFILTRATION; INTRACAUDAL; PERINEURAL PRN
Status: DISCONTINUED | OUTPATIENT
Start: 2025-01-21 | End: 2025-01-21 | Stop reason: HOSPADM

## 2025-01-21 RX ORDER — ONDANSETRON 2 MG/ML
4 INJECTION INTRAMUSCULAR; INTRAVENOUS
Status: COMPLETED | OUTPATIENT
Start: 2025-01-21 | End: 2025-01-21

## 2025-01-21 RX ORDER — PROCHLORPERAZINE EDISYLATE 5 MG/ML
5 INJECTION INTRAMUSCULAR; INTRAVENOUS
Status: DISCONTINUED | OUTPATIENT
Start: 2025-01-21 | End: 2025-01-21 | Stop reason: HOSPADM

## 2025-01-21 RX ORDER — SODIUM CHLORIDE, SODIUM LACTATE, POTASSIUM CHLORIDE, CALCIUM CHLORIDE 600; 310; 30; 20 MG/100ML; MG/100ML; MG/100ML; MG/100ML
INJECTION, SOLUTION INTRAVENOUS CONTINUOUS
Status: DISCONTINUED | OUTPATIENT
Start: 2025-01-21 | End: 2025-01-21 | Stop reason: HOSPADM

## 2025-01-21 RX ORDER — FENTANYL CITRATE 50 UG/ML
25 INJECTION, SOLUTION INTRAMUSCULAR; INTRAVENOUS EVERY 5 MIN PRN
Status: DISCONTINUED | OUTPATIENT
Start: 2025-01-21 | End: 2025-01-21 | Stop reason: HOSPADM

## 2025-01-21 RX ORDER — SODIUM CHLORIDE 0.9 % (FLUSH) 0.9 %
5-40 SYRINGE (ML) INJECTION EVERY 12 HOURS SCHEDULED
Status: DISCONTINUED | OUTPATIENT
Start: 2025-01-21 | End: 2025-01-21 | Stop reason: HOSPADM

## 2025-01-21 RX ORDER — SCOPOLAMINE 1 MG/3D
1 PATCH, EXTENDED RELEASE TRANSDERMAL
Status: DISCONTINUED | OUTPATIENT
Start: 2025-01-21 | End: 2025-01-21 | Stop reason: HOSPADM

## 2025-01-21 RX ORDER — SODIUM CHLORIDE 0.9 % (FLUSH) 0.9 %
5-40 SYRINGE (ML) INJECTION PRN
Status: DISCONTINUED | OUTPATIENT
Start: 2025-01-21 | End: 2025-01-21 | Stop reason: HOSPADM

## 2025-01-21 RX ORDER — HYDRALAZINE HYDROCHLORIDE 20 MG/ML
10 INJECTION INTRAMUSCULAR; INTRAVENOUS
Status: DISCONTINUED | OUTPATIENT
Start: 2025-01-21 | End: 2025-01-21 | Stop reason: HOSPADM

## 2025-01-21 RX ORDER — LABETALOL HYDROCHLORIDE 5 MG/ML
10 INJECTION, SOLUTION INTRAVENOUS
Status: DISCONTINUED | OUTPATIENT
Start: 2025-01-21 | End: 2025-01-21 | Stop reason: HOSPADM

## 2025-01-21 RX ORDER — HYDROMORPHONE HYDROCHLORIDE 2 MG/1
2 TABLET ORAL ONCE
Status: COMPLETED | OUTPATIENT
Start: 2025-01-21 | End: 2025-01-21

## 2025-01-21 RX ORDER — BUPIVACAINE HYDROCHLORIDE AND EPINEPHRINE 2.5; 5 MG/ML; UG/ML
20 INJECTION, SOLUTION EPIDURAL; INFILTRATION; INTRACAUDAL; PERINEURAL ONCE
Status: CANCELLED | OUTPATIENT
Start: 2025-01-21 | End: 2025-01-21

## 2025-01-21 RX ORDER — ROCURONIUM BROMIDE 10 MG/ML
INJECTION, SOLUTION INTRAVENOUS
Status: DISCONTINUED | OUTPATIENT
Start: 2025-01-21 | End: 2025-01-21 | Stop reason: SDUPTHER

## 2025-01-21 RX ORDER — ONDANSETRON 2 MG/ML
INJECTION INTRAMUSCULAR; INTRAVENOUS
Status: DISCONTINUED | OUTPATIENT
Start: 2025-01-21 | End: 2025-01-21 | Stop reason: SDUPTHER

## 2025-01-21 RX ORDER — FENTANYL CITRATE 50 UG/ML
INJECTION, SOLUTION INTRAMUSCULAR; INTRAVENOUS
Status: DISCONTINUED | OUTPATIENT
Start: 2025-01-21 | End: 2025-01-21 | Stop reason: SDUPTHER

## 2025-01-21 RX ORDER — HYDROMORPHONE HYDROCHLORIDE 1 MG/ML
0.5 INJECTION, SOLUTION INTRAMUSCULAR; INTRAVENOUS; SUBCUTANEOUS EVERY 5 MIN PRN
Status: DISCONTINUED | OUTPATIENT
Start: 2025-01-21 | End: 2025-01-21 | Stop reason: HOSPADM

## 2025-01-21 RX ORDER — SUCCINYLCHOLINE/SOD CL,ISO/PF 200MG/10ML
SYRINGE (ML) INTRAVENOUS
Status: DISCONTINUED | OUTPATIENT
Start: 2025-01-21 | End: 2025-01-21 | Stop reason: SDUPTHER

## 2025-01-21 RX ORDER — SODIUM CHLORIDE 9 MG/ML
INJECTION, SOLUTION INTRAVENOUS PRN
Status: DISCONTINUED | OUTPATIENT
Start: 2025-01-21 | End: 2025-01-21 | Stop reason: HOSPADM

## 2025-01-21 RX ORDER — HYDROMORPHONE HYDROCHLORIDE 2 MG/1
2-4 TABLET ORAL EVERY 4 HOURS PRN
Qty: 30 TABLET | Refills: 0 | Status: SHIPPED | OUTPATIENT
Start: 2025-01-21 | End: 2025-01-28

## 2025-01-21 RX ORDER — MIDAZOLAM HYDROCHLORIDE 2 MG/2ML
2 INJECTION, SOLUTION INTRAMUSCULAR; INTRAVENOUS PRN
Status: DISCONTINUED | OUTPATIENT
Start: 2025-01-21 | End: 2025-01-21 | Stop reason: HOSPADM

## 2025-01-21 RX ORDER — FENTANYL CITRATE 50 UG/ML
100 INJECTION, SOLUTION INTRAMUSCULAR; INTRAVENOUS
Status: DISCONTINUED | OUTPATIENT
Start: 2025-01-21 | End: 2025-01-21 | Stop reason: HOSPADM

## 2025-01-21 RX ORDER — NALOXONE HYDROCHLORIDE 0.4 MG/ML
INJECTION, SOLUTION INTRAMUSCULAR; INTRAVENOUS; SUBCUTANEOUS PRN
Status: DISCONTINUED | OUTPATIENT
Start: 2025-01-21 | End: 2025-01-21 | Stop reason: HOSPADM

## 2025-01-21 RX ADMIN — DEXAMETHASONE SODIUM PHOSPHATE 4 MG: 4 INJECTION, SOLUTION INTRAMUSCULAR; INTRAVENOUS at 15:08

## 2025-01-21 RX ADMIN — LIDOCAINE HYDROCHLORIDE 60 MG: 20 INJECTION, SOLUTION EPIDURAL; INFILTRATION; INTRACAUDAL; PERINEURAL at 14:45

## 2025-01-21 RX ADMIN — FENTANYL CITRATE 50 MCG: 50 INJECTION INTRAMUSCULAR; INTRAVENOUS at 14:45

## 2025-01-21 RX ADMIN — HYDROMORPHONE HYDROCHLORIDE 2 MG: 2 TABLET ORAL at 16:53

## 2025-01-21 RX ADMIN — SCOPALAMINE 1 PATCH: 1 PATCH, EXTENDED RELEASE TRANSDERMAL at 14:33

## 2025-01-21 RX ADMIN — MIDAZOLAM 2 MG: 1 INJECTION INTRAMUSCULAR; INTRAVENOUS at 14:35

## 2025-01-21 RX ADMIN — ONDANSETRON 4 MG: 2 INJECTION INTRAMUSCULAR; INTRAVENOUS at 16:29

## 2025-01-21 RX ADMIN — PROPOFOL 100 MCG/KG/MIN: 10 INJECTION, EMULSION INTRAVENOUS at 14:48

## 2025-01-21 RX ADMIN — PROPOFOL 150 MG: 10 INJECTION, EMULSION INTRAVENOUS at 14:45

## 2025-01-21 RX ADMIN — ONDANSETRON 4 MG: 2 INJECTION INTRAMUSCULAR; INTRAVENOUS at 15:08

## 2025-01-21 RX ADMIN — PROPOFOL 30 MG: 10 INJECTION, EMULSION INTRAVENOUS at 15:27

## 2025-01-21 RX ADMIN — SODIUM CHLORIDE, POTASSIUM CHLORIDE, SODIUM LACTATE AND CALCIUM CHLORIDE: 600; 310; 30; 20 INJECTION, SOLUTION INTRAVENOUS at 14:37

## 2025-01-21 RX ADMIN — Medication 140 MG: at 14:45

## 2025-01-21 RX ADMIN — ROCURONIUM BROMIDE 5 MG: 10 INJECTION, SOLUTION INTRAVENOUS at 14:45

## 2025-01-21 RX ADMIN — FENTANYL CITRATE 50 MCG: 50 INJECTION INTRAMUSCULAR; INTRAVENOUS at 15:41

## 2025-01-21 ASSESSMENT — PAIN - FUNCTIONAL ASSESSMENT: PAIN_FUNCTIONAL_ASSESSMENT: NONE - DENIES PAIN

## 2025-01-21 ASSESSMENT — PAIN SCALES - GENERAL: PAINLEVEL_OUTOF10: 0

## 2025-01-21 NOTE — BRIEF OP NOTE
Brief Postoperative Note      Patient: Savannah Pace  YOB: 1975  MRN: [id number]    Date of Procedure: 1/21/2025    Pre-Op Diagnosis Codes:  Rectal polyp [K62.1]  Post-Op Diagnosis:  Anal canal polyp  Procedure:  Excision of anal canal polyp  Surgeon:  Demarco Mustafa MD  Assistant:  MADELINE Whaley  Anesthesia: Monitor Anesthesia Care  Specimens:   ID Type Source Tests Collected by Time Destination   RIGHT LATERAL ANAL CANAL POLYP Tissue Tissue SURGICAL PATHOLOGY Demarco Mustafa MD 1/21/2025 1506    Drains:  None  Estimated Blood Loss:  10 mL  Crystalloid:  500 mL  Complications: None apparent.  Implants:  None  Findings:  Infection Present At Time Of Surgery (PATOS) (choose all levels that have infection present):  No infection present  Other Findings:  Benign-appearing 5 mm right lateral dentate line polyp.     This procedure was not performed to treat primary cutaneous melanoma through wide local excision      Electronically signed by Demarco Mustafa MD

## 2025-01-21 NOTE — DISCHARGE INSTRUCTIONS
Post-Operative Instructions      Diet:  Consume a high fiber diet as tolerated.  Such a diet may include fresh fruits, vegetables, and whole grain cereals and breads.  You should also drink 8-10 glasses of water, juice, or other non-alcoholic beverages per day.     Fiber Supplements:  Take 1 dose of Metamucil or other over-the-counter fiber supplement (Citrucel, BeneFiber, etc.) as directed each morning and another dose each evening.    Medications:  Take prescription pain medication (hydromorphone) as prescribed.  Do not drive, drink alcohol, or operate machinery while taking the hydromorphone.  Take docusate (non-prescription stool softener) twice per day as directed.  Beginning today, take maximum doses of Tylenol (1000 mg every 6 hours) until you do not need pain medication anymore.  Doing so will help you to manage the pain and to use less of the hydromorphone.  Beginning on the second day after surgery, you may take ibuprofen as directed in addition to or instead of the prescription medication if there has been no significant bleeding.  Do not take pain medication on an empty stomach.  Do not take aspirin or aspirin-containing products for 10 days following the procedure.    Activity:  Do not engage in any heavy lifting or other strenuous activity until you have been seen for follow-up.  You may walk as much as you want, and you may climb stairs.  Frequent walking will help prevent constipation.    Hygiene and Wound Care:  Remove the dressing on the first day after surgery or just before your next bowel movement (whichever comes first), then begin performing sitz baths (soaking in warm water) 3 times per day and after bowel movements.  The water should be very warm, and you should soak for no longer than 20 minutes at a time.  Do not wipe the anal area with dry toilet tissue; instead, use baby wipes.  After sitz baths, cover the anal area with a gauze dressing.  You may also apply Neosporin, Neosporin + Pain

## 2025-01-21 NOTE — H&P
Neomycin-Bacitracin Zn-Polymyx Hives    Neosporin [Bacitracin-Polymyxin B] Hives    Corn Oil Rash    Tomato Rash       Current Medications:  Cannot display prior to admission medications because the patient has not been admitted in this contact.       No current facility-administered medications for this encounter.     Current Outpatient Medications   Medication Sig    EPINEPHrine (EPIPEN) 0.3 MG/0.3ML SOAJ injection INJECT INTRAMUSCULARLY AS DIRECTED    levocetirizine (XYZAL) 5 MG tablet Take 1 tablet by mouth as needed    Saccharomyces boulardii (PROBIOTIC) 250 MG CAPS Take by mouth    fexofenadine (ALLEGRA) 180 MG tablet Take 1 tablet by mouth every morning    levonorgestrel-ethinyl estradiol (AVIANE;ALESSE;LESSINA) 0.1-20 MG-MCG per tablet Take 1 tablet by mouth daily    mesalamine (DELZICOL) 800 MG TBEC TBEC tablet Take 1 tablet by mouth every morning            Physical Exam:  Height 1.651 m (5' 5\"), weight 63.5 kg (140 lb), last menstrual period 12/30/2024.  GENERAL:  No apparent distress.  LUNGS:  Clear to auscultation bilaterally.  HEART:  Regular rate and rhythm with no murmurs, gallops, or rubs.  NEUROLOGIC: Alert and oriented.  No gross deficits.      Alerts:      Laboratory:    No results for input(s): \"HGB\", \"HCT\", \"WBC\", \"PLT\", \"INR\", \"BUN\", \"K\", \"CRCLT\" in the last 72 hours.    Invalid input(s): \"PTT\", \"PT\", \"CREA\"      Assessment and Plan:  The patient appears to have a new or recurrent distal rectal polyp for which repeat transanal excision is indicated.  She would prefer to do it under MAC.  The risks were reviewed, and the patient has agreed to proceed.

## 2025-01-21 NOTE — ANESTHESIA POSTPROCEDURE EVALUATION
Post-Anesthesia Evaluation and Assessment    Patient: Savannah Pace MRN: [id number]  SSN: [id number]    YOB: 1975  Age: 49 y.o.  Sex: female      I have evaluated the patient and they are stable and ready for discharge from the PACU.     Cardiovascular Function/Vital Signs  Visit Vitals  /74   Pulse 65   Temp 98.1 °F (36.7 °C) (Oral)   Resp 18   Ht 1.651 m (5' 5\")   Wt 63.5 kg (140 lb)   SpO2 99%   BMI 23.30 kg/m²       Patient is status post Monitor Anesthesia Care anesthesia for Procedure(s):  EXCISION OF ANAL CANAL POLYP.    Nausea/Vomiting: None    Postoperative hydration reviewed and adequate.    Pain:      Managed    Neurological Status:       At baseline    Mental Status, Level of Consciousness: Alert and  oriented to person, place, and time    Pulmonary Status:       Adequate oxygenation and airway patent    Complications related to anesthesia: None    Post-anesthesia assessment completed. No concerns    Signed By: Carlos Manuel Payne MD     January 21, 2025

## 2025-01-21 NOTE — PROGRESS NOTES
Discharge instructions reviewed with patient and family using teachback. All questions have been answered. Prescriptions sent to [facility]. Vital signs stable, pain appropriately managed. Patient wheeled off the unit with PACU staff.

## 2025-01-24 NOTE — OP NOTE
[address]  [address]                            OPERATIVE REPORT      PATIENT NAME: PATRICIO MCKENNA                 : 1975  MED REC NO: [id number]                       ROOM: OR  ACCOUNT NO: [id number]                       ADMIT DATE: 2025  PROVIDER: Demarco Mustafa MD    DATE OF SERVICE:  2025    PREOPERATIVE DIAGNOSES:  Rectal polyp.    POSTOPERATIVE DIAGNOSES:  Anal canal polyp.    PROCEDURES PERFORMED:  Excision of anal canal polyp.    SURGEON:  Demarco Mustafa MD    ASSISTANT:  MADELINE Whaley.    ANESTHESIA:  General endotracheal.    ESTIMATED BLOOD LOSS:  10 mL.    SPECIMENS REMOVED:  Right lateral anal canal polyp.    INTRAOPERATIVE FINDINGS:  There was a benign-appearing 5 mm polyp located in the right lateral position at the dentate line.     COMPLICATIONS:  None apparent.    IMPLANTS:  None.    INDICATIONS:  The patient is a 49-year-old female, who underwent the transanal excision of a rectal polyp on 2024.  The pathology report subsequently indicated that the specimen contained a tubular adenoma and that the margins were negative for dysplasia.  A colonoscopy was performed by Dr. Fraser on 11/15/2024, and it revealed evidence of recurrence at the excision site.  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  Examination in the office on 2024, revealed a 5 mm polypoid lesion at the dentate line.  Another transanal excision was indicated to treat this new or recurrent polyp.  The risks of the procedure were reviewed, and the patient agreed to proceed.    DESCRIPTION OF PROCEDURE:  After informed consent was obtained, the patient was taken to the operating room where standard monitoring devices were attached and adequate general endotracheal anesthesia was induced.  She was then placed in the prone pratik-knife position on the operating table with all pressure

## 2025-07-28 ENCOUNTER — TRANSCRIBE ORDERS (OUTPATIENT)
Facility: HOSPITAL | Age: 50
End: 2025-07-28

## 2025-07-28 DIAGNOSIS — Z91.89 PERSONAL HISTORY OF POISONING, PRESENTING HAZARDS TO HEALTH: Primary | ICD-10-CM

## 2025-08-13 ENCOUNTER — OFFICE VISIT (OUTPATIENT)
Age: 50
End: 2025-08-13

## 2025-08-13 VITALS
DIASTOLIC BLOOD PRESSURE: 72 MMHG | HEART RATE: 68 BPM | RESPIRATION RATE: 18 BRPM | SYSTOLIC BLOOD PRESSURE: 105 MMHG | TEMPERATURE: 98.1 F | BODY MASS INDEX: 24.62 KG/M2 | WEIGHT: 147.8 LBS | OXYGEN SATURATION: 98 % | HEIGHT: 65 IN

## 2025-08-13 DIAGNOSIS — B96.89 ACUTE BACTERIAL SINUSITIS: Primary | ICD-10-CM

## 2025-08-13 DIAGNOSIS — J01.90 ACUTE BACTERIAL SINUSITIS: Primary | ICD-10-CM

## 2025-08-13 RX ORDER — IPRATROPIUM BROMIDE 21 UG/1
2 SPRAY, METERED NASAL EVERY 12 HOURS
Qty: 30 ML | Refills: 0 | Status: SHIPPED | OUTPATIENT
Start: 2025-08-13

## 2025-08-13 ASSESSMENT — ENCOUNTER SYMPTOMS
GASTROINTESTINAL NEGATIVE: 1
COUGH: 1
EYES NEGATIVE: 1
ALLERGIC/IMMUNOLOGIC NEGATIVE: 1
SINUS PAIN: 1
SINUS PRESSURE: 1

## (undated) DEVICE — TOWEL,OR,DSP,ST,BLUE,STD,4/PK,20PK/CS: Brand: MEDLINE

## (undated) DEVICE — TUBING 1895522 5PK STRAIGHTSHOT TO XPS: Brand: STRAIGHTSHOT®

## (undated) DEVICE — MASTISOL ADHESIVE LIQ 2/3ML

## (undated) DEVICE — SET GRAV CK VLV NEEDLESS ST 3 GANGED 4WAY STPCOCK HI FLO 10

## (undated) DEVICE — SOLUTION IRRIG 1000ML 0.9% SOD CHL USP POUR PLAS BTL

## (undated) DEVICE — SNARE POLYP SM AD W13MMXL240CM SHTH DIA2.4MM HEX STIFF

## (undated) DEVICE — ENT-SMH: Brand: MEDLINE INDUSTRIES, INC.

## (undated) DEVICE — FORCEPS BX L240CM JAW DIA2.2MM RAD JAW 4 HOT DISP

## (undated) DEVICE — SUTURE PROL SZ 3-0 L18IN NONABSORBABLE BLU L19MM PS-2 3/8 8687H

## (undated) DEVICE — X-RAY DETECTABLE SPONGES,16 PLY: Brand: VISTEC

## (undated) DEVICE — HYPODERMIC SAFETY NEEDLE: Brand: MONOJECT

## (undated) DEVICE — PAD,EYE,1-5/8X2 5/8,STERILE,LF,1/PK: Brand: MEDLINE

## (undated) DEVICE — DEVICE INFL BLLN FOR DEFLATION OF CATH ACCLARENT

## (undated) DEVICE — INSTRUMENT TRACKER 9733533XOM ENT 1PK

## (undated) DEVICE — FCPS RAD JAW 4LC 240CM W/NDL -- BX/40

## (undated) DEVICE — AGENT LIFTING 10 CC SUBMUCOSAL INJ SOLUTION STRL BLUEBOOST

## (undated) DEVICE — RING RETRCT W14.1XL14.1CM PLAS SELF RET ADJ LTWT DISP LONE 3307G] COOPER SURGICAL]

## (undated) DEVICE — BASIN ST MAJOR-NO CAUTERY: Brand: MEDLINE INDUSTRIES, INC.

## (undated) DEVICE — PENCIL SMK EVAC 10 FT BLADE ELECTRD ROCKER FOR TELSCP

## (undated) DEVICE — ELECTRODE PT RET AD L9FT HI MOIST COND ADH HYDRGEL CORDED

## (undated) DEVICE — PATIENT TRACKER 9734887XOM NON-INVASIVE

## (undated) DEVICE — PACK,LAPAROTOMY,2 REINFORCED GOWNS: Brand: MEDLINE

## (undated) DEVICE — SPONGE GZ W4XL4IN COT 12 PLY TYP VII WVN C FLD DSGN STERILE

## (undated) DEVICE — FORCEP BX 1.8 MMX100 CM NDL RADIAL JAW DISP

## (undated) DEVICE — DRESSING,GAUZE,XEROFORM,CURAD,5"X9",ST: Brand: CURAD

## (undated) DEVICE — SNARE ENDOSCP M L240CM W27MM SHTH DIA2.4MM CHN 2.8MM OVL

## (undated) DEVICE — SYRINGE MED 10ML LUERLOCK TIP W/O SFTY DISP

## (undated) DEVICE — TOWEL OR BL STR 4/PK -- MEDICHOICE - MEDLINE

## (undated) DEVICE — FORCEPS BX L240CM JAW DIA2.4MM ORNG L CAP W/ NDL DISP RAD

## (undated) DEVICE — PAD,ABDOMINAL,5"X9",ST,LF,25/BX: Brand: MEDLINE INDUSTRIES, INC.

## (undated) DEVICE — GLOVE ORANGE PI 7   MSG9070

## (undated) DEVICE — SUTURE VICRYL + SZ 3-0 L27IN ABSRB UD L26MM SH 1/2 CIR VCP416H

## (undated) DEVICE — TUBING HYDR IRR --

## (undated) DEVICE — SKIN PREP TRAY 4 COMPARTM TRAY: Brand: MEDLINE INDUSTRIES, INC.

## (undated) DEVICE — Device

## (undated) DEVICE — KIT,ANTI FOG,W/SPONGE & FLUID,SOFT PACK: Brand: MEDLINE

## (undated) DEVICE — KIT APPL SPRY HEMSTAT PWDR -- F/HEMADERM FLEXTIP

## (undated) DEVICE — TUBING, SUCTION, 1/4" X 12', STRAIGHT: Brand: MEDLINE

## (undated) DEVICE — CODMAN® SURGICAL PATTIES 1/2" X 3" (1.27CM X 7.62CM): Brand: CODMAN®

## (undated) DEVICE — GARMENT,MEDLINE,DVT,INT,CALF,MED, GEN2: Brand: MEDLINE

## (undated) DEVICE — SUTURE VICRYL SZ 4-0 L27IN ABSRB UD L26MM SH 1/2 CIR J415H

## (undated) DEVICE — Device: Brand: SINGLE USE INJECTOR NM600/610

## (undated) DEVICE — TRAP SURG QUAD PARABOLA SLOT DSGN SFTY SCRN TRAPEASE

## (undated) DEVICE — AGENT CNTRST 10ML AMP INJ ELEVIEW 1291396] ARIES PHARMACEUTICALS INC]

## (undated) DEVICE — SYR 50ML LR LCK 1ML GRAD NSAF --

## (undated) DEVICE — SUPPLEMENT DIGESTIVE H2O SOL GI-EASE

## (undated) DEVICE — GLOVE SURG SZ 75 L1212IN FNGR THK138MIL BRN LTX FREE

## (undated) DEVICE — HOOK RETRCT L5MM E SHRP SELF RET SYS LONE STAR

## (undated) DEVICE — SYR 10ML CTRL LR LCK NSAF LF --

## (undated) DEVICE — BANDAGE COMPR XL KNEE ELBW TAN TUBIGRIP ARTHRO-PAD LTX

## (undated) DEVICE — SURGIFOAM SPNG SZ 12-7

## (undated) DEVICE — TAPE,CLOTH/SILK,CURAD,3"X10YD,LF,40/CS: Brand: CURAD

## (undated) DEVICE — SNARE ENDOSCP POLYP 2.4 MM 240 CM 10 MM 2.8 MM CAPTIVATOR

## (undated) DEVICE — POLYP TRAP: Brand: TRAPEASE®

## (undated) DEVICE — TRAP ENDOSCP CLR PLAS 4 CHMBR FIX DISP CATCHEM

## (undated) DEVICE — POSITIONER HD REST FOAM CMFRT TCH

## (undated) DEVICE — PREMIUM WET SKIN PREP TRAY: Brand: MEDLINE INDUSTRIES, INC.

## (undated) DEVICE — DRESSING 400402 20PK STD NASAL 8CM LONG: Brand: MEROCEL®

## (undated) DEVICE — Z DISCONTINUED USE 2220190 SUTURE VICRYL SZ 3-0 L27IN ABSRB UD L26MM SH 1/2 CIR J416H

## (undated) DEVICE — SOL INJ SOD CL 0.9% 500ML BG --